# Patient Record
Sex: FEMALE | Race: WHITE | NOT HISPANIC OR LATINO | Employment: UNEMPLOYED | URBAN - METROPOLITAN AREA
[De-identification: names, ages, dates, MRNs, and addresses within clinical notes are randomized per-mention and may not be internally consistent; named-entity substitution may affect disease eponyms.]

---

## 2020-12-15 ENCOUNTER — EVALUATION (OUTPATIENT)
Dept: OCCUPATIONAL THERAPY | Facility: CLINIC | Age: 50
End: 2020-12-15
Payer: COMMERCIAL

## 2020-12-15 DIAGNOSIS — S52.551D CLOSED EXTRAARTICULAR FRACTURE OF DISTAL END OF RIGHT RADIUS WITH ROUTINE HEALING: Primary | ICD-10-CM

## 2020-12-15 PROCEDURE — 97165 OT EVAL LOW COMPLEX 30 MIN: CPT

## 2020-12-15 PROCEDURE — 97110 THERAPEUTIC EXERCISES: CPT

## 2020-12-18 ENCOUNTER — OFFICE VISIT (OUTPATIENT)
Dept: OCCUPATIONAL THERAPY | Facility: CLINIC | Age: 50
End: 2020-12-18
Payer: COMMERCIAL

## 2020-12-18 DIAGNOSIS — S52.551D CLOSED EXTRAARTICULAR FRACTURE OF DISTAL END OF RIGHT RADIUS WITH ROUTINE HEALING: Primary | ICD-10-CM

## 2020-12-18 PROCEDURE — 97110 THERAPEUTIC EXERCISES: CPT

## 2020-12-18 PROCEDURE — 97140 MANUAL THERAPY 1/> REGIONS: CPT

## 2020-12-22 ENCOUNTER — OFFICE VISIT (OUTPATIENT)
Dept: OCCUPATIONAL THERAPY | Facility: CLINIC | Age: 50
End: 2020-12-22
Payer: COMMERCIAL

## 2020-12-22 DIAGNOSIS — S52.551D CLOSED EXTRAARTICULAR FRACTURE OF DISTAL END OF RIGHT RADIUS WITH ROUTINE HEALING: Primary | ICD-10-CM

## 2020-12-22 PROCEDURE — 97140 MANUAL THERAPY 1/> REGIONS: CPT

## 2020-12-22 PROCEDURE — 97110 THERAPEUTIC EXERCISES: CPT

## 2020-12-29 ENCOUNTER — APPOINTMENT (OUTPATIENT)
Dept: OCCUPATIONAL THERAPY | Facility: CLINIC | Age: 50
End: 2020-12-29
Payer: COMMERCIAL

## 2020-12-31 ENCOUNTER — OFFICE VISIT (OUTPATIENT)
Dept: OCCUPATIONAL THERAPY | Facility: CLINIC | Age: 50
End: 2020-12-31
Payer: COMMERCIAL

## 2020-12-31 DIAGNOSIS — S52.551D CLOSED EXTRAARTICULAR FRACTURE OF DISTAL END OF RIGHT RADIUS WITH ROUTINE HEALING: Primary | ICD-10-CM

## 2020-12-31 PROCEDURE — 97140 MANUAL THERAPY 1/> REGIONS: CPT

## 2020-12-31 PROCEDURE — 97110 THERAPEUTIC EXERCISES: CPT

## 2021-01-06 ENCOUNTER — OFFICE VISIT (OUTPATIENT)
Dept: OCCUPATIONAL THERAPY | Facility: CLINIC | Age: 51
End: 2021-01-06
Payer: COMMERCIAL

## 2021-01-06 DIAGNOSIS — S52.551D CLOSED EXTRAARTICULAR FRACTURE OF DISTAL END OF RIGHT RADIUS WITH ROUTINE HEALING: Primary | ICD-10-CM

## 2021-01-06 PROCEDURE — 97110 THERAPEUTIC EXERCISES: CPT

## 2021-01-06 PROCEDURE — 97140 MANUAL THERAPY 1/> REGIONS: CPT

## 2021-01-06 NOTE — PROGRESS NOTES
Daily Note     Today's date: 2021  Patient name: Aditya Caldwell  : 1970  MRN: [id number]  Referring provider: Farhat Segal  Dx:   Encounter Diagnosis     ICD-10-CM    1  Closed extraarticular fracture of distal end of right radius with routine healing  S52 551D                   Subjective: Patient noting pain 1/10  Objective: See treatment diary below          Precautions:  ORIF 2020      Manuals 12/15/2020  2020  2020 1/6/21    Grade 2-3 mobs wrist and forearm  x10  x10 x10    STR  X 5 min  x5min x5                    Ther Ex        AROM tendon glides x15 x15 x20 x20    AROM wrist flexion/ext/RD/UD x15 x15 x20 x20    AROM forearm rotation x15 x15 x20 x20    AROM table top extension x15 x15 x20 x20    Towel scrunch and squeeze  x10 x10 x10    Wrist maze    x10 x10 x10    PREs wrist flex/extension/rd    x20 2 lbs    Sponge     x20 blue    Digit extension band    x20 red                            Ther Activity                        Modalities        MHP 5 min   5" 5 min                Assessment:   Patient tolerated session well  Patient session focused on using HEP, modalities, and manual techniques to improve ability to complete ADLs with ease  Added strengthening this date with no increased pain noted  Patient progressing well towards goals at this time  Patient benefiting from skilled OT to reduce dependence with ADLs  Plan:   Focus on AROM to improve ability to complete ADLs with ease

## 2021-01-07 ENCOUNTER — OFFICE VISIT (OUTPATIENT)
Dept: OCCUPATIONAL THERAPY | Facility: CLINIC | Age: 51
End: 2021-01-07
Payer: COMMERCIAL

## 2021-01-07 DIAGNOSIS — S52.551D CLOSED EXTRAARTICULAR FRACTURE OF DISTAL END OF RIGHT RADIUS WITH ROUTINE HEALING: Primary | ICD-10-CM

## 2021-01-07 PROCEDURE — 97110 THERAPEUTIC EXERCISES: CPT

## 2021-01-07 NOTE — PROGRESS NOTES
Daily Note     Today's date: 2021  Patient name: Vic Moreno  : 1970  MRN: [id number]  Referring provider: Sia Peace  Dx:   Encounter Diagnosis     ICD-10-CM    1  Closed extraarticular fracture of distal end of right radius with routine healing  S52 551D                   Subjective: Patient noting pain 1/10  Objective: See treatment diary below          Precautions:  ORIF 2020      Manuals 12/15/2020  2020  2020 1/6/21 1/7/21   Grade 2-3 mobs wrist and forearm  x10  x10 x10 x10   STR  X 5 min  x5min x5 x5 min                   Ther Ex        AROM tendon glides x15 x15 x20 x20    AROM wrist flexion/ext/RD/UD x15 x15 x20 x20 x20   AROM forearm rotation x15 x15 x20 x20 x20   AROM table top extension x15 x15 x20 x20 x20   Towel scrunch and squeeze  x10 x10 x10 x10   Wrist maze    x10 x10 x10 x10   PREs wrist flex/extension/rd    x20 2 lbs x20 2 lbs   gripper    x20 blue Level 2 yellow x20   Digit extension band    x20 red x20 red   putty     Pinch                     Ther Activity                        Modalities        MHP 5 min   5" 5 min                Assessment:   Patient tolerated session well  Patient session focused on using HEP, modalities, and manual techniques to improve ability to complete ADLs with ease  Added strengthening this date with no increased pain noted  Patient progressing well towards goals at this time  Patient benefiting from skilled OT to reduce dependence with ADLs  Plan:   Focus on AROM to improve ability to complete ADLs with ease

## 2021-01-08 ENCOUNTER — APPOINTMENT (OUTPATIENT)
Dept: OCCUPATIONAL THERAPY | Facility: CLINIC | Age: 51
End: 2021-01-08
Payer: COMMERCIAL

## 2021-01-12 ENCOUNTER — EVALUATION (OUTPATIENT)
Dept: PHYSICAL THERAPY | Facility: CLINIC | Age: 51
End: 2021-01-12
Payer: COMMERCIAL

## 2021-01-12 ENCOUNTER — OFFICE VISIT (OUTPATIENT)
Dept: OCCUPATIONAL THERAPY | Facility: CLINIC | Age: 51
End: 2021-01-12
Payer: COMMERCIAL

## 2021-01-12 DIAGNOSIS — M76.821 TIBIALIS POSTERIOR TENDONITIS, RIGHT: Primary | ICD-10-CM

## 2021-01-12 DIAGNOSIS — S52.552D CLOSED EXTRAARTICULAR FRACTURE OF DISTAL END OF LEFT RADIUS WITH ROUTINE HEALING: Primary | ICD-10-CM

## 2021-01-12 DIAGNOSIS — M79.671 RIGHT FOOT PAIN: ICD-10-CM

## 2021-01-12 PROBLEM — G35 MULTIPLE SCLEROSIS (HCC): Status: ACTIVE | Noted: 2021-01-12

## 2021-01-12 PROCEDURE — 97161 PT EVAL LOW COMPLEX 20 MIN: CPT

## 2021-01-12 PROCEDURE — 97110 THERAPEUTIC EXERCISES: CPT

## 2021-01-12 PROCEDURE — 97140 MANUAL THERAPY 1/> REGIONS: CPT

## 2021-01-12 NOTE — LETTER
2021    [address]     Patient: Gerilyn Bloch   YOB: 1970   Date of Visit: 2021     Encounter Diagnosis     ICD-10-CM    1  Tibialis posterior tendonitis, right  M76 821    2  Right foot pain  M79 671        Dear Dr Vincent Fay: Thank you for your recent referral of Gerilyn Bloch  Please review the attached evaluation summary from Roxi's recent visit  Please verify that you agree with the plan of care by signing the attached order  If you have any questions or concerns, please do not hesitate to call  I sincerely appreciate the opportunity to share in the care of one of your patients and hope to have another opportunity to work with you in the near future  Sincerely,    Inga Juan, PT      Referring Provider:      I certify that I have read the below Plan of Care and certify the need for these services furnished under this plan of treatment while under my care  Uche Patel  [address] [place] [address]  Via Fax: [phone]          PT Evaluation     Today's date: 2021  Patient name: Gerilyn Bloch  : 1970  MRN: [id number]  Referring provider: No ref  provider found  Dx:   Encounter Diagnosis     ICD-10-CM    1  Tibialis posterior tendonitis, right  M76 821    2  Right foot pain  M79 671        Start Time: 1400  Stop Time: 1445  Total time in clinic (min): 45 minutes    Assessment  Assessment details: Gerilyn Bloch is a 48y o  year old female reports to physical therapy with symptoms consistent with referring diagnosis of: Tibialis posterior tendonitis, right  (primary encounter diagnosis  Patient demonstrates limitations in R ankle and foot ROM, strength, and functional mobility  Patient is limited in the following functional activities: standing and walking greater than 15 minutes, ascending/descending stairs, and sleeping through the night due to pain       FOTO score is 32% with a 52% prediction in function  Patient requires skilled physical therapy to restore prior level of function, address functional limitations, and progress towards independence with home exercise program  Patient was educated on HEP as noted on flow sheet, nature of PTTD, and importance of performing ice throughout the course of the day to eliminate swelling  Patient verbalized and demonstrated understanding of HEP and plan of care  Symptom irritability: low  Goals  STGs to be achieved in 4 weeks  -STG 1: Patient will be independent with HEP    -STG 2: Patient will have 0/10 R foot pain at rest    -STG 3: Patient will increase R great toe ROM to within normal limits  -STG 4: Patient will report 40% functional improvement  -STG 5: Patient will demonstrate 1/2 grade MMT improvement in R ankle  -STG 6: Patient will be able to stand for greater than 15 minutes with no pain  LTGs to be achieved in 8 weeks  -LTG 1: Patient will demonstrate independence with maintenance program    -LTG 2: Patient will perform all functional activities with less than 2/10 R ankle/foot pain  -LTG 3: Patient will improve FOTO score by 10 points  -LTG 4: Patient will report 80% functional improvement  -LTG 5: Patient will be able to stand for greater than 15 minutes with no pain  -LTG 6: Patient will demonstrate 1 grade MMT improvement in R ankle         Plan  Patient would benefit from: PT eval and skilled physical therapy  Planned modality interventions: manual electrical stimulation, microcurrent electrical stimulation, TENS, thermotherapy: hydrocollator packs, cryotherapy, electrical stimulation/Russian stimulation, traction and ultrasound  Planned therapy interventions: joint mobilization, manual therapy, massage, balance, neuromuscular re-education, orthotic management and training, patient education, strengthening, stretching, therapeutic activities, therapeutic exercise, therapeutic training, flexibility, functional ROM exercises, gait training, graded activity, graded exercise, graded motor and home exercise program  Frequency: 3x week  Duration in weeks: 8  Treatment plan discussed with: patient        Subjective Evaluation    History of Present Illness  Mechanism of injury: Patient reports having increased R ankle and foot pain for the last several months  She fell into a marsh when walking around the park with her daughter and grandchildren  She reports to skilled PT intervention with R PTTD  She denies numbness or tingling on R feet  Patient reports increased swelling throughout the course of her day  Functionally, she reports difficulties with ambulation, ascending/descending stairs, and standing  She was prescribed a brace at the podiatrist, which helped dissipate her pain  She now has a brace that laces to help stabilize her R ankle  At times, she is using an Ace bandage due to difficulties donning her brace  She had an x-ray performed on her R ankle, with clear results  Patient reports having arch supports for her sneakers  She was last prescribed 20 years ago, and has not received a pair since  Patient has a history of MS  Pain  Current pain ratin  At best pain ratin  At worst pain ratin  Relieving factors: medications, rest and ice  Aggravating factors: standing, walking and stair climbing  Progression: no change    Social Support  Steps to enter house: yes  12  Stairs in house: yes   4  Lives in: multiple-level home  Lives with: significant other    Employment status: not working    Diagnostic Tests  X-ray: normal  Treatments  Current treatment: medication  Patient Goals  Patient goals for therapy: decreased pain          Objective     Static Posture     Ankle/Foot   Ankle/Foot (Left): Pes planus  Ankle/Foot (Right): Pes planus       Neurological Testing     Sensation     Ankle/Foot   Left Ankle/Foot   Intact: light touch    Right Ankle/Foot   Intact: light touch     Active Range of Motion Left Ankle/Foot   Normal active range of motion    Right Ankle/Foot   Dorsiflexion (ke): 5 degrees   Dorsiflexion (kf): 5 degrees   Plantar flexion: WFL  Inversion: WFL  Eversion: WFL  Great toe extension: 10 degrees     Passive Range of Motion   Left Ankle/Foot  Normal passive range of motion    Right Ankle/Foot    Dorsiflexion (ke): 5 degrees   Dorsiflexion (kf): 5 degrees    Plantar flexion: WFL  Inversion: WFL  Eversion: WFL  Great toe extension: 10 degrees     Strength/Myotome Testing     Left Ankle/Foot   Normal strength    Right Ankle/Foot   Dorsiflexion: 4  Plantar flexion: 4  Inversion: 3+  Eversion: 3+  Great toe flexion: 5  Great toe extension: 4    Tests     Additional Tests Details  No special tests performed, as ortho note reviewed prior to initial evaluation  Ambulation     Quality of Movement During Gait     Ankle    Ankle (Left): Positive pronated  Ankle (Right): Positive pronated  Foot Alignment    Foot Alignment (Left): Positive flat foot  Foot Alignment (Right): Positive flat foot         Flowsheet Rows      Most Recent Value   PT/OT G-Codes   Current Score  32   Projected Score  52             Precautions: Standard, multiple sclerosis, L distal radius fracture, R PTTD      Manuals 1/12            STM R plantar fascia              PRE R ankle                                       Neuro Re-Ed             Towel scrunch x20            Intrinsic strength x20            Balance                                                                 Ther Ex             NuStep warmup             Gastroc stretch 3x10s            Ankle 4 way x10 YTB            SLR flex, abd             LAQ             Wobble board                                       Ther Activity                                       Gait Training                                       Modalities             Ice post

## 2021-01-12 NOTE — PROGRESS NOTES
Daily Note     Today's date: 2021  Patient name: Shanae Galdamez  : 1970  MRN: [id number]  Referring provider: Sekou Valentine  Dx:   Encounter Diagnosis     ICD-10-CM    1  Closed extraarticular fracture of distal end of left radius with routine healing  S52 552D                   Subjective: Patient noting pain 1/10  Objective: See treatment diary below          Precautions:  ORIF 2020      Manuals 12/15/2020  2020  2020 1/6/21 1/7/21   Grade 2-3 mobs wrist and forearm  x10  x10 x10 x10   STR  X 5 min  x5min x5 x5 min                   Ther Ex        AROM tendon glides x15 x15 x20 x20    AROM wrist flexion/ext/RD/UD x15 x15 x20 x20 x20   AROM forearm rotation x15 x15 x20 x20 x20   AROM table top extension x15 x15 x20 x20 x20   Towel scrunch and squeeze  x10 x10 x10 x10   Wrist maze    x10 x10 x10 x10   PREs wrist flex/extension/rd   x20 2 lbs x20 2 lbs x20 2 lbs   gripper   x20 level 3 yellow x20 blue Level 2 yellow x20   Digit extension band   x20 red x20 red x20 red   Flex bar all planes   x20 red   Pinch                     Ther Activity                        Modalities        MHP 5 min   5" 5 min                Assessment:   Patient tolerated session well  Patient session focused on using HEP, modalities, manual techniques, and TE to improve ability to complete ADLs with ease  Added strengthening this date with no increased pain noted  Patient progressing well towards goals at this time  Patient benefiting from skilled OT to reduce dependence with ADLs  Plan:   Focus on AROM to improve ability to complete ADLs with ease

## 2021-01-12 NOTE — PROGRESS NOTES
PT Evaluation     Today's date: 2021  Patient name: Isis Manzo  : 1970  MRN: [id number]  Referring provider: Albino Lockett  Dx:   Encounter Diagnosis     ICD-10-CM    1  Tibialis posterior tendonitis, right  M76 821    2  Right foot pain  M79 671        Start Time: 1400  Stop Time: 1445  Total time in clinic (min): 45 minutes    Assessment  Assessment details: Isis Manzo is a 48y o  year old female reports to physical therapy with symptoms consistent with referring diagnosis of: Tibialis posterior tendonitis, right  (primary encounter diagnosis  Patient demonstrates limitations in R ankle and foot ROM, strength, and functional mobility  Patient is limited in the following functional activities: standing and walking greater than 15 minutes, ascending/descending stairs, and sleeping through the night due to pain  FOTO score is 32% with a 52% prediction in function  Patient requires skilled physical therapy to restore prior level of function, address functional limitations, and progress towards independence with home exercise program  Patient was educated on HEP as noted on flow sheet, nature of PTTD, and importance of performing ice throughout the course of the day to eliminate swelling  Patient verbalized and demonstrated understanding of HEP and plan of care  Symptom irritability: low  Goals  STGs to be achieved in 4 weeks  -STG 1: Patient will be independent with HEP    -STG 2: Patient will have 0/10 R foot pain at rest    -STG 3: Patient will increase R great toe ROM to within normal limits  -STG 4: Patient will report 40% functional improvement  -STG 5: Patient will demonstrate 1/2 grade MMT improvement in R ankle  -STG 6: Patient will be able to stand for greater than 15 minutes with no pain  LTGs to be achieved in 8 weeks     -LTG 1: Patient will demonstrate independence with maintenance program    -LTG 2: Patient will perform all functional activities with less than 2/10 R ankle/foot pain  -LTG 3: Patient will improve FOTO score by 10 points  -LTG 4: Patient will report 80% functional improvement  -LTG 5: Patient will be able to stand for greater than 15 minutes with no pain  -LTG 6: Patient will demonstrate 1 grade MMT improvement in R ankle  Plan  Patient would benefit from: PT eval and skilled physical therapy  Planned modality interventions: manual electrical stimulation, microcurrent electrical stimulation, TENS, thermotherapy: hydrocollator packs, cryotherapy, electrical stimulation/Russian stimulation, traction and ultrasound  Planned therapy interventions: joint mobilization, manual therapy, massage, balance, neuromuscular re-education, orthotic management and training, patient education, strengthening, stretching, therapeutic activities, therapeutic exercise, therapeutic training, flexibility, functional ROM exercises, gait training, graded activity, graded exercise, graded motor and home exercise program  Frequency: 3x week  Duration in weeks: 8  Treatment plan discussed with: patient        Subjective Evaluation    History of Present Illness  Mechanism of injury: Patient reports having increased R ankle and foot pain for the last several months  She fell into a marsh when walking around the park with her daughter and grandchildren  She reports to skilled PT intervention with R PTTD  She denies numbness or tingling on R feet  Patient reports increased swelling throughout the course of her day  Functionally, she reports difficulties with ambulation, ascending/descending stairs, and standing  She was prescribed a brace at the podiatrist, which helped dissipate her pain  She now has a brace that laces to help stabilize her R ankle  At times, she is using an Ace bandage due to difficulties donning her brace  She had an x-ray performed on her R ankle, with clear results  Patient reports having arch supports for her sneakers   She was last prescribed 20 years ago, and has not received a pair since  Patient has a history of MS  Pain  Current pain ratin  At best pain ratin  At worst pain ratin  Relieving factors: medications, rest and ice  Aggravating factors: standing, walking and stair climbing  Progression: no change    Social Support  Steps to enter house: yes  12  Stairs in house: yes   4  Lives in: multiple-level home  Lives with: significant other    Employment status: not working    Diagnostic Tests  X-ray: normal  Treatments  Current treatment: medication  Patient Goals  Patient goals for therapy: decreased pain          Objective     Static Posture     Ankle/Foot   Ankle/Foot (Left): Pes planus  Ankle/Foot (Right): Pes planus  Neurological Testing     Sensation     Ankle/Foot   Left Ankle/Foot   Intact: light touch    Right Ankle/Foot   Intact: light touch     Active Range of Motion   Left Ankle/Foot   Normal active range of motion    Right Ankle/Foot   Dorsiflexion (ke): 5 degrees   Dorsiflexion (kf): 5 degrees   Plantar flexion: WFL  Inversion: WFL  Eversion: WFL  Great toe extension: 10 degrees     Passive Range of Motion   Left Ankle/Foot  Normal passive range of motion    Right Ankle/Foot    Dorsiflexion (ke): 5 degrees   Dorsiflexion (kf): 5 degrees    Plantar flexion: WFL  Inversion: WFL  Eversion: WFL  Great toe extension: 10 degrees     Strength/Myotome Testing     Left Ankle/Foot   Normal strength    Right Ankle/Foot   Dorsiflexion: 4  Plantar flexion: 4  Inversion: 3+  Eversion: 3+  Great toe flexion: 5  Great toe extension: 4    Tests     Additional Tests Details  No special tests performed, as ortho note reviewed prior to initial evaluation  Ambulation     Quality of Movement During Gait     Ankle    Ankle (Left): Positive pronated  Ankle (Right): Positive pronated  Foot Alignment    Foot Alignment (Left): Positive flat foot  Foot Alignment (Right): Positive flat foot         Flowsheet Rows      Most Recent Value PT/OT G-Codes   Current Score  32   Projected Score  52             Precautions: Standard, multiple sclerosis, L distal radius fracture, R PTTD      Manuals 1/12            STM R plantar fascia              PRE R ankle                                       Neuro Re-Ed             Towel scrunch x20            Intrinsic strength x20            Balance                                                                 Ther Ex             NuStep warmup             Gastroc stretch 3x10s            Ankle 4 way x10 YTB            SLR flex, abd             LAQ             Wobble board                                       Ther Activity                                       Gait Training                                       Modalities             Ice post

## 2021-01-12 NOTE — LETTER
2021    No Recipients    Patient: Tran Mack   YOB: 1970   Date of Visit: 2021     Encounter Diagnosis     ICD-10-CM    1  Tibialis posterior tendonitis, right  M76 821    2  Right foot pain  M79 671        Dear Dr Etta Eagle Recipients: Thank you for your recent referral of Tran Mack  Please review the attached evaluation summary from Roxi's recent visit  Please verify that you agree with the plan of care by signing the attached order  If you have any questions or concerns, please do not hesitate to call  I sincerely appreciate the opportunity to share in the care of one of your patients and hope to have another opportunity to work with you in the near future  Sincerely,    Matilda Griffin, PT      Referring Provider:      I certify that I have read the below Plan of Care and certify the need for these services furnished under this plan of treatment while under my care  No Recipients          PT Evaluation     Today's date: 2021  Patient name: Tran Mack  : 1970  MRN: [id number]  Referring provider: No ref  provider found  Dx:   Encounter Diagnosis     ICD-10-CM    1  Tibialis posterior tendonitis, right  M76 821    2  Right foot pain  M79 671        Start Time: 1400  Stop Time: 1445  Total time in clinic (min): 45 minutes    Assessment  Assessment details: Tran Mack is a 48y o  year old female reports to physical therapy with symptoms consistent with referring diagnosis of: Tibialis posterior tendonitis, right  (primary encounter diagnosis  Patient demonstrates limitations in R ankle and foot ROM, strength, and functional mobility  Patient is limited in the following functional activities: standing and walking greater than 15 minutes, ascending/descending stairs, and sleeping through the night due to pain  FOTO score is 32% with a 52% prediction in function        Patient requires skilled physical therapy to restore prior level of function, address functional limitations, and progress towards independence with home exercise program  Patient was educated on HEP as noted on flow sheet, nature of PTTD, and importance of performing ice throughout the course of the day to eliminate swelling  Patient verbalized and demonstrated understanding of HEP and plan of care  Symptom irritability: low  Goals  STGs to be achieved in 4 weeks  -STG 1: Patient will be independent with HEP    -STG 2: Patient will have 0/10 R foot pain at rest    -STG 3: Patient will increase R great toe ROM to within normal limits  -STG 4: Patient will report 40% functional improvement  -STG 5: Patient will demonstrate 1/2 grade MMT improvement in R ankle  -STG 6: Patient will be able to stand for greater than 15 minutes with no pain  LTGs to be achieved in 8 weeks  -LTG 1: Patient will demonstrate independence with maintenance program    -LTG 2: Patient will perform all functional activities with less than 2/10 R ankle/foot pain  -LTG 3: Patient will improve FOTO score by 10 points  -LTG 4: Patient will report 80% functional improvement  -LTG 5: Patient will be able to stand for greater than 15 minutes with no pain  -LTG 6: Patient will demonstrate 1 grade MMT improvement in R ankle         Plan  Patient would benefit from: PT eval and skilled physical therapy  Planned modality interventions: manual electrical stimulation, microcurrent electrical stimulation, TENS, thermotherapy: hydrocollator packs, cryotherapy, electrical stimulation/Russian stimulation, traction and ultrasound  Planned therapy interventions: joint mobilization, manual therapy, massage, balance, neuromuscular re-education, orthotic management and training, patient education, strengthening, stretching, therapeutic activities, therapeutic exercise, therapeutic training, flexibility, functional ROM exercises, gait training, graded activity, graded exercise, graded motor and home exercise program  Frequency: 3x week  Duration in weeks: 8  Treatment plan discussed with: patient        Subjective Evaluation    History of Present Illness  Mechanism of injury: Patient reports having increased R ankle and foot pain for the last several months  She fell into a marsh when walking around the park with her daughter and grandchildren  She reports to skilled PT intervention with R PTTD  She denies numbness or tingling on R feet  Patient reports increased swelling throughout the course of her day  Functionally, she reports difficulties with ambulation, ascending/descending stairs, and standing  She was prescribed a brace at the podiatrist, which helped dissipate her pain  She now has a brace that laces to help stabilize her R ankle  At times, she is using an Ace bandage due to difficulties donning her brace  She had an x-ray performed on her R ankle, with clear results  Patient reports having arch supports for her sneakers  She was last prescribed 20 years ago, and has not received a pair since  Patient has a history of MS  Pain  Current pain ratin  At best pain ratin  At worst pain ratin  Relieving factors: medications, rest and ice  Aggravating factors: standing, walking and stair climbing  Progression: no change    Social Support  Steps to enter house: yes  12  Stairs in house: yes   4  Lives in: multiple-level home  Lives with: significant other    Employment status: not working    Diagnostic Tests  X-ray: normal  Treatments  Current treatment: medication  Patient Goals  Patient goals for therapy: decreased pain          Objective     Static Posture     Ankle/Foot   Ankle/Foot (Left): Pes planus  Ankle/Foot (Right): Pes planus       Neurological Testing     Sensation     Ankle/Foot   Left Ankle/Foot   Intact: light touch    Right Ankle/Foot   Intact: light touch     Active Range of Motion   Left Ankle/Foot   Normal active range of motion    Right Ankle/Foot Dorsiflexion (ke): 5 degrees   Dorsiflexion (kf): 5 degrees   Plantar flexion: WFL  Inversion: WFL  Eversion: WFL  Great toe extension: 10 degrees     Passive Range of Motion   Left Ankle/Foot  Normal passive range of motion    Right Ankle/Foot    Dorsiflexion (ke): 5 degrees   Dorsiflexion (kf): 5 degrees    Plantar flexion: WFL  Inversion: WFL  Eversion: WFL  Great toe extension: 10 degrees     Strength/Myotome Testing     Left Ankle/Foot   Normal strength    Right Ankle/Foot   Dorsiflexion: 4  Plantar flexion: 4  Inversion: 3+  Eversion: 3+  Great toe flexion: 5  Great toe extension: 4    Tests     Additional Tests Details  No special tests performed, as ortho note reviewed prior to initial evaluation  Ambulation     Quality of Movement During Gait     Ankle    Ankle (Left): Positive pronated  Ankle (Right): Positive pronated  Foot Alignment    Foot Alignment (Left): Positive flat foot  Foot Alignment (Right): Positive flat foot         Flowsheet Rows      Most Recent Value   PT/OT G-Codes   Current Score  32   Projected Score  52             Precautions: Standard, multiple sclerosis, L distal radius fracture, R PTTD      Manuals 1/12            STM R plantar fascia              PRE R ankle                                       Neuro Re-Ed             Towel scrunch x20            Intrinsic strength x20            Balance                                                                 Ther Ex             NuStep warmup             Gastroc stretch 3x10s            Ankle 4 way x10 YTB            SLR flex, abd             LAQ             Wobble board                                       Ther Activity                                       Gait Training                                       Modalities             Ice post

## 2021-01-12 NOTE — LETTER
February 3, 2021    [address]     Patient: Sherine Saez   YOB: 1970   Date of Visit: 2021     Encounter Diagnosis     ICD-10-CM    1  Tibialis posterior tendonitis, right  M76 821    2  Right foot pain  M79 671        Dear Dr Rozena Mohs: Thank you for your recent referral of Sherine Saez  Please review the attached evaluation summary from Roxi's recent visit  Please verify that you agree with the plan of care by signing the attached order  If you have any questions or concerns, please do not hesitate to call  I sincerely appreciate the opportunity to share in the care of one of your patients and hope to have another opportunity to work with you in the near future  Sincerely,    Julianna Necessary, PT      Referring Provider:      I certify that I have read the below Plan of Care and certify the need for these services furnished under this plan of treatment while under my care  Uche Patel  [address] [address]  Via Fax: [phone]          PT Evaluation     Today's date: 2021  Patient name: Sherine Saez  : 1970  MRN: [id number]  Referring provider: Meagan Pritchard  Dx:   Encounter Diagnosis     ICD-10-CM    1  Tibialis posterior tendonitis, right  M76 821    2  Right foot pain  M79 671        Start Time: 1400  Stop Time: 1445  Total time in clinic (min): 45 minutes    Assessment  Assessment details: Sherine Saez is a 48y o  year old female reports to physical therapy with symptoms consistent with referring diagnosis of: Tibialis posterior tendonitis, right  (primary encounter diagnosis  Patient demonstrates limitations in R ankle and foot ROM, strength, and functional mobility  Patient is limited in the following functional activities: standing and walking greater than 15 minutes, ascending/descending stairs, and sleeping through the night due to pain       FOTO score is 32% with a 52% prediction in function  Patient requires skilled physical therapy to restore prior level of function, address functional limitations, and progress towards independence with home exercise program  Patient was educated on HEP as noted on flow sheet, nature of PTTD, and importance of performing ice throughout the course of the day to eliminate swelling  Patient verbalized and demonstrated understanding of HEP and plan of care  Symptom irritability: low  Goals  STGs to be achieved in 4 weeks  -STG 1: Patient will be independent with HEP    -STG 2: Patient will have 0/10 R foot pain at rest    -STG 3: Patient will increase R great toe ROM to within normal limits  -STG 4: Patient will report 40% functional improvement  -STG 5: Patient will demonstrate 1/2 grade MMT improvement in R ankle  -STG 6: Patient will be able to stand for greater than 15 minutes with no pain  LTGs to be achieved in 8 weeks  -LTG 1: Patient will demonstrate independence with maintenance program    -LTG 2: Patient will perform all functional activities with less than 2/10 R ankle/foot pain  -LTG 3: Patient will improve FOTO score by 10 points  -LTG 4: Patient will report 80% functional improvement  -LTG 5: Patient will be able to stand for greater than 15 minutes with no pain  -LTG 6: Patient will demonstrate 1 grade MMT improvement in R ankle         Plan  Patient would benefit from: PT eval and skilled physical therapy  Planned modality interventions: manual electrical stimulation, microcurrent electrical stimulation, TENS, thermotherapy: hydrocollator packs, cryotherapy, electrical stimulation/Russian stimulation, traction and ultrasound  Planned therapy interventions: joint mobilization, manual therapy, massage, balance, neuromuscular re-education, orthotic management and training, patient education, strengthening, stretching, therapeutic activities, therapeutic exercise, therapeutic training, flexibility, functional ROM exercises, gait training, graded activity, graded exercise, graded motor and home exercise program  Frequency: 3x week  Duration in weeks: 8  Treatment plan discussed with: patient        Subjective Evaluation    History of Present Illness  Mechanism of injury: Patient reports having increased R ankle and foot pain for the last several months  She fell into a marsh when walking around the park with her daughter and grandchildren  She reports to skilled PT intervention with R PTTD  She denies numbness or tingling on R feet  Patient reports increased swelling throughout the course of her day  Functionally, she reports difficulties with ambulation, ascending/descending stairs, and standing  She was prescribed a brace at the podiatrist, which helped dissipate her pain  She now has a brace that laces to help stabilize her R ankle  At times, she is using an Ace bandage due to difficulties donning her brace  She had an x-ray performed on her R ankle, with clear results  Patient reports having arch supports for her sneakers  She was last prescribed 20 years ago, and has not received a pair since  Patient has a history of MS  Pain  Current pain ratin  At best pain ratin  At worst pain ratin  Relieving factors: medications, rest and ice  Aggravating factors: standing, walking and stair climbing  Progression: no change    Social Support  Steps to enter house: yes  12  Stairs in house: yes   4  Lives in: multiple-level home  Lives with: significant other    Employment status: not working    Diagnostic Tests  X-ray: normal  Treatments  Current treatment: medication  Patient Goals  Patient goals for therapy: decreased pain          Objective     Static Posture     Ankle/Foot   Ankle/Foot (Left): Pes planus  Ankle/Foot (Right): Pes planus       Neurological Testing     Sensation     Ankle/Foot   Left Ankle/Foot   Intact: light touch    Right Ankle/Foot   Intact: light touch     Active Range of Motion Left Ankle/Foot   Normal active range of motion    Right Ankle/Foot   Dorsiflexion (ke): 5 degrees   Dorsiflexion (kf): 5 degrees   Plantar flexion: WFL  Inversion: WFL  Eversion: WFL  Great toe extension: 10 degrees     Passive Range of Motion   Left Ankle/Foot  Normal passive range of motion    Right Ankle/Foot    Dorsiflexion (ke): 5 degrees   Dorsiflexion (kf): 5 degrees    Plantar flexion: WFL  Inversion: WFL  Eversion: WFL  Great toe extension: 10 degrees     Strength/Myotome Testing     Left Ankle/Foot   Normal strength    Right Ankle/Foot   Dorsiflexion: 4  Plantar flexion: 4  Inversion: 3+  Eversion: 3+  Great toe flexion: 5  Great toe extension: 4    Tests     Additional Tests Details  No special tests performed, as ortho note reviewed prior to initial evaluation  Ambulation     Quality of Movement During Gait     Ankle    Ankle (Left): Positive pronated  Ankle (Right): Positive pronated  Foot Alignment    Foot Alignment (Left): Positive flat foot  Foot Alignment (Right): Positive flat foot         Flowsheet Rows      Most Recent Value   PT/OT G-Codes   Current Score  32   Projected Score  52             Precautions: Standard, multiple sclerosis, L distal radius fracture, R PTTD      Manuals 1/12            STM R plantar fascia              PRE R ankle                                       Neuro Re-Ed             Towel scrunch x20            Intrinsic strength x20            Balance                                                                 Ther Ex             NuStep warmup             Gastroc stretch 3x10s            Ankle 4 way x10 YTB            SLR flex, abd             LAQ             Wobble board                                       Ther Activity                                       Gait Training                                       Modalities             Ice post

## 2021-01-14 ENCOUNTER — TRANSCRIBE ORDERS (OUTPATIENT)
Dept: PHYSICAL THERAPY | Facility: CLINIC | Age: 51
End: 2021-01-14

## 2021-01-14 ENCOUNTER — OFFICE VISIT (OUTPATIENT)
Dept: PHYSICAL THERAPY | Facility: CLINIC | Age: 51
End: 2021-01-14
Payer: COMMERCIAL

## 2021-01-14 ENCOUNTER — OFFICE VISIT (OUTPATIENT)
Dept: PODIATRY | Facility: CLINIC | Age: 51
End: 2021-01-14
Payer: COMMERCIAL

## 2021-01-14 ENCOUNTER — OFFICE VISIT (OUTPATIENT)
Dept: OCCUPATIONAL THERAPY | Facility: CLINIC | Age: 51
End: 2021-01-14
Payer: COMMERCIAL

## 2021-01-14 VITALS
RESPIRATION RATE: 18 BRPM | DIASTOLIC BLOOD PRESSURE: 68 MMHG | SYSTOLIC BLOOD PRESSURE: 156 MMHG | BODY MASS INDEX: 33.18 KG/M2 | HEART RATE: 75 BPM | HEIGHT: 69 IN | WEIGHT: 224 LBS

## 2021-01-14 DIAGNOSIS — M79.671 RIGHT FOOT PAIN: ICD-10-CM

## 2021-01-14 DIAGNOSIS — S52.552D CLOSED EXTRAARTICULAR FRACTURE OF DISTAL END OF LEFT RADIUS WITH ROUTINE HEALING: Primary | ICD-10-CM

## 2021-01-14 DIAGNOSIS — S52.551D CLOSED EXTRAARTICULAR FRACTURE OF DISTAL END OF RIGHT RADIUS WITH ROUTINE HEALING: ICD-10-CM

## 2021-01-14 DIAGNOSIS — M76.821 POSTERIOR TIBIAL TENDINITIS OF RIGHT LEG: Primary | ICD-10-CM

## 2021-01-14 DIAGNOSIS — M76.821 TIBIALIS POSTERIOR TENDONITIS, RIGHT: Primary | ICD-10-CM

## 2021-01-14 DIAGNOSIS — R26.2 DIFFICULTY WALKING: ICD-10-CM

## 2021-01-14 DIAGNOSIS — M76.821 TIBIALIS POSTERIOR TENDINITIS, RIGHT: Primary | ICD-10-CM

## 2021-01-14 DIAGNOSIS — R60.0 LOCALIZED EDEMA: ICD-10-CM

## 2021-01-14 PROCEDURE — 97140 MANUAL THERAPY 1/> REGIONS: CPT

## 2021-01-14 PROCEDURE — 99203 OFFICE O/P NEW LOW 30 MIN: CPT | Performed by: PODIATRIST

## 2021-01-14 PROCEDURE — 97110 THERAPEUTIC EXERCISES: CPT

## 2021-01-14 RX ORDER — GABAPENTIN 100 MG/1
100 CAPSULE ORAL 2 TIMES DAILY
COMMUNITY
End: 2021-12-21 | Stop reason: SDUPTHER

## 2021-01-14 RX ORDER — MELOXICAM 15 MG/1
15 TABLET ORAL DAILY
Qty: 30 TABLET | Refills: 0 | Status: SHIPPED | OUTPATIENT
Start: 2021-01-14 | End: 2021-05-26 | Stop reason: SDUPTHER

## 2021-01-14 RX ORDER — FEXOFENADINE HYDROCHLORIDE 60 MG/1
60 TABLET, FILM COATED ORAL
COMMUNITY

## 2021-01-14 RX ORDER — LEVOTHYROXINE SODIUM 0.05 MG/1
50 TABLET ORAL DAILY
COMMUNITY

## 2021-01-14 NOTE — PROGRESS NOTES
Daily Note     Today's date: 2021  Patient name: Magui Garcia  : 1970  MRN: [id number]  Referring provider: No ref  provider found  Dx:   Encounter Diagnosis     ICD-10-CM    1  Tibialis posterior tendonitis, right  M76 821    2  Right foot pain  M79 671        Start Time: 1400  Stop Time: 1445  Total time in clinic (min): 45 minutes    Subjective: Patient denies pain in R foot today  She walked around after PT last visit, and her boyfriend noted that she is "walking much better " She enjoyed the taping on the medial aspect of her R ankle  She noticed increased R lateral ankle pain after her medial pain dissipated  Objective: See treatment diary below      Assessment: Soreness noted on plantar aspect of foot post tx  No pain with KT tape of R medial foot  Taping for arch support allows patient to stand with minimal difficulties  Tolerated treatment well  Patient would benefit from continued PT to decrease pain in R foot, improve strength of R LE, and maximize function for independence in community  Plan: Continue per plan of care           Precautions: DR CALLAHAN 2020, MS        Manuals       STM R plantar fascia   AR      PRE R ankle                        Neuro Re-Ed        Towel scrunch x20 3 x 30s      Intrinsic strength x20 3s x 15      Balance                Ther Ex        NuStep warmup  5'      Gastroc stretch 3x10s       Ankle 4 way x10 YTB 20 YTB      SLR flex, abd  NV      LAQ  20      Wobble board  30      Pro stretch  5 x 10s      Soleus stretch  5 x 10s foam      Standing great toe ext  10      Seated great toe ext  20              Gait Training                        Modalities        Ice post  Def

## 2021-01-14 NOTE — PROGRESS NOTES
Daily Note     Today's date: 2021  Patient name: Marya Raymond  : 1970  MRN: [id number]  Referring provider: Darcy Morrell  Dx:   Encounter Diagnosis     ICD-10-CM    1  Closed extraarticular fracture of distal end of left radius with routine healing  S52 552D        Start Time: 4885  Stop Time: 1521  Total time in clinic (min): 36 minutes    Subjective: Patient reported that she has a pain of 1/10 in her wrist today  "Sometimes I'll be sitting there and my fingers will just start aching "      Objective: See treatment diary below          Precautions:  ORIF 2020      Manuals 12/15/2020  2020  2020 2021 1/7/21   Grade 2-3 mobs wrist and forearm  x10  x10 x10 x10   STR  X 5 min  x5min x5 min x5 min                   Ther Ex        AROM tendon glides x15 x15 x20     A/PROM wrist flexion/ext/RD/UD x15 x15 x20 x20 x20   A/PROM forearm rotation x15 x15 x20 x20 x20   AROM table top extension x15 x15 x20 x20 x20   Towel scrunch and squeeze  x10 x10 x20 x10   Wrist maze    x10 x10 x10 x10   PREs wrist flex/ext/RD   x20 2 lbs x20 2 lbs x20 2 lbs   gripper   x20 level 3 yellow x20 level 3 yellow Level 2 yellow x20   Digit extension band   x20 red x20 red x20 red   Flex bar all planes   x20 red  x20 red Pinch                     Ther Activity                        Modalities        MHP 5 min   5" 6 min                Assessment:   Patient tolerated session well  Patient session focused on using HEP, modalities, manual techniques, and TE to improve ability to complete ADLs with ease  Increased  strengthening resistance without increased pain  Patient progressing well towards goals at this time  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

## 2021-01-15 ENCOUNTER — OFFICE VISIT (OUTPATIENT)
Dept: OBGYN CLINIC | Facility: CLINIC | Age: 51
End: 2021-01-15
Payer: COMMERCIAL

## 2021-01-15 ENCOUNTER — APPOINTMENT (OUTPATIENT)
Dept: RADIOLOGY | Facility: CLINIC | Age: 51
End: 2021-01-15
Payer: COMMERCIAL

## 2021-01-15 VITALS
SYSTOLIC BLOOD PRESSURE: 133 MMHG | HEART RATE: 71 BPM | HEIGHT: 69 IN | WEIGHT: 224 LBS | DIASTOLIC BLOOD PRESSURE: 87 MMHG | BODY MASS INDEX: 33.18 KG/M2

## 2021-01-15 DIAGNOSIS — M79.644 BILATERAL THUMB PAIN: ICD-10-CM

## 2021-01-15 DIAGNOSIS — M79.645 BILATERAL THUMB PAIN: ICD-10-CM

## 2021-01-15 DIAGNOSIS — M18.0 ARTHRITIS OF CARPOMETACARPAL (CMC) JOINT OF BOTH THUMBS: Primary | ICD-10-CM

## 2021-01-15 PROCEDURE — 73140 X-RAY EXAM OF FINGER(S): CPT

## 2021-01-15 PROCEDURE — 20600 DRAIN/INJ JOINT/BURSA W/O US: CPT | Performed by: ORTHOPAEDIC SURGERY

## 2021-01-15 PROCEDURE — 99203 OFFICE O/P NEW LOW 30 MIN: CPT | Performed by: ORTHOPAEDIC SURGERY

## 2021-01-15 RX ORDER — LIDOCAINE HYDROCHLORIDE 5 MG/ML
0.5 INJECTION, SOLUTION INFILTRATION; PERINEURAL
Status: COMPLETED | OUTPATIENT
Start: 2021-01-15 | End: 2021-01-15

## 2021-01-15 RX ORDER — TRIAMCINOLONE ACETONIDE 40 MG/ML
20 INJECTION, SUSPENSION INTRA-ARTICULAR; INTRAMUSCULAR
Status: COMPLETED | OUTPATIENT
Start: 2021-01-15 | End: 2021-01-15

## 2021-01-15 RX ORDER — LEVOTHYROXINE SODIUM 0.05 MG/1
50 TABLET ORAL DAILY
COMMUNITY
End: 2021-05-08

## 2021-01-15 RX ADMIN — LIDOCAINE HYDROCHLORIDE 0.5 ML: 5 INJECTION, SOLUTION INFILTRATION; PERINEURAL at 11:20

## 2021-01-15 RX ADMIN — TRIAMCINOLONE ACETONIDE 20 MG: 40 INJECTION, SUSPENSION INTRA-ARTICULAR; INTRAMUSCULAR at 11:20

## 2021-01-15 NOTE — PROGRESS NOTES
Assessment/Plan:  1  Arthritis of carpometacarpal (CMC) joint of both thumbs  Small joint arthrocentesis: R thumb CMC    Thumb Cude comf/Cool   2  Bilateral thumb pain  XR thumb left first digit-min 2v    XR thumb right first digit-min 2v       Scribe Attestation    I,:  Ana Rosa Goodman MA am acting as a scribe while in the presence of the attending physician :       I,:  Cesar Mao DO personally performed the services described in this documentation    as scribed in my presence  :             59-year-old female with bilateral thumb CMC arthritis  X-rays were reviewed  Treatment options were discussed in the form of steroid injections and bracing  She was agreeable to this  She consented and underwent a right thumb CMC injection in the office today without any complications  We will hold off on a left thumb CMC injection at this time due to recent ORIF  She was fitted and provided with comfort cool braces that she can wear as needed  A prescription was provided for Voltaren Gel  She will follow up in 1 month for repeat evaluation and possible left thumb CMC injection  Subjective:   Magui Garcia is a 48 y o  female who presents to the office today for evaluation of right thumb pain  Patient notes pain to the base of her right thumb  Patient notes similar pain on the left  She states her right is worse than her left  This has been ongoing for many years  She was evaluated by her PCP appx 1 year ago and hand x-rays performed and was told to follow up with orthopedics  She notes increased pain with pinch and   Patient recently underwent left distal radius ORIF appx 7 weeks ago by an outside surgeon Dr Roman Kramer  She states since surgery, her left thumb pain has improved  She has been attending formal therapy for her wrist  She denies any numbness or tingling  Patient does have a history of MS  Review of Systems   Constitutional: Negative for chills and fever     HENT: Negative for drooling and sneezing  Eyes: Negative for redness  Respiratory: Negative for cough and wheezing  Gastrointestinal: Negative for nausea and vomiting  Musculoskeletal: Negative for arthralgias, joint swelling and myalgias  Neurological: Negative for weakness and numbness  Psychiatric/Behavioral: Negative for behavioral problems  The patient is not nervous/anxious  Past Medical History:   Diagnosis Date    MS (congenital mitral stenosis)        Past Surgical History:   Procedure Laterality Date    WRIST SURGERY Left 2020       History reviewed  No pertinent family history  Social History     Occupational History    Not on file   Tobacco Use    Smoking status: Never Smoker    Smokeless tobacco: Never Used   Substance and Sexual Activity    Alcohol use: Yes     Comment: Socially    Drug use: Never    Sexual activity: Not on file         Current Outpatient Medications:     fexofenadine (ALLEGRA) 60 MG tablet, Take 60 mg by mouth daily, Disp: , Rfl:     gabapentin (NEURONTIN) 100 mg capsule, Take 100 mg by mouth 2 (two) times a day, Disp: , Rfl:     levothyroxine 50 mcg tablet, Take 50 mcg by mouth daily, Disp: , Rfl:     meloxicam (MOBIC) 15 mg tablet, Take 1 tablet (15 mg total) by mouth daily, Disp: 30 tablet, Rfl: 0    levothyroxine 50 mcg tablet, Take 50 mcg by mouth daily, Disp: , Rfl:     No Known Allergies    Objective:  Vitals:    01/15/21 1047   BP: 133/87   Pulse: 71       Ortho Exam     Right hand    Shoulder deformity base of thumb  No MCP hyperextension  TTP thumb CMC  Mild grind  Compartments soft  Brisk capillary refill  S/m intact median, radial, and ulnar nerve     Left hand    Mild TTP thumb CMC  Compartments soft  Brisk capillary refill  S/m intact median, radial, and ulnar nerve    Physical Exam  Constitutional:       Appearance: She is well-developed  HENT:      Head: Normocephalic and atraumatic  Eyes:      General:         Right eye: No discharge           Left eye: No discharge  Conjunctiva/sclera: Conjunctivae normal    Neck:      Musculoskeletal: Normal range of motion and neck supple  Cardiovascular:      Rate and Rhythm: Normal rate  Pulmonary:      Effort: Pulmonary effort is normal  No respiratory distress  Musculoskeletal:      Comments: As noted in HPI   Skin:     General: Skin is warm and dry  Neurological:      Mental Status: She is alert and oriented to person, place, and time  Psychiatric:         Behavior: Behavior normal          Thought Content: Thought content normal          Judgment: Judgment normal      Small joint arthrocentesis: R thumb CMC  [place] Protocol:  Consent: Verbal consent obtained  Consent given by: patient  Patient identity confirmed: verbally with patient    Supporting Documentation  Indications: pain   Procedure Details  Location: thumb - R thumb CMC  Preparation: Patient was prepped and draped in the usual sterile fashion  Needle size: 27 G  Ultrasound guidance: no  Medications administered: 0 5 mL lidocaine 0 5 %; 20 mg triamcinolone acetonide 40 mg/mL    Patient tolerance: patient tolerated the procedure well with no immediate complications  Dressing:  Sterile dressing applied      I have personally reviewed pertinent films in PACS and my interpretation is as follows:x-ray bilateral thumb performed in the office today demonstrates severe bilateral thumb CMC arthritis

## 2021-01-19 ENCOUNTER — APPOINTMENT (OUTPATIENT)
Dept: PHYSICAL THERAPY | Facility: CLINIC | Age: 51
End: 2021-01-19
Payer: COMMERCIAL

## 2021-01-21 ENCOUNTER — OFFICE VISIT (OUTPATIENT)
Dept: OCCUPATIONAL THERAPY | Facility: CLINIC | Age: 51
End: 2021-01-21
Payer: COMMERCIAL

## 2021-01-21 ENCOUNTER — OFFICE VISIT (OUTPATIENT)
Dept: PHYSICAL THERAPY | Facility: CLINIC | Age: 51
End: 2021-01-21
Payer: COMMERCIAL

## 2021-01-21 DIAGNOSIS — M76.821 TIBIALIS POSTERIOR TENDONITIS, RIGHT: Primary | ICD-10-CM

## 2021-01-21 DIAGNOSIS — S52.552D CLOSED EXTRAARTICULAR FRACTURE OF DISTAL END OF LEFT RADIUS WITH ROUTINE HEALING: Primary | ICD-10-CM

## 2021-01-21 DIAGNOSIS — M79.671 RIGHT FOOT PAIN: ICD-10-CM

## 2021-01-21 PROCEDURE — 97110 THERAPEUTIC EXERCISES: CPT

## 2021-01-21 PROCEDURE — 97140 MANUAL THERAPY 1/> REGIONS: CPT

## 2021-01-21 NOTE — PROGRESS NOTES
Daily Note     Today's date: 2021  Patient name: Carlos Ortega  : 1970  MRN: [id number]  Referring provider: Renae Arias  Dx:   Encounter Diagnosis     ICD-10-CM    1  Closed extraarticular fracture of distal end of left radius with routine healing  S52 552D                   Subjective: Patient reported that she has a pain of 0/10 in her wrist today  Objective: See treatment diary below          Precautions:  ORIF 2020      Manuals 12/15/2020  2020  2020 2021 1/21/21   Grade 2-3 mobs wrist and forearm  x10  x10 x10 x10   STR  X 5 min  x5min x5 min x5 min                   Ther Ex        AROM tendon glides x15 x15 x20     A/PROM wrist flexion/ext/RD/UD x15 x15 x20 x20 x20   A/PROM forearm rotation x15 x15 x20 x20 x20   AROM table top extension x15 x15 x20 x20 x20   Towel scrunch and squeeze  x10 x10 x20 x10   Wrist maze    x10 x10 x10 x10   PREs wrist flex/ext/RD   x20 2 lbs x20 2 lbs x20 4 lbs   gripper   x20 level 3 yellow x20 level 3 yellow Level 3 yellow x20   Digit extension band   x20 red x20 red x20 red   Flex bar all planes   x20 red  x20 red x20 red                    Ther Activity                        Modalities        MHP 5 min   5" 6 min                Assessment:   Patient tolerated session well  Patient session focused on using HEP, modalities, manual techniques, and TE to improve ability to complete ADLs with ease  Increased  strengthening resistance without increased pain  Patient progressing well towards goals at this time  Patient benefiting from skilled OT to reduce dependence with ADLs  Plan:   Focus on range of motion and strengthening to improve ability to complete ADLs with ease  Increase strengthening as tolerated

## 2021-01-21 NOTE — PROGRESS NOTES
Daily Note     Today's date: 2021  Patient name: Mark Marquez  : 1970  MRN: [id number]  Referring provider: No ref  provider found  Dx:   Encounter Diagnosis     ICD-10-CM    1  Tibialis posterior tendonitis, right  M76 821    2  Right foot pain  M79 671        Start Time: 1400  Stop Time: 1445  Total time in clinic (min): 45 minutes    Subjective: Patient denies R foot pain today  When she is walking around, she notices increased pain on the lateral aspect of her R foot  Objective: See treatment diary below      Assessment: Tolerated treatment well  Pain noted with eccentric control  STM dissipates pain in L foot  KT tape applied for medial swelling  Patient would benefit from continued PT to decrease pain, improve intrinsic strength, and maximize function for return to workout routine  Plan: Continue per plan of care        Precautions: DR CALLAHAN 2020, MS You      STM R plantar fascia   AR AR     PRE R ankle        KT tape arch support, swelling   AR             Neuro Re-Ed        Towel scrunch x20 3 x 30s 3 x 30s     Intrinsic strength x20 3s x 15 3s x 15     Balance   3x10s on foam             Ther Ex        NuStep warmup  5' 10' L2     Gastroc stretch 3x10s  3x10s     Ankle 4 way x10 YTB 20 YTB Rev     SLR flex, abd  NV      LAQ  20 20     Wobble board  30 30 stand     Pro stretch  5 x 10s 5 x 10s     Soleus stretch  5 x 10s foam 5 x 10s foam     Standing great toe ext  10 20     Seated great toe ext  20 20     HR   20, x10 with eccentrics     Gait Training                        Modalities        Ice post  Def

## 2021-01-26 ENCOUNTER — OFFICE VISIT (OUTPATIENT)
Dept: OCCUPATIONAL THERAPY | Facility: CLINIC | Age: 51
End: 2021-01-26
Payer: COMMERCIAL

## 2021-01-26 ENCOUNTER — OFFICE VISIT (OUTPATIENT)
Dept: PHYSICAL THERAPY | Facility: CLINIC | Age: 51
End: 2021-01-26
Payer: COMMERCIAL

## 2021-01-26 DIAGNOSIS — M76.821 TIBIALIS POSTERIOR TENDONITIS, RIGHT: Primary | ICD-10-CM

## 2021-01-26 DIAGNOSIS — M79.671 RIGHT FOOT PAIN: ICD-10-CM

## 2021-01-26 DIAGNOSIS — S52.552D CLOSED EXTRAARTICULAR FRACTURE OF DISTAL END OF LEFT RADIUS WITH ROUTINE HEALING: Primary | ICD-10-CM

## 2021-01-26 PROCEDURE — 97110 THERAPEUTIC EXERCISES: CPT

## 2021-01-26 PROCEDURE — 97140 MANUAL THERAPY 1/> REGIONS: CPT

## 2021-01-26 NOTE — PROGRESS NOTES
Daily Note     Today's date: 2021  Patient name: Radha Rocha  : 1970  MRN: [id number]  Referring provider: No ref  provider found  Dx:   Encounter Diagnosis     ICD-10-CM    1  Tibialis posterior tendonitis, right  M76 821    2  Right foot pain  M79 671        Start Time: 1230  Stop Time: 1315  Total time in clinic (min): 45 minutes    Subjective: Patient reports 1/10 R foot pain today  She reports her pain is "uncomfortable " She has been sorting through her items for her trip to [place] next week  Objective: See treatment diary below      Assessment: Tolerated treatment well  Progressing well through program  Able to achieve heel raise with eccentric control this visit  No pain noted post tx  Patient would benefit from continued PT to improve R ankle ROM, strength, and maximize function for independence in community  Plan: Continue per plan of care        Precautions: DR CALLAHAN 2020, MS        Manuals     STM R plantar fascia   AR AR AR    PRE R ankle        KT tape arch support, swelling   AR AR            Neuro Re-Ed        Towel scrunch x20 3 x 30s 3 x 30s     Intrinsic strength x20 3s x 15 3s x 15 3s x 15    Balance   3x10s on foam 3x10s on foam            Ther Ex        NuStep warmup  5' 10' L2 5' L3    Gastroc stretch 3x10s  3x10s 3x10s    Ankle 4 way x10 YTB 20 YTB Rev     SLR flex, abd  NV      LAQ  20 20     Wobble board  30 30 stand 30 stand    Pro stretch  5 x 10s 5 x 10s 10 x 10s    Soleus stretch  5 x 10s foam 5 x 10s foam 5 x 10s foam    Standing great toe ext  10 20 20    Seated great toe ext  20 20 D/C    HR   20, x10 with eccentrics 20, x10 with eccentrics    Gait Training                        Modalities        Ice post  Def  Def

## 2021-01-26 NOTE — PROGRESS NOTES
Daily Note     Today's date: 2021  Patient name: Shoshana Richmond  : 1970  MRN: [id number]  Referring provider: Carey Crews  Dx:   Encounter Diagnosis     ICD-10-CM    1  Closed extraarticular fracture of distal end of left radius with routine healing  S52 552D                   Subjective: Patient reported that she has a pain of 0/10 in her wrist today  Objective: See treatment diary below          Precautions:  ORIF 2020      Manuals 12/15/2020  2020  2020 2021 1/21/21   Grade 2-3 mobs wrist and forearm  x10  x10 x5 min  x10   STR  X 5 min  x5min x5 min x5 min                   Ther Ex        AROM tendon glides x15 x15 x20     A/PROM wrist flexion/ext/RD/UD x15 x15 x20 x20 x20   A/PROM forearm rotation x15 x15 x20 x20 x20   AROM table top extension x15 x15 x20 x20 x20   Towel scrunch and squeeze  x10 x10 x20 x10   Wrist maze    x10 x10 x10 x10   PREs wrist flex/ext/RD   x20 2 lbs x20 4 lbs x20 4 lbs   gripper   x20 level 3 yellow x20 level 3 yellow Level 3 yellow x20   Digit extension band   x20 red x20 red x20 red   Flex bar all planes   x20 red  x20 green x20 red                    Ther Activity                        Modalities        MHP 5 min   5" 6 min                Assessment:   Patient tolerated session well  Patient session focused on using HEP, modalities, manual techniques, and TE to improve ability to complete ADLs with ease  Increases in TE with no increased pain noted at this time  Patient progressing well towards goals at this time  Patient benefiting from skilled OT to reduce dependence with ADLs  Plan:   Focus on range of motion and strengthening to improve ability to complete ADLs with ease  Increase strengthening as tolerated

## 2021-01-28 ENCOUNTER — OFFICE VISIT (OUTPATIENT)
Dept: PHYSICAL THERAPY | Facility: CLINIC | Age: 51
End: 2021-01-28
Payer: COMMERCIAL

## 2021-01-28 ENCOUNTER — OFFICE VISIT (OUTPATIENT)
Dept: OCCUPATIONAL THERAPY | Facility: CLINIC | Age: 51
End: 2021-01-28
Payer: COMMERCIAL

## 2021-01-28 DIAGNOSIS — S52.552D CLOSED EXTRAARTICULAR FRACTURE OF DISTAL END OF LEFT RADIUS WITH ROUTINE HEALING: Primary | ICD-10-CM

## 2021-01-28 DIAGNOSIS — M79.671 RIGHT FOOT PAIN: ICD-10-CM

## 2021-01-28 DIAGNOSIS — M76.821 TIBIALIS POSTERIOR TENDONITIS, RIGHT: Primary | ICD-10-CM

## 2021-01-28 PROCEDURE — 97140 MANUAL THERAPY 1/> REGIONS: CPT

## 2021-01-28 PROCEDURE — 97110 THERAPEUTIC EXERCISES: CPT

## 2021-01-28 NOTE — PROGRESS NOTES
Daily Note     Today's date: 2021  Patient name: Oneyda Mills  : 1970  MRN: [id number]  Referring provider: No ref  provider found  Dx:   Encounter Diagnosis     ICD-10-CM    1  Tibialis posterior tendonitis, right  M76 821    2  Right foot pain  M79 671        Start Time: 1315  Stop Time: 1400  Total time in clinic (min): 45 minutes    Subjective: Patient denies pain in R foot today  She is able to stand and walk with minimal pain throughout her day  Objective: See treatment diary below      Assessment: Tolerated treatment well  Patient able to tolerate all standing exercises  No pain noted post tx  Provided comprehensive home exercise program for trip to [place]  Patient would benefit from continued PT to improve R foot and ankle strength, and maximize endurance for independence in community  Plan: Continue per plan of care           Precautions: DR CALLAHAN 2020, MS        Manuals    STM R plantar fascia   AR AR AR AR   PRE R ankle        KT tape arch support, swelling   AR AR AR           Neuro Re-Ed        Towel scrunch x20 3 x 30s 3 x 30s     Intrinsic strength x20 3s x 15 3s x 15 3s x 15 3s x 15   Balance   3x10s on foam 3x10s on foam 3x10s on foam           Ther Ex        NuStep warmup  5' 10' L2 5' L3 5' L3   Gastroc stretch 3x10s  3x10s 3x10s 3x10s   Ankle 4 way x10 YTB 20 YTB Rev     SLR flex, abd  NV      LAQ  20 20     Wobble board  30 30 stand 30 stand 30 stand   Pro stretch  5 x 10s 5 x 10s 10 x 10s 10 x 10s   Soleus stretch  5 x 10s foam 5 x 10s foam 5 x 10s foam 5 x 10s foam   Standing great toe ext  10 20 20 20   Seated great toe ext  20 20 D/C    HR   20, x10 with eccentrics 20, x10 with eccentrics 20   Lunge on bosu     10 B                   Modalities        Ice post  Def  Def

## 2021-01-28 NOTE — PROGRESS NOTES
Daily Note     Today's date: 2021  Patient name: Bette Mandel  : 1970  MRN: [id number]  Referring provider: Deanna Ralph  Dx:   Encounter Diagnosis     ICD-10-CM    1  Closed extraarticular fracture of distal end of left radius with routine healing  S52 552D        Start Time: 1400  Stop Time: 1440  Total time in clinic (min): 40 minutes    Subjective: Patient reported that she has a pain of 0/10 in her wrist today  Patient reported that she continues with difficulty with fine motor tasks  Objective: See treatment diary below          Precautions:  ORIF 2020      Manuals 12/15/2020  2020  2020 2021 1/21/21   Grade 2-3 mobs wrist and forearm  x10  x5 min x5 min  x10   STR  X 5 min  x5min x5 min x5 min                   Ther Ex        AROM tendon glides x15 x15      A/PROM wrist flexion/ext/RD/UD x15 x15 x20 x20 x20   A/PROM forearm rotation x15 x15 x20 x20 x20   AROM table top extension x15 x15 x20 x20 x20   Towel scrunch and squeeze  x10 x10 x20 x10   Wrist maze    x10 x10 x10 x10   PREs wrist flex/ext/RD   x20 4 lbs x20 4 lbs x20 4 lbs   gripper   x20 level 3 yellow x20 level 3 yellow Level 3 yellow x20   Digit extension band   x20 red x20 red x20 red   Flex bar all planes   x20 green x20 green x20 red                    Ther Activity        Weight bearing   40 lbs x15   5 sec             Modalities        MHP 5 min   6 min 6 min                Assessment:   Patient tolerated session well  Patient session focused on modalities, manual techniques, and TE to improve ability to complete ADLs with ease  Patient tolerated weight bearing exercise without complaints of pain  Patient progressing well towards goals at this time  Patient benefiting from skilled OT to reduce dependence with ADLs  Plan:   Focus on range of motion and strengthening to improve ability to complete ADLs with ease  Increase strengthening as tolerated

## 2021-02-01 NOTE — PROGRESS NOTES
Assessment/Plan:    The patient evaluated, treatment options discussed the patient, patient to continue physical therapy, patient reviewed in [facility] therapy, patient educated on proper shoe gear and the use of supportive orthotics, MRI ordered to evaluate extent of posterior tibial tendinitis, and possible surgical planning if necessary, patient return for MRI  Diagnoses and all orders for this visit:    Tibialis posterior tendinitis, right  -     meloxicam (MOBIC) 15 mg tablet; Take 1 tablet (15 mg total) by mouth daily  -     MRI ankle/heel right  wo contrast; Future    Right foot pain    Difficulty walking    Localized edema    Other orders  -     levothyroxine 50 mcg tablet; Take 50 mcg by mouth daily  -     fexofenadine (ALLEGRA) 60 MG tablet; Take 60 mg by mouth daily  -     gabapentin (NEURONTIN) 100 mg capsule; Take 100 mg by mouth 2 (two) times a day          Subjective:      Patient ID: Sol William is a 48 y o  female  77-year-old female with no pertinent Podiatry past medical history presents to the office for a chronic history of pain to the right foot, patient states that she feels pain under the arch, patient states that the pain increases upon elongated periods of ambulation and in weight-bearing, patient states the pain is better in a supportive sneakers, and worse on flat shoe gear, patient had x-rays performed, negative fracture, patient is currently continue physical therapy sessions patient denies trauma to the foot, patient denies recent constitutional symptoms      The following portions of the patient's history were reviewed and updated as appropriate: allergies, current medications, past family history, past medical history, past social history, past surgical history and problem list     Review of Systems   Constitutional: Negative  Respiratory: Negative  Cardiovascular: Negative  Musculoskeletal: Positive for gait problem and joint swelling  Skin: Negative  Historical Information   Past Medical History:   Diagnosis Date    MS (congenital mitral stenosis)      Past Surgical History:   Procedure Laterality Date    WRIST SURGERY Left 2020     Social History   Social History     Substance and Sexual Activity   Alcohol Use Yes    Comment: Socially     Social History     Substance and Sexual Activity   Drug Use Never     Social History     Tobacco Use   Smoking Status Never Smoker   Smokeless Tobacco Never Used     Family History: No family history on file  Meds/Allergies   all medications and allergies reviewed  No Known Allergies    Objective:      /68   Pulse 75   Resp 18   Ht 5' 9" (1 753 m)   Wt 102 kg (224 lb)   BMI 33 08 kg/m²          Physical Exam  Constitutional:       General: She is not in acute distress  Appearance: She is well-developed  She is not ill-appearing, toxic-appearing or diaphoretic  HENT:      Head: Normocephalic and atraumatic  Cardiovascular:      Pulses: Normal pulses  Dorsalis pedis pulses are 2+ on the right side and 2+ on the left side  Posterior tibial pulses are 2+ on the right side and 2+ on the left side  Comments: Palpable pedal pulse, CFT is less than 3 seconds, temperature gradient within normal limits, pedal hair present, no trophic skin changes  Pulmonary:      Effort: No respiratory distress  Musculoskeletal: Normal range of motion  Comments: Pain on palpation along the posterior tibial tendon insertion and course on the right foot, mild localized edema, pain on palpation to sinus tarsi bilateral, pes planus type of foot bilateral, patient pronated in gait and stance, patient is unable to perform heel raise test on the right   Feet:      Right foot:      Protective Sensation: 10 sites tested  10 sites sensed  Skin integrity: No ulcer, blister, skin breakdown or erythema  Left foot:      Protective Sensation: 10 sites tested  10 sites sensed        Skin integrity: No ulcer, blister, skin breakdown or erythema  Skin:     Capillary Refill: Capillary refill takes 2 to 3 seconds  Coloration: Skin is not pale  Comments: No open lesion, no clinical signs of infection   Neurological:      Mental Status: She is alert and oriented to person, place, and time  Comments:  muscle power 5/5, protective sensations intact, no focal motor deficit        Foot Exam    Right Foot/Ankle     Inspection and Palpation  Skin Exam: no blister, no maceration, no ulcer and no erythema     Neurovascular  Dorsalis pedis: 2+  Posterior tibial: 2+      Left Foot/Ankle      Inspection and Palpation  Skin Exam: no blister, no maceration, no ulcer and no erythema     Neurovascular  Dorsalis pedis: 2+  Posterior tibial: 2+

## 2021-02-02 ENCOUNTER — APPOINTMENT (OUTPATIENT)
Dept: PHYSICAL THERAPY | Facility: CLINIC | Age: 51
End: 2021-02-02
Payer: COMMERCIAL

## 2021-02-16 ENCOUNTER — APPOINTMENT (OUTPATIENT)
Dept: PHYSICAL THERAPY | Facility: CLINIC | Age: 51
End: 2021-02-16
Payer: COMMERCIAL

## 2021-02-18 ENCOUNTER — APPOINTMENT (OUTPATIENT)
Dept: PHYSICAL THERAPY | Facility: CLINIC | Age: 51
End: 2021-02-18
Payer: COMMERCIAL

## 2021-02-24 ENCOUNTER — TRANSCRIBE ORDERS (OUTPATIENT)
Dept: PHYSICAL THERAPY | Facility: CLINIC | Age: 51
End: 2021-02-24

## 2021-02-24 ENCOUNTER — OFFICE VISIT (OUTPATIENT)
Dept: PHYSICAL THERAPY | Facility: CLINIC | Age: 51
End: 2021-02-24
Payer: COMMERCIAL

## 2021-02-24 DIAGNOSIS — M76.821 TIBIALIS POSTERIOR TENDONITIS, RIGHT: Primary | ICD-10-CM

## 2021-02-24 DIAGNOSIS — M79.671 RIGHT FOOT PAIN: ICD-10-CM

## 2021-02-24 PROCEDURE — 97110 THERAPEUTIC EXERCISES: CPT

## 2021-02-24 NOTE — PROGRESS NOTES
PT Re-evaluation     Today's date: 2021  Patient name: Gerilyn Bloch  : 1970  MRN: [id number]  Referring provider: Kendra Koehler  Dx:   Encounter Diagnosis     ICD-10-CM    1  Tibialis posterior tendonitis, right  M76 821    2  Right foot pain  M79 671        Start Time: 1230  Stop Time: 1315  Total time in clinic (min): 45 minutes    Subjective: Patient was in [place] for the last 2 weeks, walking around [place] every day  She states that she walking greater than 5 miles each day  She did not notice increased pain in R foot when she was walking around  She is wearing compression sleeves to help improve function during ambulation  Functionally, she reports 50% improvement in function since the start of PT  She reports difficulties with performing ambulation greater than 1 hour, standing greater than 1 hour, and stair navigation  Objective: See treatment diary below  Goals  STGs to be achieved in 4 weeks  -STG 1: Patient will be independent with HEP  -MET  -STG 2: Patient will have 0/10 R foot pain at rest  -MET  -STG 3: Patient will increase R great toe ROM to within normal limits  -MET  -STG 4: Patient will report 40% functional improvement  -MET  -STG 5: Patient will demonstrate 1/2 grade MMT improvement in R ankle  -MET  -STG 6: Patient will be able to stand for greater than 15 minutes with no pain  -MET    LTGs to be achieved in 8 weeks  -LTG 1: Patient will demonstrate independence with maintenance program  -MET  -LTG 2: Patient will perform all functional activities with less than 2/10 R ankle/foot pain  -NOT MET  -LTG 3: Patient will improve FOTO score by 10 points  -LTG 4: Patient will report 80% functional improvement  -NOT MET  -LTG 5: Patient will be able to stand for greater than 60 minutes with no pain  -MET  -LTG 6: Patient will demonstrate 1 grade MMT improvement in R ankle   -NOT MET    Active Range of Motion   Left Ankle/Foot   Normal active range of motion    Right Ankle/Foot   Dorsiflexion (ke): 5 degrees   Dorsiflexion (kf): 5 degrees   Plantar flexion: WFL  Inversion: WFL  Eversion: WFL  Great toe extension: 10 degrees     Passive Range of Motion   Left Ankle/Foot  Normal passive range of motion    Right Ankle/Foot    Dorsiflexion (ke): 7 degrees   Dorsiflexion (kf): 7 degrees    Plantar flexion: WFL  Inversion: WFL  Eversion: WFL  Great toe extension: 15 degrees     Strength/Myotome Testing     Left Ankle/Foot   Normal strength    Right Ankle/Foot   Dorsiflexion: 4  Plantar flexion: 4  Inversion: 4-  Eversion: 4-  Great toe flexion: 5  Great toe extension: 4-    Quality of Movement During Gait     Ankle    Ankle (Left): Positive pronated  Ankle (Right): Positive pronated  Foot Alignment    Foot Alignment (Left): Positive flat foot  Foot Alignment (Right): Positive flat foot  FOTO goal: 52%   IE: 32%   2/24/2021: 54%    Assessment: Patient demonstrates improvements in function as measured by FOTO, strength, and ROM of R ankle  She reports difficulties with performing stair navigation, standing and walking greater than 1 hour, and complains of persistent swelling when weighted activities  R ankle instability still present when performing unilateral stance  Tolerated treatment well  Patient would benefit from continued PT to maximize strength for independence in community  Plan: Continue per plan of care           Precautions: DR CALLAHAN 11/25/2020        Manuals 1/12 1/14 1/21 1/26 1/28 2/24   STM R plantar fascia   AR AR AR AR AR   PRE R ankle         KT tape arch support, swelling   AR AR AR             Neuro Re-Ed         Towel scrunch x20 3 x 30s 3 x 30s   3 x 30s   Intrinsic strength x20 3s x 15 3s x 15 3s x 15 3s x 15    Balance   3x10s on foam 3x10s on foam 3x10s on foam 3x10s on foam            Ther Ex         NuStep warmup  5' 10' L2 5' L3 5' L3 10' L3   Gastroc stretch 3x10s  3x10s 3x10s 3x10s    Ankle 4 way x10 YTB 20 YTB Rev      LAQ  20 20 Wobble board  30 30 stand 30 stand 30 stand    Pro stretch  5 x 10s 5 x 10s 10 x 10s 10 x 10s 10 x 10s   Soleus stretch  5 x 10s foam 5 x 10s foam 5 x 10s foam 5 x 10s foam 5 x 10s foam   Standing great toe ext  10 20 20 20    Seated great toe ext  20 20 D/C     HR   20, x10 with eccentrics 20, x10 with eccentrics 20 20   Lunge on bosu     10 B                      Modalities         Ice post  Def  Def

## 2021-02-24 NOTE — LETTER
2021    [address]  [facility] [address]  [address]     Patient: Gordo Hull   YOB: 1970   Date of Visit: 2021     Encounter Diagnosis     ICD-10-CM    1  Tibialis posterior tendonitis, right  M76 821    2  Right foot pain  M79 671        Dear Dr Iron Mccloud: Thank you for your recent referral of Gordo Hull  Please review the attached evaluation summary from Roxi's recent visit  Please verify that you agree with the plan of care by signing the attached order  If you have any questions or concerns, please do not hesitate to call  I sincerely appreciate the opportunity to share in the care of one of your patients and hope to have another opportunity to work with you in the near future  Sincerely,    Jayne David, PT      Referring Provider:      I certify that I have read the below Plan of Care and certify the need for these services furnished under this plan of treatment while under my care  Uche Patel  [address] [place] [address]  Via Fax: [phone]          PT Re-evaluation     Today's date: 2021  Patient name: Gordo Hull  : 1970  MRN: [id number]  Referring provider: Candelaria Witt  Dx:   Encounter Diagnosis     ICD-10-CM    1  Tibialis posterior tendonitis, right  M76 821    2  Right foot pain  M79 671        Start Time: 1230  Stop Time: 1315  Total time in clinic (min): 45 minutes    Subjective: Patient was in [place] for the last 2 weeks, walking around [place] every day  She states that she walking greater than 5 miles each day  She did not notice increased pain in R foot when she was walking around  She is wearing compression sleeves to help improve function during ambulation  Functionally, she reports 50% improvement in function since the start of PT  She reports difficulties with performing ambulation greater than 1 hour, standing greater than 1 hour, and stair navigation        Objective: See treatment diary below  Goals  STGs to be achieved in 4 weeks  -STG 1: Patient will be independent with HEP  -MET  -STG 2: Patient will have 0/10 R foot pain at rest  -MET  -STG 3: Patient will increase R great toe ROM to within normal limits  -MET  -STG 4: Patient will report 40% functional improvement  -MET  -STG 5: Patient will demonstrate 1/2 grade MMT improvement in R ankle  -MET  -STG 6: Patient will be able to stand for greater than 15 minutes with no pain  -MET    LTGs to be achieved in 8 weeks  -LTG 1: Patient will demonstrate independence with maintenance program  -MET  -LTG 2: Patient will perform all functional activities with less than 2/10 R ankle/foot pain  -NOT MET  -LTG 3: Patient will improve FOTO score by 10 points  -LTG 4: Patient will report 80% functional improvement  -NOT MET  -LTG 5: Patient will be able to stand for greater than 60 minutes with no pain  -MET  -LTG 6: Patient will demonstrate 1 grade MMT improvement in R ankle  -NOT MET    Active Range of Motion   Left Ankle/Foot   Normal active range of motion    Right Ankle/Foot   Dorsiflexion (ke): 5 degrees   Dorsiflexion (kf): 5 degrees   Plantar flexion: WFL  Inversion: WFL  Eversion: WFL  Great toe extension: 10 degrees     Passive Range of Motion   Left Ankle/Foot  Normal passive range of motion    Right Ankle/Foot    Dorsiflexion (ke): 7 degrees   Dorsiflexion (kf): 7 degrees    Plantar flexion: WFL  Inversion: WFL  Eversion: WFL  Great toe extension: 15 degrees     Strength/Myotome Testing     Left Ankle/Foot   Normal strength    Right Ankle/Foot   Dorsiflexion: 4  Plantar flexion: 4  Inversion: 4-  Eversion: 4-  Great toe flexion: 5  Great toe extension: 4-    Quality of Movement During Gait     Ankle    Ankle (Left): Positive pronated  Ankle (Right): Positive pronated  Foot Alignment    Foot Alignment (Left): Positive flat foot  Foot Alignment (Right): Positive flat foot      FOTO goal: 52%   IE: 32%   2/24/2021: 54%    Assessment: Patient demonstrates improvements in function as measured by FOTO, strength, and ROM of R ankle  She reports difficulties with performing stair navigation, standing and walking greater than 1 hour, and complains of persistent swelling when weighted activities  R ankle instability still present when performing unilateral stance  Tolerated treatment well  Patient would benefit from continued PT to maximize strength for independence in community  Plan: Continue per plan of care           Precautions: DR CALLAHAN 11/25/2020        Manuals 1/12 1/14 1/21 1/26 1/28 2/24   STM R plantar fascia   AR AR AR AR AR   PRE R ankle         KT tape arch support, swelling   AR AR AR             Neuro Re-Ed         Towel scrunch x20 3 x 30s 3 x 30s   3 x 30s   Intrinsic strength x20 3s x 15 3s x 15 3s x 15 3s x 15    Balance   3x10s on foam 3x10s on foam 3x10s on foam 3x10s on foam            Ther Ex         NuStep warmup  5' 10' L2 5' L3 5' L3 10' L3   Gastroc stretch 3x10s  3x10s 3x10s 3x10s    Ankle 4 way x10 YTB 20 YTB Rev      LAQ  20 20      Wobble board  30 30 stand 30 stand 30 stand    Pro stretch  5 x 10s 5 x 10s 10 x 10s 10 x 10s 10 x 10s   Soleus stretch  5 x 10s foam 5 x 10s foam 5 x 10s foam 5 x 10s foam 5 x 10s foam   Standing great toe ext  10 20 20 20    Seated great toe ext  20 20 D/C     HR   20, x10 with eccentrics 20, x10 with eccentrics 20 20   Lunge on bosu     10 B                      Modalities         Ice post  Def  Def

## 2021-02-26 ENCOUNTER — OFFICE VISIT (OUTPATIENT)
Dept: PHYSICAL THERAPY | Facility: CLINIC | Age: 51
End: 2021-02-26
Payer: COMMERCIAL

## 2021-02-26 ENCOUNTER — OFFICE VISIT (OUTPATIENT)
Dept: OBGYN CLINIC | Facility: CLINIC | Age: 51
End: 2021-02-26
Payer: COMMERCIAL

## 2021-02-26 VITALS
DIASTOLIC BLOOD PRESSURE: 82 MMHG | HEART RATE: 78 BPM | WEIGHT: 224 LBS | BODY MASS INDEX: 33.18 KG/M2 | HEIGHT: 69 IN | SYSTOLIC BLOOD PRESSURE: 130 MMHG

## 2021-02-26 DIAGNOSIS — M79.671 RIGHT FOOT PAIN: ICD-10-CM

## 2021-02-26 DIAGNOSIS — M76.821 TIBIALIS POSTERIOR TENDONITIS, RIGHT: Primary | ICD-10-CM

## 2021-02-26 DIAGNOSIS — M18.0 ARTHRITIS OF CARPOMETACARPAL (CMC) JOINT OF BOTH THUMBS: Primary | ICD-10-CM

## 2021-02-26 PROCEDURE — 20600 DRAIN/INJ JOINT/BURSA W/O US: CPT | Performed by: ORTHOPAEDIC SURGERY

## 2021-02-26 PROCEDURE — 99213 OFFICE O/P EST LOW 20 MIN: CPT | Performed by: ORTHOPAEDIC SURGERY

## 2021-02-26 PROCEDURE — 97110 THERAPEUTIC EXERCISES: CPT

## 2021-02-26 RX ORDER — LIDOCAINE HYDROCHLORIDE 5 MG/ML
0.5 INJECTION, SOLUTION INFILTRATION; PERINEURAL
Status: COMPLETED | OUTPATIENT
Start: 2021-02-26 | End: 2021-02-26

## 2021-02-26 RX ORDER — TRIAMCINOLONE ACETONIDE 40 MG/ML
20 INJECTION, SUSPENSION INTRA-ARTICULAR; INTRAMUSCULAR
Status: COMPLETED | OUTPATIENT
Start: 2021-02-26 | End: 2021-02-26

## 2021-02-26 RX ADMIN — TRIAMCINOLONE ACETONIDE 20 MG: 40 INJECTION, SUSPENSION INTRA-ARTICULAR; INTRAMUSCULAR at 10:06

## 2021-02-26 RX ADMIN — LIDOCAINE HYDROCHLORIDE 0.5 ML: 5 INJECTION, SOLUTION INFILTRATION; PERINEURAL at 10:06

## 2021-02-26 NOTE — PROGRESS NOTES
Daily Note     Today's date: 2021  Patient name: Lina Ball  : 1970  MRN: [id number]  Referring provider: Caridad Anand  Dx:   Encounter Diagnosis     ICD-10-CM    1  Tibialis posterior tendonitis, right  M76 821    2  Right foot pain  M79 671        Start Time: 1230  Stop Time: 1300  Total time in clinic (min): 30 minutes    Subjective: Patient denies pain of R ankle and foot today  She reports that she is having increased lateral R ankle pain due to inability to prevent pronation  Objective: See treatment diary below      Assessment: Patient demonstrates pronation during standing, as intrinsic strength is lacking at this time  Leukotape was applied for sling support of R plantar fascia  Patient wanted to leave session early due to son's orthopedic appointment  Encouraged to call orthopedic specialist for possible orthotic prescription, due to continued pain and lack of intrinsic strength  Verbalized understanding  Tolerated treatment well  Patient would benefit from continued PT to improve intrinsic strength for improved windlass effect  Plan: Continue per plan of care           Precautions: DR CALLAHAN 2020        Manuals    STM R plantar fascia  AR AR AR AR    PRE R ankle        KT tape arch support, swelling AR AR AR  Leukotape sling performed           Neuro Re-Ed        Towel scrunch 3 x 30s   3 x 30s    Intrinsic strength 3s x 15 3s x 15 3s x 15     Balance 3x10s on foam 3x10s on foam 3x10s on foam 3x10s on foam 3x10s on foam           Ther Ex        NuStep warmup 10' L2 5' L3 5' L3 10' L3 5' L3   Gastroc stretch 3x10s 3x10s 3x10s  3x10s   Ankle 4 way Rev       LAQ 20       Wobble board 30 stand 30 stand 30 stand  30 stand   Pro stretch 5 x 10s 10 x 10s 10 x 10s 10 x 10s 10x10s   Soleus stretch 5 x 10s foam 5 x 10s foam 5 x 10s foam 5 x 10s foam 5x10s   Standing great toe ext 20 20 20     Seated great toe ext 20 D/C      HR 20, x10 with eccentrics 20, x10 with eccentrics 20 20 20, eccentric 10   Lunge on bosu   10 B                     Modalities        Ice post  Def

## 2021-02-26 NOTE — PROGRESS NOTES
Assessment/Plan:  1  Arthritis of carpometacarpal (CMC) joint of both thumbs  Small joint arthrocentesis: L thumb CMC       Scribe Attestation    I,:  Ana Rosa Goodman MA am acting as a scribe while in the presence of the attending physician :       I,:  Néstor Castillo DO personally performed the services described in this documentation    as scribed in my presence  :             77-year-old female with bilateral thumb CMC arthritis  Patient has been seeing good relief from previous right thumb CMC injection  She consented and underwent a left thumb CMC injection in the office today without any complications  She will continue with the comfort cool braces as needed  She may follow up with me as needed  Subjective:   Vic Moreno is a 48 y o  female who presents to the office today for follow up evaluation bilateral thumb CMC arthritis  Patient underwent a right thumb CMC injection at her last visit on 1/15/21 which she states did provide her with relief  Patient states she will notice and intermittent "twinge" about the base of her thumb but states this has improved  We did hold off on a left thumb CMC injection at her last visit due to recent ORIF of her left wrist        Review of Systems   Constitutional: Negative for chills and fever  HENT: Negative for drooling and sneezing  Eyes: Negative for redness  Respiratory: Negative for cough and wheezing  Gastrointestinal: Negative for nausea and vomiting  Musculoskeletal: Negative for arthralgias, joint swelling and myalgias  Neurological: Negative for weakness and numbness  Psychiatric/Behavioral: Negative for behavioral problems  The patient is not nervous/anxious  Past Medical History:   Diagnosis Date    MS (congenital mitral stenosis)        Past Surgical History:   Procedure Laterality Date    WRIST SURGERY Left 2020       History reviewed  No pertinent family history      Social History     Occupational History    Not on file Tobacco Use    Smoking status: Never Smoker    Smokeless tobacco: Never Used   Substance and Sexual Activity    Alcohol use: Yes     Comment: Socially    Drug use: Never    Sexual activity: Not on file         Current Outpatient Medications:     fexofenadine (ALLEGRA) 60 MG tablet, Take 60 mg by mouth daily, Disp: , Rfl:     gabapentin (NEURONTIN) 100 mg capsule, Take 100 mg by mouth 2 (two) times a day, Disp: , Rfl:     levothyroxine 50 mcg tablet, Take 50 mcg by mouth daily, Disp: , Rfl:     levothyroxine 50 mcg tablet, Take 50 mcg by mouth daily, Disp: , Rfl:     meloxicam (MOBIC) 15 mg tablet, Take 1 tablet (15 mg total) by mouth daily, Disp: 30 tablet, Rfl: 0    No Known Allergies    Objective:  Vitals:    02/26/21 0943   BP: 130/82   Pulse: 78       Ortho Exam     Bilateral thumb    TTP left thumb CMC  NTTP right thumb CMC  + grind   Compartments soft  Brisk capillary refill  S/m intact median, radial, and ulnar nerve     Physical Exam  Constitutional:       Appearance: She is well-developed  HENT:      Head: Normocephalic and atraumatic  Eyes:      General:         Right eye: No discharge  Left eye: No discharge  Conjunctiva/sclera: Conjunctivae normal    Neck:      Musculoskeletal: Normal range of motion and neck supple  Cardiovascular:      Rate and Rhythm: Normal rate  Pulmonary:      Effort: Pulmonary effort is normal  No respiratory distress  Musculoskeletal:      Comments: As noted in HPI   Skin:     General: Skin is warm and dry  Neurological:      Mental Status: She is alert and oriented to person, place, and time  Psychiatric:         Behavior: Behavior normal          Thought Content: Thought content normal          Judgment: Judgment normal      Small joint arthrocentesis: L thumb CMC  [place] Protocol:  Consent: Verbal consent obtained    Consent given by: patient  Patient identity confirmed: verbally with patient    Supporting Documentation  Indications: pain   Procedure Details  Location: thumb - L thumb CMC  Preparation: Patient was prepped and draped in the usual sterile fashion  Needle size: 27 G  Ultrasound guidance: no  Medications administered: 0 5 mL lidocaine 0 5 %; 20 mg triamcinolone acetonide 40 mg/mL    Patient tolerance: patient tolerated the procedure well with no immediate complications  Dressing:  Sterile dressing applied

## 2021-03-04 ENCOUNTER — OFFICE VISIT (OUTPATIENT)
Dept: PHYSICAL THERAPY | Facility: CLINIC | Age: 51
End: 2021-03-04
Payer: COMMERCIAL

## 2021-03-04 DIAGNOSIS — M79.671 RIGHT FOOT PAIN: ICD-10-CM

## 2021-03-04 DIAGNOSIS — M76.821 TIBIALIS POSTERIOR TENDONITIS, RIGHT: Primary | ICD-10-CM

## 2021-03-04 PROCEDURE — 97110 THERAPEUTIC EXERCISES: CPT

## 2021-03-04 NOTE — PROGRESS NOTES
Daily Note     Today's date: 3/4/2021  Patient name: Shoshana Richmond  : 1970  MRN: [id number]  Referring provider: Carey Crews  Dx:   Encounter Diagnosis     ICD-10-CM    1  Tibialis posterior tendonitis, right  M76 821    2  Right foot pain  M79 671                   Subjective: Pt reports that she feels mild discomfort this afternoon  Objective: See treatment diary below      Assessment: Tolerated treatment well  Patient demonstrated fatigue post treatment, exhibited good technique with therapeutic exercises and would benefit from continued PT      Plan: Continue per plan of care           Precautions: DR CALLAHAN 2020        Manuals 21   STM R plantar fascia  AR AR AR AR     PRE R ankle         KT tape arch support, swelling AR AR AR  Leukotape sling performed             Neuro Re-Ed         Towel scrunch 3 x 30s   3 x 30s  3 x 30s    Intrinsic strength 3s x 15 3s x 15 3s x 15      Balance 3x10s on foam 3x10s on foam 3x10s on foam 3x10s on foam 3x10s on foam 3 x 10 on foam             Ther Ex         NuStep warmup 10' L2 5' L3 5' L3 10' L3 5' L3 10 l 1    Gastroc stretch 3x10s 3x10s 3x10s  3x10s 3 x 10s   Ankle 4 way Rev        LAQ 20        Wobble board 30 stand 30 stand 30 stand  30 stand 30 stand    Pro stretch 5 x 10s 10 x 10s 10 x 10s 10 x 10s 10x10s 10 x 10s    Soleus stretch 5 x 10s foam 5 x 10s foam 5 x 10s foam 5 x 10s foam 5x10s 5 x 10s    Standing great toe ext 20 20 20      Seated great toe ext 20 D/C       HR 20, x10 with eccentrics 20, x10 with eccentrics 20 20 20, eccentric 10 20, eccentric 10    Lunge on bosu   10 B                        Modalities         Ice post  Def

## 2021-03-04 NOTE — PROGRESS NOTES
3/4/21 - patient presents following PT session this date  Patient AROM assessed and FOTO performed  ROM below  Goals met  D/C to HEP at this time  Flex - 65  Ext - 71  RD - 15  UD - 30     strength 56 lbs

## 2021-03-09 ENCOUNTER — APPOINTMENT (OUTPATIENT)
Dept: PHYSICAL THERAPY | Facility: CLINIC | Age: 51
End: 2021-03-09
Payer: COMMERCIAL

## 2021-03-15 ENCOUNTER — APPOINTMENT (OUTPATIENT)
Dept: PHYSICAL THERAPY | Facility: CLINIC | Age: 51
End: 2021-03-15
Payer: COMMERCIAL

## 2021-03-17 ENCOUNTER — APPOINTMENT (OUTPATIENT)
Dept: PHYSICAL THERAPY | Facility: CLINIC | Age: 51
End: 2021-03-17
Payer: COMMERCIAL

## 2021-05-05 ENCOUNTER — TELEPHONE (OUTPATIENT)
Dept: OBGYN CLINIC | Facility: CLINIC | Age: 51
End: 2021-05-05

## 2021-05-05 NOTE — TELEPHONE ENCOUNTER
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

## 2021-05-07 ENCOUNTER — OFFICE VISIT (OUTPATIENT)
Dept: OBGYN CLINIC | Facility: CLINIC | Age: 51
End: 2021-05-07
Payer: COMMERCIAL

## 2021-05-07 ENCOUNTER — APPOINTMENT (OUTPATIENT)
Dept: RADIOLOGY | Facility: CLINIC | Age: 51
End: 2021-05-07
Payer: COMMERCIAL

## 2021-05-07 VITALS
DIASTOLIC BLOOD PRESSURE: 80 MMHG | SYSTOLIC BLOOD PRESSURE: 150 MMHG | HEIGHT: 69 IN | WEIGHT: 236.4 LBS | HEART RATE: 78 BPM | BODY MASS INDEX: 35.01 KG/M2

## 2021-05-07 DIAGNOSIS — M21.41 PES PLANUS OF RIGHT FOOT: ICD-10-CM

## 2021-05-07 DIAGNOSIS — M76.821 POSTERIOR TIBIAL TENDON DYSFUNCTION, RIGHT: Primary | ICD-10-CM

## 2021-05-07 DIAGNOSIS — M25.571 RIGHT ANKLE PAIN, UNSPECIFIED CHRONICITY: ICD-10-CM

## 2021-05-07 PROCEDURE — 99214 OFFICE O/P EST MOD 30 MIN: CPT | Performed by: ORTHOPAEDIC SURGERY

## 2021-05-07 PROCEDURE — 73610 X-RAY EXAM OF ANKLE: CPT

## 2021-05-07 NOTE — PROGRESS NOTES
Assessment/Plan:  1  Posterior tibial tendon dysfunction, right  MRI ankle/heel right  wo contrast    Brace   2  Right ankle pain, unspecified chronicity  XR ankle 3+ vw right    MRI ankle/heel right  wo contrast    Brace   3  Pes planus of right foot  Brace     Patient is a very pleasant 59-year-old female here for evaluation today regarding her right ankle pain  After thorough history, clinical exam, and review of her imaging I do feel that she has posterior tibial tendon insufficiency  Currently I do believe she is at stage IIA  We will treat her conservatively with a supportive ankle brace at this time  She has completed approximately 6 weeks of dedicated physical therapy in addition to analgesics medication without significant relief  I have ordered an MRI of her right ankle to evaluate the integrity of the posterior tibial tendon  In the meantime she will continue use of brace and take over-the-counter analgesics/NSAIDs  I will call her with the results of her MRI and further delineate her plan of care from there  All questions addressed today  Subjective:  Right ankle pain    Patient ID: Jeanie Ren is a 48 y o  female  HPI  Patient is a very pleasant 59-year-old female that presents today for evaluation chronic right ankle pain  She denies any recent fall or trauma  She states that at the end of last year she was continuing to have right ankle pain and limitations  She states that she would wake up from rest and her pain in her ankle would be significantly exacerbated when she attempts to weight bear and walk  As she continues to walk the pain only lessens slightly  She reports pain and swelling in her right ankle with prolonged periods of standing and activity  She states that she has always had flat foot issues and did wear orthotics in the past but has not recently  She states by the end of the day her ankle was so swollen that she has to rest it elevated    The pain continues to limit her to carry out age-appropriate ADLs  She has attempted physical therapy for her right ankle pain as recommended previously by another orthopedic surgeon  The physical therapy has not provided significant relief in her ankle  She states that the weakness and pain persists  The swelling also persist with the pain  She denies any paresthesias into the right foot and ankle  Review of Systems   Constitutional: Positive for activity change  HENT: Negative  Eyes: Negative  Respiratory: Negative  Cardiovascular: Negative  Gastrointestinal: Negative  Endocrine: Negative  Musculoskeletal: Positive for arthralgias, gait problem and joint swelling  Skin: Negative  Psychiatric/Behavioral: Negative  Past Medical History:   Diagnosis Date    MS (congenital mitral stenosis)        Past Surgical History:   Procedure Laterality Date    WRIST SURGERY Left 2020       History reviewed  No pertinent family history  Social History     Occupational History    Not on file   Tobacco Use    Smoking status: Never Smoker    Smokeless tobacco: Never Used   Substance and Sexual Activity    Alcohol use: Yes     Comment: Socially    Drug use: Never    Sexual activity: Not on file         Current Outpatient Medications:     fexofenadine (ALLEGRA) 60 MG tablet, Take 60 mg by mouth daily, Disp: , Rfl:     gabapentin (NEURONTIN) 100 mg capsule, Take 100 mg by mouth 2 (two) times a day, Disp: , Rfl:     levothyroxine 50 mcg tablet, Take 50 mcg by mouth daily, Disp: , Rfl:     levothyroxine 50 mcg tablet, Take 50 mcg by mouth daily, Disp: , Rfl:     meloxicam (MOBIC) 15 mg tablet, Take 1 tablet (15 mg total) by mouth daily, Disp: 30 tablet, Rfl: 0    No Known Allergies    Objective:  Vitals:    05/07/21 1122   BP: 150/80   Pulse: 78       Body mass index is 34 91 kg/m²  Right Ankle Exam     Tenderness   Right ankle tenderness location: PTT distally, peroneals distally    Swelling: mild    Range of Motion   Dorsiflexion: normal   Plantar flexion: normal   Eversion: normal   Inversion: normal     Muscle Strength   Dorsiflexion:  5/5  Plantar flexion:  4/5  Anterior tibial:  5/5  Posterior tibial:  3/5  Gastrocsoleus:  5/5  Peroneal muscle:  5/5    Tests   Anterior drawer: negative  Varus tilt: negative    Other   Erythema: absent  Scars: absent  Sensation: normal  Pulse: present     Comments:  Impaired/decreased single foot heel raise  Mild flexible hindfoot valgus  No excessive toes visible  Pes planus  No effusion           Observations     Right Ankle/Foot   Negative for adhesive scar  Strength/Myotome Testing     Right Ankle/Foot   Dorsiflexion: 5  Plantar flexion: 4      Physical Exam  Vitals signs and nursing note reviewed  Constitutional:       Appearance: She is well-developed  HENT:      Head: Atraumatic  Eyes:      General: No scleral icterus  Extraocular Movements: Extraocular movements intact  Conjunctiva/sclera: Conjunctivae normal    Neck:      Musculoskeletal: Neck supple  Cardiovascular:      Rate and Rhythm: Normal rate  Pulmonary:      Effort: Pulmonary effort is normal    Musculoskeletal:      Comments: See orthopedic exam   Skin:     General: Skin is warm and dry  Neurological:      Mental Status: She is alert and oriented to person, place, and time  I have personally reviewed pertinent films in PACS  X-ray of the right ankle obtained here today demonstrates no significant posttraumatic deformity or arthritic deformity  She is demonstrating early pes planus deformity  No lytic or blastic lesion  Ayesha BARRERA    Division of Adult Reconstruction  Department of Orthopaedic Surgery  [facility]

## 2021-05-08 ENCOUNTER — APPOINTMENT (EMERGENCY)
Dept: RADIOLOGY | Facility: HOSPITAL | Age: 51
End: 2021-05-08
Payer: COMMERCIAL

## 2021-05-08 ENCOUNTER — HOSPITAL ENCOUNTER (EMERGENCY)
Facility: HOSPITAL | Age: 51
Discharge: HOME | End: 2021-05-08
Attending: EMERGENCY MEDICINE | Admitting: EMERGENCY MEDICINE
Payer: COMMERCIAL

## 2021-05-08 VITALS
BODY MASS INDEX: 34.85 KG/M2 | WEIGHT: 236 LBS | RESPIRATION RATE: 18 BRPM | SYSTOLIC BLOOD PRESSURE: 156 MMHG | OXYGEN SATURATION: 98 % | DIASTOLIC BLOOD PRESSURE: 72 MMHG | HEART RATE: 74 BPM | TEMPERATURE: 97.8 F

## 2021-05-08 DIAGNOSIS — R51.9 TEMPORAL PAIN: ICD-10-CM

## 2021-05-08 DIAGNOSIS — R51.9 HEADACHE: Primary | ICD-10-CM

## 2021-05-08 DIAGNOSIS — R22.0 RIGHT FACIAL SWELLING: ICD-10-CM

## 2021-05-08 LAB
ANION GAP SERPL CALCULATED.3IONS-SCNC: 11 MMOL/L (ref 4–13)
BASOPHILS # BLD AUTO: 0.05 THOUSANDS/ΜL (ref 0–0.1)
BASOPHILS NFR BLD AUTO: 1 % (ref 0–1)
BUN SERPL-MCNC: 15 MG/DL (ref 5–25)
CALCIUM SERPL-MCNC: 8.6 MG/DL (ref 8.3–10.1)
CHLORIDE SERPL-SCNC: 106 MMOL/L (ref 100–108)
CO2 SERPL-SCNC: 25 MMOL/L (ref 21–32)
CREAT SERPL-MCNC: 0.8 MG/DL (ref 0.6–1.3)
CRP SERPL QL: 10 MG/L
EOSINOPHIL # BLD AUTO: 0.17 THOUSAND/ΜL (ref 0–0.61)
EOSINOPHIL NFR BLD AUTO: 2 % (ref 0–6)
ERYTHROCYTE [DISTWIDTH] IN BLOOD BY AUTOMATED COUNT: 12.7 % (ref 11.6–15.1)
ERYTHROCYTE [SEDIMENTATION RATE] IN BLOOD: 11 MM/HOUR (ref 0–29)
GFR SERPL CREATININE-BSD FRML MDRD: 86 ML/MIN/1.73SQ M
GLUCOSE SERPL-MCNC: 126 MG/DL (ref 65–140)
HCT VFR BLD AUTO: 36.7 % (ref 34.8–46.1)
HGB BLD-MCNC: 12.2 G/DL (ref 11.5–15.4)
IMM GRANULOCYTES # BLD AUTO: 0.03 THOUSAND/UL (ref 0–0.2)
IMM GRANULOCYTES NFR BLD AUTO: 0 % (ref 0–2)
LYMPHOCYTES # BLD AUTO: 2.47 THOUSANDS/ΜL (ref 0.6–4.47)
LYMPHOCYTES NFR BLD AUTO: 31 % (ref 14–44)
MCH RBC QN AUTO: 31.2 PG (ref 26.8–34.3)
MCHC RBC AUTO-ENTMCNC: 33.2 G/DL (ref 31.4–37.4)
MCV RBC AUTO: 94 FL (ref 82–98)
MONOCYTES # BLD AUTO: 0.56 THOUSAND/ΜL (ref 0.17–1.22)
MONOCYTES NFR BLD AUTO: 7 % (ref 4–12)
NEUTROPHILS # BLD AUTO: 4.66 THOUSANDS/ΜL (ref 1.85–7.62)
NEUTS SEG NFR BLD AUTO: 59 % (ref 43–75)
NRBC BLD AUTO-RTO: 0 /100 WBCS
PLATELET # BLD AUTO: 217 THOUSANDS/UL (ref 149–390)
PMV BLD AUTO: 11 FL (ref 8.9–12.7)
POTASSIUM SERPL-SCNC: 4 MMOL/L (ref 3.5–5.3)
RBC # BLD AUTO: 3.91 MILLION/UL (ref 3.81–5.12)
SODIUM SERPL-SCNC: 142 MMOL/L (ref 136–145)
WBC # BLD AUTO: 7.94 THOUSAND/UL (ref 4.31–10.16)

## 2021-05-08 PROCEDURE — 70487 CT MAXILLOFACIAL W/DYE: CPT

## 2021-05-08 PROCEDURE — 99284 EMERGENCY DEPT VISIT MOD MDM: CPT

## 2021-05-08 PROCEDURE — 80048 BASIC METABOLIC PNL TOTAL CA: CPT | Performed by: EMERGENCY MEDICINE

## 2021-05-08 PROCEDURE — 36415 COLL VENOUS BLD VENIPUNCTURE: CPT | Performed by: EMERGENCY MEDICINE

## 2021-05-08 PROCEDURE — 85652 RBC SED RATE AUTOMATED: CPT | Performed by: EMERGENCY MEDICINE

## 2021-05-08 PROCEDURE — 70450 CT HEAD/BRAIN W/O DYE: CPT

## 2021-05-08 PROCEDURE — 99285 EMERGENCY DEPT VISIT HI MDM: CPT | Performed by: EMERGENCY MEDICINE

## 2021-05-08 PROCEDURE — G1004 CDSM NDSC: HCPCS

## 2021-05-08 PROCEDURE — 86140 C-REACTIVE PROTEIN: CPT | Performed by: EMERGENCY MEDICINE

## 2021-05-08 PROCEDURE — 85025 COMPLETE CBC W/AUTO DIFF WBC: CPT | Performed by: EMERGENCY MEDICINE

## 2021-05-08 RX ORDER — ACETAMINOPHEN 325 MG/1
975 TABLET ORAL ONCE
Status: COMPLETED | OUTPATIENT
Start: 2021-05-08 | End: 2021-05-08

## 2021-05-08 RX ORDER — ROSUVASTATIN CALCIUM 10 MG/1
10 TABLET, COATED ORAL
COMMUNITY

## 2021-05-08 RX ADMIN — ACETAMINOPHEN 975 MG: 325 TABLET, FILM COATED ORAL at 18:12

## 2021-05-08 RX ADMIN — IOHEXOL 100 ML: 350 INJECTION, SOLUTION INTRAVENOUS at 19:48

## 2021-05-08 NOTE — ED PROVIDER NOTES
History  Chief Complaint   Patient presents with    Headache     States she has had a headache for several months and has been seen virtually by PCP and is on third round of ABX  States feels that right temporal area is swollen and has pain right side of neck and around ear with pressure and now ringing right ear  HPI    49 yo F hx of MS not on meds for it, presents to ed for eval of headache  Two months ago, patient noticed her right temple started to swell  She states the swelling is ongoing, and spreading around her ear  No erythema  No pain with swallowing  Patient seen only virtually with pcp, and given abx for chronic sinusitis  Was on cefidinir 3 times  No fevers  Sometimes intermittent lack of focus, increased fatigue, and lightheaded  no loss of vision or other neuro symptoms  Constant headache for past 3 months  Dull ache 2 to 3  Improves with tylenol  Pressure sensation  No head trauma  Headaches are not increasing in severity or frequency  No difficulty with speech  Has a consult for ENT, but patient is looking for another ent as second opinion  Has appt with Dr Toni Horn neurology on May 26th  Prior to Admission Medications   Prescriptions Last Dose Informant Patient Reported?  Taking?   fexofenadine (ALLEGRA) 60 MG tablet 5/7/2021 at Unknown time  Yes Yes   Sig: Take 60 mg by mouth daily   gabapentin (NEURONTIN) 100 mg capsule 5/7/2021 at Unknown time  Yes Yes   Sig: Take 100 mg by mouth 2 (two) times a day   levothyroxine 50 mcg tablet 5/8/2021 at Unknown time  Yes Yes   Sig: Take 50 mcg by mouth daily   meloxicam (MOBIC) 15 mg tablet Past Week at Unknown time  No Yes   Sig: Take 1 tablet (15 mg total) by mouth daily   rosuvastatin (CRESTOR) 10 MG tablet   Yes Yes   Sig: Take 10 mg by mouth daily      Facility-Administered Medications: None       Past Medical History:   Diagnosis Date    MS (congenital mitral stenosis)     patient has multiple sclerosis     Multiple sclerosis [facility])        Past Surgical History:   Procedure Laterality Date    HYSTERECTOMY      WRIST SURGERY Left 2020       History reviewed  No pertinent family history  I have reviewed and agree with the history as documented  E-Cigarette/Vaping    E-Cigarette Use Never User      E-Cigarette/Vaping Substances    Nicotine No     THC No     CBD No     Flavoring No     Other No     Unknown No      Social History     Tobacco Use    Smoking status: Never Smoker    Smokeless tobacco: Never Used   Substance Use Topics    Alcohol use: Yes     Frequency: 2-4 times a month     Drinks per session: 1 or 2     Binge frequency: Never    Drug use: Never       Review of Systems   Constitutional: Negative for chills, fatigue and fever  HENT: Negative for sore throat  Eyes: Negative for redness and visual disturbance  Respiratory: Negative for cough and shortness of breath  Cardiovascular: Negative for chest pain  Gastrointestinal: Negative for abdominal pain, diarrhea and nausea  Genitourinary: Negative for difficulty urinating, dysuria and pelvic pain  Musculoskeletal: Negative for back pain  Skin: Negative for rash  swelling   Neurological: Positive for headaches  Negative for syncope and weakness  All other systems reviewed and are negative  Physical Exam  Physical Exam  Vitals signs and nursing note reviewed  Constitutional:       General: She is not in acute distress  HENT:      Head: Normocephalic and atraumatic  Eyes:      Conjunctiva/sclera: Conjunctivae normal    Neck:      Musculoskeletal: Normal range of motion  Cardiovascular:      Rate and Rhythm: Normal rate and regular rhythm  Heart sounds: Normal heart sounds  Pulmonary:      Effort: Pulmonary effort is normal  No respiratory distress  Breath sounds: Normal breath sounds  Abdominal:      General: Bowel sounds are normal       Palpations: Abdomen is soft  Tenderness: There is no abdominal tenderness  Musculoskeletal: Normal range of motion  Skin:     General: Skin is warm and dry  Findings: No rash  Comments: 1+ edema right temple no erythema, minimally tender   Neurological:      Mental Status: She is alert and oriented to person, place, and time  Cranial Nerves: No cranial nerve deficit  Sensory: No sensory deficit  Motor: No abnormal muscle tone  Coordination: Coordination normal       Comments: Mental Status: Alert and oriented to person place time and situation, language fluent with good comprehension and repetition  CN: PERRLA, visual fields full to finger counting bilaterally, extraocular muscles intact without nystagmus  Face symmetrical with full sensation  Hearing in tact to bilateral finger rub  Tongue protrudes midline and palate elevates symmetrically  Sternocleidomastoid and trapezius muscle have full strength bilaterally  Motor: Normal muscle bulk and tone throughout 5/5 strength in upper and lower extremities throughout  No clonus present  Sensory: Sensation intact to light touch     Coordination: Able to perform finger to nose to finger  Gait at baseline           Vital Signs  ED Triage Vitals [05/08/21 1724]   Temperature Pulse Respirations Blood Pressure SpO2   97 8 °F (36 6 °C) 82 18 (!) 199/81 98 %      Temp Source Heart Rate Source Patient Position - Orthostatic VS BP Location FiO2 (%)   Tympanic Monitor Sitting Left arm --      Pain Score       4           Vitals:    05/08/21 1724 05/08/21 1916   BP: (!) 199/81 156/72   Pulse: 82 74   Patient Position - Orthostatic VS: Sitting          Visual Acuity  Visual Acuity      Most Recent Value   L Pupil Size (mm)  3   R Pupil Size (mm)  3          ED Medications  Medications   acetaminophen (TYLENOL) tablet 975 mg (975 mg Oral Given 5/8/21 1812)   iohexol (OMNIPAQUE) 350 MG/ML injection (SINGLE-DOSE) 100 mL (100 mL Intravenous Given 5/8/21 1948)       Diagnostic Studies  Results Reviewed     Procedure Component Value Units Date/Time    Basic metabolic panel [[id number]] Collected: 05/08/21 1830    Lab Status: Final result Specimen: Blood from Arm, Right Updated: 05/08/21 1851     Sodium 142 mmol/L      Potassium 4 0 mmol/L      Chloride 106 mmol/L      CO2 25 mmol/L      ANION GAP 11 mmol/L      BUN 15 mg/dL      Creatinine 0 80 mg/dL      Glucose 126 mg/dL      Calcium 8 6 mg/dL      eGFR 86 ml/min/1 73sq m     Narrative:      [place] guidelines for Chronic Kidney Disease (CKD):     Stage 1 with normal or high GFR (GFR > 90 mL/min/1 73 square meters)    Stage 2 Mild CKD (GFR = 60-89 mL/min/1 73 square meters)    Stage 3A Moderate CKD (GFR = 45-59 mL/min/1 73 square meters)    Stage 3B Moderate CKD (GFR = 30-44 mL/min/1 73 square meters)    Stage 4 Severe CKD (GFR = 15-29 mL/min/1 73 square meters)    Stage 5 End Stage CKD (GFR <15 mL/min/1 73 square meters)  Note: GFR calculation is accurate only with a steady state creatinine    C-reactive protein [[id number]]  (Abnormal) Collected: 05/08/21 1830    Lab Status: Final result Specimen: Blood from Arm, Right Updated: 05/08/21 1851     CRP 10 0 mg/L     Sedimentation rate, automated [[id number]]  (Normal) Collected: 05/08/21 1830    Lab Status: Final result Specimen: Blood from Arm, Right Updated: 05/08/21 1846     Sed Rate 11 mm/hour     CBC and differential [[id number]] Collected: 05/08/21 1830    Lab Status: Final result Specimen: Blood from Arm, Right Updated: 05/08/21 1837     WBC 7 94 Thousand/uL      RBC 3 91 Million/uL      Hemoglobin 12 2 g/dL      Hematocrit 36 7 %      MCV 94 fL      MCH 31 2 pg      MCHC 33 2 g/dL      RDW 12 7 %      MPV 11 0 fL      Platelets 796 Thousands/uL      nRBC 0 /100 WBCs      Neutrophils Relative 59 %      Immat GRANS % 0 %      Lymphocytes Relative 31 %      Monocytes Relative 7 %      Eosinophils Relative 2 %      Basophils Relative 1 %      Neutrophils Absolute 4 66 Thousands/µL      Immature Grans Absolute 0 03 Thousand/uL      Lymphocytes Absolute 2 47 Thousands/µL      Monocytes Absolute 0 56 Thousand/µL      Eosinophils Absolute 0 17 Thousand/µL      Basophils Absolute 0 05 Thousands/µL                  CT facial bones w contrast   Final Result by Ranulfo Torres DO (05/08 2026)      Normal CT of the facial soft tissues  Workstation performed: [id number]         CT head without contrast   Final Result by Ranulfo Torres DO (05/08 2006)      No acute intracranial abnormality  6 mm hyperdense, partially calcified, dural based nodule abutting right anterior interhemispheric falx likely represents a small meningioma  No significant mass effect or edema  Follow-up MRI brain with and without IV contrast recommended in 6 months    to assess stability  Suspected old right parietal periventricular white matter infarct  Study was marked in Epic for follow-up  Workstation performed: [id number]                    Procedures  Procedures         ED Course  ED Course as of May 09 0007   Sat May 08, 2021   2036 Spoke to [address] [place] [address] Neuro, reviewed imaging and exam with neuro, patient is not likely having any symptoms related to her MS  Discharge and follow up  MDM    49 yo F presents with headache and R temple swelling  Patient has ongoing symptoms for months  Will get inflammatory markers, concern for giant cell arteritis  Will also get imaging, concern for mastoiditis given ongoing ear pain, and multiple rounds of abx  Imaging shows no acute findings  Pt informed of old findings, and will follow up for MRI with neuro this month  Patient's case discussed with neurology see ed course  No further recs at this time  At this time stable for follow up with neuro and ent  The patient was instructed to follow up as documented   Strict return precautions were discussed with the patient and the patient was instructed to return to the emergency department immediately if symptoms worsen  The patient/patient family member acknowledged and were in agreement with plan  Disposition  Final diagnoses:   Headache   Right facial swelling   Temporal pain     Time reflects when diagnosis was documented in both MDM as applicable and the Disposition within this note     Time User Action Codes Description Comment    5/8/2021  8:39 PM Walietta Copier Add [R51 9] Headache     5/8/2021  8:39 PM Mosetta Copier Add [R22 0] Right facial swelling     5/8/2021  8:39 PM Mosetta Copier Add [R51 9] Temporal pain       ED Disposition     ED Disposition Condition Date/Time Comment    Discharge Stable Sat May 8, 2021  8:37 PM Diaz Misty discharge to home/self care              Follow-up Information     Follow up With Specialties Details Why Contact Info Additional Information    Selam Urrutia,  Family Medicine Schedule an appointment as soon as possible for a visit in 2 days For follow up regarding your symptoms and recheck [address]       Tylor Saxena MD Otolaryngology Schedule an appointment as soon as possible for a visit in 1 day For follow up regarding your symptoms and recheck [facility], [address] [facility] [address] Neurology Schedule an appointment as soon as possible for a visit in 1 day For follow up regarding your symptoms and recheck [phone]  [address] [place] [address], [address], [place], [address] [facility], [address]          Discharge Medication List as of 5/8/2021  8:40 PM      CONTINUE these medications which have NOT CHANGED    Details   fexofenadine (ALLEGRA) 60 MG tablet Take 60 mg by mouth daily, Historical Med      gabapentin (NEURONTIN) 100 mg capsule Take 100 mg by mouth 2 (two) times a day, Historical Med      levothyroxine 50 mcg tablet Take 50 mcg by mouth daily, Historical Med      meloxicam (MOBIC) 15 mg tablet Take 1 tablet (15 mg total) by mouth daily, Starting u 1/14/2021, Normal      rosuvastatin (CRESTOR) 10 MG tablet Take 10 mg by mouth daily, Historical Med           No discharge procedures on file      PDMP Review     None          ED Provider  Electronically Signed by           Hima Carrasco MD  05/09/21 9522

## 2021-05-09 NOTE — DISCHARGE INSTRUCTIONS
Please review your CT scan with your PCP and neurologist:  CT brain:  6 mm hyperdense, partially calcified, dural based nodule abutting right anterior interhemispheric falx likely represents a small meningioma  No significant mass effect or edema  Follow-up MRI brain with and without IV contrast recommended in 6 months to assess stability  Suspected old right parietal periventricular white matter infarct

## 2021-05-26 ENCOUNTER — CONSULT (OUTPATIENT)
Dept: NEUROLOGY | Facility: CLINIC | Age: 51
End: 2021-05-26
Payer: COMMERCIAL

## 2021-05-26 VITALS
BODY MASS INDEX: 34.66 KG/M2 | HEIGHT: 69 IN | DIASTOLIC BLOOD PRESSURE: 76 MMHG | HEART RATE: 77 BPM | WEIGHT: 234 LBS | SYSTOLIC BLOOD PRESSURE: 124 MMHG

## 2021-05-26 DIAGNOSIS — R52 CHRONIC GENERALIZED PAIN: ICD-10-CM

## 2021-05-26 DIAGNOSIS — M76.821 TIBIALIS POSTERIOR TENDINITIS, RIGHT: ICD-10-CM

## 2021-05-26 DIAGNOSIS — G35 MULTIPLE SCLEROSIS (HCC): Primary | ICD-10-CM

## 2021-05-26 DIAGNOSIS — R53.82 CHRONIC FATIGUE: ICD-10-CM

## 2021-05-26 DIAGNOSIS — D32.9 MENINGIOMA (HCC): ICD-10-CM

## 2021-05-26 DIAGNOSIS — G89.29 CHRONIC GENERALIZED PAIN: ICD-10-CM

## 2021-05-26 PROCEDURE — 99205 OFFICE O/P NEW HI 60 MIN: CPT | Performed by: PSYCHIATRY & NEUROLOGY

## 2021-05-26 RX ORDER — MONTELUKAST SODIUM 10 MG/1
10 TABLET ORAL
COMMUNITY
Start: 2021-05-17

## 2021-05-26 RX ORDER — MELOXICAM 15 MG/1
15 TABLET ORAL DAILY
Qty: 30 TABLET | Refills: 2 | Status: SHIPPED | OUTPATIENT
Start: 2021-05-26 | End: 2021-06-28

## 2021-05-26 NOTE — PROGRESS NOTES
Outpatient Neurology History and Physical  Diaz Jay  [id number]  89 y o   1970          Consult: Yes    Selam Urrutia DO      Chief Complaint   Patient presents with    Multiple Sclerosis     diagnosed in 2004           History Obtained from: patient and      HPI:     Diaz Jay is a 49 yo F with PMH of MS presents to establish care for MS  She was diagnosed in 2003  Patient took copaxone for 2 years  She was getting bruises at each site and eventually tried natural, herbal therapy  She was noticing forgetfulness, word finding difficulty  She was thought to have Lyme and still wasn't getting better after abx so imaging finally showed demyelinating lesions  She had LP done to confirm it  Stress, physical exhaustion can be triggers  She can get fatigue easily  She avoids uneven surfaces  She used to get paresthesia but hasn't in a long time  Stress can make her eyes fatigued  Her most recent MRI brain was a year ago  Lesions were stable  There was no worsening per her prior neurologist    She was on provigil for fatigue  She's on neurontin 100mg bid  She takes mobic as needed  She had ct brain in May which had revealed 6mm hyperdense partially calcified dural based nodule in right anterior interhemispheric falx, small meningioma           Past Medical History:   Diagnosis Date    MS (congenital mitral stenosis)     patient has multiple sclerosis     Multiple sclerosis ([facility] [address] )                Current Outpatient Medications on File Prior to Visit   Medication Sig Dispense Refill    fexofenadine (ALLEGRA) 60 MG tablet Take 60 mg by mouth daily      gabapentin (NEURONTIN) 100 mg capsule Take 100 mg by mouth 2 (two) times a day      levothyroxine 50 mcg tablet Take 50 mcg by mouth daily      montelukast (SINGULAIR) 10 mg tablet Take 10 mg by mouth daily      rosuvastatin (CRESTOR) 10 MG tablet Take 10 mg by mouth daily      [DISCONTINUED] meloxicam (MOBIC) 15 mg tablet Take 1 tablet (15 mg total) by mouth daily 30 tablet 0     No current facility-administered medications on file prior to visit  No Known Allergies      No family history on file               Past Surgical History:   Procedure Laterality Date    HYSTERECTOMY      WRIST SURGERY Left 2020           Social History     Socioeconomic History    Marital status:      Spouse name: Not on file    Number of children: Not on file    Years of education: Not on file    Highest education level: Not on file   Occupational History    Not on file   Social Needs    Financial resource strain: Not on file    Food insecurity     Worry: Not on file     Inability: Not on file    Transportation needs     Medical: Not on file     Non-medical: Not on file   Tobacco Use    Smoking status: Never Smoker    Smokeless tobacco: Never Used   Substance and Sexual Activity    Alcohol use: Yes     Frequency: 2-4 times a month     Drinks per session: 1 or 2     Binge frequency: Never    Drug use: Never    Sexual activity: Not on file   Lifestyle    Physical activity     Days per week: Not on file     Minutes per session: Not on file    Stress: Not on file   Relationships    Social connections     Talks on phone: Not on file     Gets together: Not on file     Attends Ten[employer] service: Not on file     Active member of club or organization: Not on file     Attends meetings of clubs or organizations: Not on file     Relationship status: Not on file    Intimate partner violence     Fear of current or ex partner: Not on file     Emotionally abused: Not on file     Physically abused: Not on file     Forced sexual activity: Not on file   Other Topics Concern    Not on file   Social History Narrative    Not on file       Review of Systems  Refer to positive review of systems in HPI  Constitutional- No fever  Eyes- No visual change  ENT- Hearing normal  CV- No chest pain  Resp- No Shortness of breath  GI- No diarrhea  - Bladder normal  MS- No Arthritis   Skin- No rash  Psych- No depression  Endo- No DM  Heme- No nodes    PHYSICAL EXAM:    Vitals:    05/26/21 1419   BP: 124/76   BP Location: Left arm   Patient Position: Sitting   Cuff Size: Adult   Pulse: 77   Weight: 106 kg (234 lb)   Height: 5' 9" (1 753 m)         Appearance: No Acute Distress  Ophthalmoscopic: Disc Flat, Normal fundus  Carotid/Heart/Peripheral Vascular: No Bruits, RRR  Orientation: Awake, Alert, and Oriented x 3  Mental status:  Memory: Registation 3/3 Recall 3/3  Attention: Normal  Knowledge: Appropriate  Language: No aphasia  Speech: No dysarthria  Cranial Nerves:  2 No Visual Defect on Confrontation; Pupils round, equal, reactive to light  3,4,6 Extraocular Movements Intact; no nystagmus  5 Facial Sensation Intact  7 No facial asymmetry  8 Intact hearing  9,10 Palate symmetric, normal gag  11 Good shoulder shrug  12 Tongue Midline  Gait: Stable, No ataxia, can perform tandem walking  Coordination: No ataxia with finger to nose testing and heel to shin testing  Sensory: slightly decreased in LUE  Intact Vibration, and Joint Position  Muscle Tone: Normal  Muscle exam  Arm Right Left Leg Right Left   Deltoid 4++/5 5/5 Iliopsoas 5/5 5/5   Biceps 5/5 5/5 Quads 5/5 5/5   Triceps 5/5 5/5 Hamstrings 5/5 5/5   Wrist Extension 5/5 5/5 Ankle Dorsi Flexion 5/5 5/5   Wrist Flexion 5/5 5/5 Ankle Plantar Flexion 5/5 5/5   Interossei 5/5 5/5 Ankle Eversion 5/5 5/5   APB 5/5 5/5 Ankle Inversion 5/5 5/5       Reflexes   RJ BJ TJ KJ AJ Plantars Guerra's   Right 2+ 2+ 2+ 2+ 2+ Downgoing Not present   Left 2+ 2+ 2+ 2+ 2+ Downgoing Not present         Personal review of             Assessment/Plan:     1  Multiple sclerosis (HCC)  Vitamin B12    Vitamin D 25 hydroxy    Vitamin B12    Vitamin D 25 hydroxy   2  Chronic fatigue     3  Meningioma ([facility] [address] )     4  Tibialis posterior tendinitis, right     5   Chronic generalized pain  meloxicam (MOBIC) 15 mg tablet         Patient's exam is quite good with no significant deficit noted from MS  Discussed that since we are not changing management by obtaining MRIs every year, should consider reducing it to every 2-3 years  She is not interested in DMD which is ok considering her history  Will check vit B12, vit D to make sure they are wnl  Encourage physical and cognitive exercises  Counseling Documentation:  The patient and/or patient's family were  counseled regarding diagnostic results  Instructions for management,risk factor reductions,prognosis of disease were discussed  Patient and family were educated regarding impressions,risks and benefits of treatment options,importance of compliance with treatment  Total time of encounter: 60 min  More than 50% of time was spent in counseling and coordination of care of patient  GIGI Guillory [facility]  [address]  [address] [place] [address]

## 2021-06-04 ENCOUNTER — HOSPITAL ENCOUNTER (OUTPATIENT)
Dept: RADIOLOGY | Facility: HOSPITAL | Age: 51
Discharge: HOME | End: 2021-06-04
Attending: ORTHOPAEDIC SURGERY
Payer: COMMERCIAL

## 2021-06-04 DIAGNOSIS — M25.571 RIGHT ANKLE PAIN, UNSPECIFIED CHRONICITY: ICD-10-CM

## 2021-06-04 DIAGNOSIS — M76.821 POSTERIOR TIBIAL TENDON DYSFUNCTION, RIGHT: ICD-10-CM

## 2021-06-04 PROCEDURE — 73721 MRI JNT OF LWR EXTRE W/O DYE: CPT

## 2021-06-04 PROCEDURE — G1004 CDSM NDSC: HCPCS

## 2021-06-11 ENCOUNTER — TELEPHONE (OUTPATIENT)
Dept: OBGYN CLINIC | Facility: CLINIC | Age: 51
End: 2021-06-11

## 2021-06-11 NOTE — TELEPHONE ENCOUNTER
Advised of results and recommendations  Patient will work on getting an appointment as soon as possible and call the office to let me know when this is so we can have her CD and report ready for her

## 2021-06-11 NOTE — TELEPHONE ENCOUNTER
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

## 2021-06-27 DIAGNOSIS — R52 CHRONIC GENERALIZED PAIN: ICD-10-CM

## 2021-06-27 DIAGNOSIS — G89.29 CHRONIC GENERALIZED PAIN: ICD-10-CM

## 2021-06-28 RX ORDER — MELOXICAM 15 MG/1
TABLET ORAL
Qty: 30 TABLET | Refills: 1 | Status: SHIPPED | OUTPATIENT
Start: 2021-06-28 | End: 2021-09-01

## 2021-07-12 ENCOUNTER — OFFICE VISIT (OUTPATIENT)
Dept: OBGYN CLINIC | Facility: CLINIC | Age: 51
End: 2021-07-12
Payer: COMMERCIAL

## 2021-07-12 VITALS
WEIGHT: 234 LBS | SYSTOLIC BLOOD PRESSURE: 136 MMHG | HEIGHT: 69 IN | DIASTOLIC BLOOD PRESSURE: 86 MMHG | BODY MASS INDEX: 34.66 KG/M2 | HEART RATE: 70 BPM

## 2021-07-12 DIAGNOSIS — G56.03 CARPAL TUNNEL SYNDROME, BILATERAL: ICD-10-CM

## 2021-07-12 DIAGNOSIS — M18.0 ARTHRITIS OF CARPOMETACARPAL (CMC) JOINT OF BOTH THUMBS: Primary | ICD-10-CM

## 2021-07-12 DIAGNOSIS — Z98.890 S/P ORIF (OPEN REDUCTION INTERNAL FIXATION) FRACTURE: ICD-10-CM

## 2021-07-12 DIAGNOSIS — Z87.81 S/P ORIF (OPEN REDUCTION INTERNAL FIXATION) FRACTURE: ICD-10-CM

## 2021-07-12 DIAGNOSIS — M25.532 PAIN IN LEFT WRIST: ICD-10-CM

## 2021-07-12 PROCEDURE — 99214 OFFICE O/P EST MOD 30 MIN: CPT | Performed by: ORTHOPAEDIC SURGERY

## 2021-07-12 PROCEDURE — 20600 DRAIN/INJ JOINT/BURSA W/O US: CPT | Performed by: ORTHOPAEDIC SURGERY

## 2021-07-12 RX ORDER — LIDOCAINE HYDROCHLORIDE 10 MG/ML
0.5 INJECTION, SOLUTION INFILTRATION; PERINEURAL
Status: COMPLETED | OUTPATIENT
Start: 2021-07-12 | End: 2021-07-12

## 2021-07-12 RX ORDER — TRIAMCINOLONE ACETONIDE 40 MG/ML
20 INJECTION, SUSPENSION INTRA-ARTICULAR; INTRAMUSCULAR
Status: COMPLETED | OUTPATIENT
Start: 2021-07-12 | End: 2021-07-12

## 2021-07-12 RX ADMIN — LIDOCAINE HYDROCHLORIDE 0.5 ML: 10 INJECTION, SOLUTION INFILTRATION; PERINEURAL at 16:25

## 2021-07-12 RX ADMIN — TRIAMCINOLONE ACETONIDE 20 MG: 40 INJECTION, SUSPENSION INTRA-ARTICULAR; INTRAMUSCULAR at 16:25

## 2021-07-12 NOTE — PROGRESS NOTES
Assessment/Plan:  1  Arthritis of carpometacarpal (CMC) joint of both thumbs  Small joint arthrocentesis: L thumb CMC   2  Carpal tunnel syndrome, bilateral  Cock Up Wrist Splint   3  S/P ORIF (open reduction internal fixation) fracture  US MSK limited   4  Pain in left wrist  US MSK limited       Scribe Attestation    I,:  Keri Henriquez MA am acting as a scribe while in the presence of the attending physician :       I,:  Elinor Serrano DO personally performed the services described in this documentation    as scribed in my presence :           48year old female with bilateral thumb CMC arthritis, bilateral carpal tunnel syndrome, and left wrist pain status post ORIF left distal radius fracture  Patient and I discussed in regards to her bilateral thumb CMC arthritis that is reasonable to perform repeat corticosteroid injections  However due to the laceration she has on her right thumb would not be able to give her a right thumb CMC injection today  We did provide her with the left thumb CMC injection, this was tolerated well  We discussed when the laceration is well healed to her right thumb I would be able to perform injection for that side as well  She continue to use her Comfort Cool braces in the interim  Patient will schedule follow-up in about 2 weeks for this injection  In regards to her bilateral hand numbness/tingling I recommended use of bilateral wrist splints at night  We discussed her numbness and tingling is likely have her bilateral tunnel syndrome  I will provide her with corticosteroid injections to the bilateral carpal tunnels at the next visit in addition to her right thumb CMC injection  In regards to her left wrist pain, patient and I discussed the tendons could be rubbing on the plate that she has at her distal radius  We will obtain ultrasound to further evaluate this    If ultrasound is ready for review at the time of her next visit we will discuss this otherwise we will discuss this in the future  I will see her back in 2 weeks time for her injections  Subjective:   Pratima Brown is a 48 y o  female who presents to the office today for a follow-up evaluation of her bilateral thumb CMC arthritis x-rays patient received corticosteroid to the right thumb CMC on 1/15/21  She received a left thumb CMC injection on 02/26/2021  Patient states she has had significant relief on her right thumb until the last month  She states she was doing a lot of pinching and gripping over the last month  She notes mild soreness about the left thumb as well  She does use her Comfort Cool braces occasionally but admits to not knowing when to wear them  she notes no new injuries  She notes no numbness or tingling  She does take occasional over-the-counter pain medication for the pain in her thumbs  Patient also reports she has been having bilateral hand numbness tingling for several years now  This did improve after her left ORIF with partial carpal tunnel release  She does continue to note numbness and tingling in the median nerve distribution which seems to be worse with increased activities  Patient also notes she has been having some left wrist pain recently attributes this to her history of left distal radius ORIF  Review of Systems   Constitutional: Negative for chills, fever and unexpected weight change  HENT: Negative for hearing loss, nosebleeds and sore throat  Eyes: Negative for pain, redness and visual disturbance  Respiratory: Negative for cough, shortness of breath and wheezing  Cardiovascular: Negative for chest pain, palpitations and leg swelling  Gastrointestinal: Negative for abdominal pain, nausea and vomiting  Endocrine: Negative for polydipsia and polyuria  Genitourinary: Negative for dyspareunia and hematuria  Musculoskeletal: Positive for arthralgias  Negative for joint swelling and myalgias  Skin: Negative for rash and wound     Neurological: Negative for dizziness, numbness and headaches  Psychiatric/Behavioral: Negative for decreased concentration and suicidal ideas  The patient is not nervous/anxious  Past Medical History:   Diagnosis Date    MS (congenital mitral stenosis)     patient has multiple sclerosis     Multiple sclerosis ([facility] [address] )        Past Surgical History:   Procedure Laterality Date    HYSTERECTOMY      WRIST SURGERY Left 2020       History reviewed  No pertinent family history  Social History     Occupational History    Not on file   Tobacco Use    Smoking status: Never Smoker    Smokeless tobacco: Never Used   Vaping Use    Vaping Use: Never used   Substance and Sexual Activity    Alcohol use:  Yes    Drug use: Never    Sexual activity: Not on file         Current Outpatient Medications:     fexofenadine (ALLEGRA) 60 MG tablet, Take 60 mg by mouth daily, Disp: , Rfl:     gabapentin (NEURONTIN) 100 mg capsule, Take 100 mg by mouth 2 (two) times a day, Disp: , Rfl:     levothyroxine 50 mcg tablet, Take 50 mcg by mouth daily, Disp: , Rfl:     meloxicam (MOBIC) 15 mg tablet, TAKE 1 TABLET BY MOUTH ONCE A DAY, Disp: 30 tablet, Rfl: 1    montelukast (SINGULAIR) 10 mg tablet, Take 10 mg by mouth daily, Disp: , Rfl:     rosuvastatin (CRESTOR) 10 MG tablet, Take 10 mg by mouth daily, Disp: , Rfl:     No Known Allergies    Objective:  Vitals:    07/12/21 1541   BP: 136/86   Pulse: 70       Ortho Exam   Right thumb   Laceration at the the thumb dorsal MCP  No erythema or ecchymosis noted  Tender to palpate thumb CMC  Positive thumb adduction test    Positive Tinel's carpal tunnel  Sensation intact to the radial and ulnar aspect of the digit   Brisk capillary refill noted     left wrist   Incision is well healed at the volar wrist from previous ORIF   Crepitus noted with with thumb and digit flexion   tender to palpate thumb CMC  Positive thumb adduction test   Sensation intact  Brisk capillary refill at all digits    Physical Exam  Vitals reviewed  Constitutional:       Appearance: She is well-developed  HENT:      Head: Normocephalic and atraumatic  Eyes:      General: No scleral icterus  Conjunctiva/sclera: Conjunctivae normal    Cardiovascular:      Rate and Rhythm: Normal rate  Pulses: Normal pulses  Pulmonary:      Effort: Pulmonary effort is normal  No respiratory distress  Musculoskeletal:      Cervical back: Normal range of motion and neck supple  Comments: As noted in HPI   Skin:     General: Skin is warm and dry  Neurological:      Mental Status: She is alert and oriented to person, place, and time  Psychiatric:         Behavior: Behavior normal            I have personally reviewed pertinent films in PACS and my interpretation is as follows:  None today       Small joint arthrocentesis: L thumb CMC  [place] Protocol:  Consent: Verbal consent obtained    Consent given by: patient  Patient identity confirmed: verbally with patient    Supporting Documentation  Indications: pain   Procedure Details  Location: thumb - L thumb CMC  Preparation: Patient was prepped and draped in the usual sterile fashion  Needle size: 27 G  Ultrasound guidance: no  Medications administered: 20 mg triamcinolone acetonide 40 mg/mL; 0 5 mL lidocaine 1 %    Patient tolerance: patient tolerated the procedure well with no immediate complications  Dressing:  Sterile dressing applied

## 2021-07-14 ENCOUNTER — OFFICE VISIT (OUTPATIENT)
Dept: AUDIOLOGY | Facility: CLINIC | Age: 51
End: 2021-07-14
Payer: COMMERCIAL

## 2021-07-14 ENCOUNTER — OFFICE VISIT (OUTPATIENT)
Dept: OTOLARYNGOLOGY | Facility: CLINIC | Age: 51
End: 2021-07-14
Payer: COMMERCIAL

## 2021-07-14 VITALS — TEMPERATURE: 97 F | HEIGHT: 69 IN | BODY MASS INDEX: 32.58 KG/M2 | WEIGHT: 220 LBS

## 2021-07-14 DIAGNOSIS — R51.9 FACIAL PAIN: ICD-10-CM

## 2021-07-14 DIAGNOSIS — M26.609 TMJ (TEMPOROMANDIBULAR JOINT SYNDROME): ICD-10-CM

## 2021-07-14 DIAGNOSIS — M54.2 NECK PAIN: ICD-10-CM

## 2021-07-14 DIAGNOSIS — H93.11 RIGHT-SIDED TINNITUS: Primary | ICD-10-CM

## 2021-07-14 DIAGNOSIS — H90.3 SENSORY HEARING LOSS, BILATERAL: Primary | ICD-10-CM

## 2021-07-14 DIAGNOSIS — H92.01 OTALGIA, RIGHT: ICD-10-CM

## 2021-07-14 PROCEDURE — 92567 TYMPANOMETRY: CPT | Performed by: AUDIOLOGIST

## 2021-07-14 PROCEDURE — 92557 COMPREHENSIVE HEARING TEST: CPT | Performed by: AUDIOLOGIST

## 2021-07-14 PROCEDURE — 99244 OFF/OP CNSLTJ NEW/EST MOD 40: CPT | Performed by: NURSE PRACTITIONER

## 2021-07-14 RX ORDER — PHENAZOPYRIDINE HYDROCHLORIDE 200 MG/1
TABLET, FILM COATED ORAL
COMMUNITY
Start: 2021-06-10 | End: 2021-10-15

## 2021-07-14 RX ORDER — ASPIRIN 81 MG/1
TABLET, CHEWABLE ORAL
COMMUNITY
End: 2021-10-20 | Stop reason: HOSPADM

## 2021-07-14 RX ORDER — PREDNISONE 10 MG/1
TABLET ORAL
Qty: 30 TABLET | Refills: 0 | Status: SHIPPED | OUTPATIENT
Start: 2021-07-14 | End: 2021-10-15

## 2021-07-14 RX ORDER — PROPRANOLOL/HYDROCHLOROTHIAZID 40 MG-25MG
TABLET ORAL
COMMUNITY
End: 2021-08-11 | Stop reason: ALTCHOICE

## 2021-07-14 RX ORDER — NITROFURANTOIN 25; 75 MG/1; MG/1
CAPSULE ORAL
COMMUNITY
Start: 2021-06-10 | End: 2021-10-15

## 2021-07-14 RX ORDER — OMEPRAZOLE 40 MG/1
CAPSULE, DELAYED RELEASE ORAL
COMMUNITY
Start: 2021-06-28

## 2021-07-14 RX ORDER — CEFDINIR 300 MG/1
CAPSULE ORAL
COMMUNITY
Start: 2021-05-06 | End: 2021-10-15

## 2021-07-14 RX ORDER — FLUTICASONE PROPIONATE 50 MCG
50 SPRAY, SUSPENSION (ML) NASAL DAILY
COMMUNITY
Start: 2021-06-24 | End: 2022-06-30

## 2021-07-14 RX ORDER — DIAZEPAM 5 MG/1
1 TABLET ORAL EVERY 12 HOURS PRN
Status: ON HOLD | COMMUNITY
End: 2021-09-17 | Stop reason: ALTCHOICE

## 2021-07-14 RX ORDER — CYCLOBENZAPRINE HCL 10 MG
10 TABLET ORAL
COMMUNITY
Start: 2021-05-18

## 2021-07-14 RX ORDER — AZELASTINE 1 MG/ML
2 SPRAY, METERED NASAL DAILY
COMMUNITY
Start: 2021-05-17

## 2021-07-14 NOTE — PROGRESS NOTES
ENT HEARING EVALUATION    Name:  Erin Nur  :  1970  Age:  48 y o  Date of Evaluation: 21     History: ENT Audiogram  Reason for visit: Erin Nur is being seen today at the request of LARON Reaves for an evaluation of hearing as part of their initial ENT visit  EVALUATION:    Otoscopic Evaluation:   Right Ear: Clear and healthy ear canal and tympanic membrane   Left Ear: Clear and healthy ear canal and tympanic membrane    Tympanometry:   Right: Type A - normal middle ear pressure and compliance   Left: Type A - normal middle ear pressure and compliance    Audiogram Results:  Borderline normal hearing in both ears  SRT and PTA are in agreement indicating excellent test reliability  Word recognition is excellent in both ears  *see attached audiogram      RECOMMENDATIONS:  Consult ENT  Return for audiologic re-evaluation in conjunction with ENT follow-up or sooner if concerns about hearing arise  Renny Solorio   CCC-A  Clinical Audiologist  #[id number]

## 2021-07-14 NOTE — PROGRESS NOTES
Assessment/Plan:    TMJ (temporomandibular joint syndrome)  Reviewed recurrent otalgia and possible causes  Discussed ETD, dental changes, vs ear processes  Noted crepitus of right TM joint with increased muscle tension  No parotid swelling, no changes of TM or outer ear  Audiogram today indicating normal/borderline bilateral hearing, tymps type A  Discussed TMJ syndrome in detail  Reviewed options for treatment including soft diet, jaw rest, NSAIDs, warm compresses, oral steroids, massage, PT, oral appliance, or consultation with oral surgeon for possible Botox  After discussion agreed to PT evaluation and oral steroids  Time spent with pt and  in discussion greater than 45 minutes               Diagnoses and all orders for this visit:    Right-sided tinnitus  -     Ambulatory referral to Audiology  -     Ambulatory referral to Physical Therapy; Future  -     predniSONE 10 mg tablet; 4 tabs for 3 days then 3 tabs for 3 days then 2 tabs for 3 days then one tab for 3 days    Otalgia, right  -     Ambulatory referral to Audiology  -     Ambulatory referral to Physical Therapy; Future  -     predniSONE 10 mg tablet; 4 tabs for 3 days then 3 tabs for 3 days then 2 tabs for 3 days then one tab for 3 days    TMJ (temporomandibular joint syndrome)  -     Ambulatory referral to Physical Therapy; Future  -     predniSONE 10 mg tablet; 4 tabs for 3 days then 3 tabs for 3 days then 2 tabs for 3 days then one tab for 3 days    Neck pain  -     Ambulatory referral to Physical Therapy; Future  -     predniSONE 10 mg tablet; 4 tabs for 3 days then 3 tabs for 3 days then 2 tabs for 3 days then one tab for 3 days    Facial pain  -     Ambulatory referral to Physical Therapy;  Future  -     predniSONE 10 mg tablet; 4 tabs for 3 days then 3 tabs for 3 days then 2 tabs for 3 days then one tab for 3 days    Other orders  -     azelastine (ASTELIN) 0 1 % nasal spray; 2 sprays into each nostril daily  -     cefdinir (OMNICEF) 300 mg capsule; take 1 capsule by mouth twice a day for 10 days (Patient not taking: Reported on 7/14/2021)  -     cyclobenzaprine (FLEXERIL) 10 mg tablet; Take 10 mg by mouth  -     fluticasone (FLONASE) 50 mcg/act nasal spray; 50 mcg into each nostril daily  -     nitrofurantoin (MACROBID) 100 mg capsule; take 1 capsule by mouth every 12 hours for 7 days (Patient not taking: Reported on 7/14/2021)  -     omeprazole (PriLOSEC) 40 MG capsule; TAKE 1 CAPSULE (40 MG) BY MOUTH DAILY BEFORE BREAKFAST  -     phenazopyridine (PYRIDIUM) 200 mg tablet; take 1 tablet by mouth three times a day with meals for 3 days (Patient not taking: Reported on 7/14/2021)  -     diazepam (Valium) 5 mg tablet; Take 1 tablet by mouth every 12 (twelve) hours as needed (Patient not taking: Reported on 7/14/2021)  -     Turmeric 500 MG CAPS;  (Patient not taking: Reported on 7/14/2021)  -     HYDROCODONE-APAP-DIETARY PROD PO; Every 6 hours (Patient not taking: Reported on 7/14/2021)  -     aspirin 81 mg chewable tablet          Subjective:      Patient ID: Nallely Mayers is a 48 y o  female  Presents today as a new patient due to right ear discomfort  Occurring for past few months  Facial swelling extending to temple right ear  Ringing in right ear  Feels pressure behind the sinus  This persists even after recent surgery  Surgery last August for deviated septum, Dr Nany Rodriguez in [facility]  History sinus infections  Taking antihistamines and Flonase with Astelin  History allergies  Recurrent sore throat over past year  Treated with multiple round of antibiotics  Also notes saliva salty at times, or different taste  Difficulty eating spicy food, burns throat  Following Dr Faith Reyes for history MS          The following portions of the patient's history were reviewed and updated as appropriate: allergies, current medications, past family history, past medical history, past social history, past surgical history and problem list     Review of Systems   Constitutional: Negative  HENT: Positive for ear pain  Negative for congestion, ear discharge, hearing loss, nosebleeds, postnasal drip, rhinorrhea, sinus pressure, sinus pain, sore throat, tinnitus and voice change  Eyes: Negative  Respiratory: Negative for chest tightness and shortness of breath  Cardiovascular: Negative  Gastrointestinal: Negative  Endocrine: Negative  Musculoskeletal: Positive for neck pain  Skin: Negative for color change  Neurological: Negative for dizziness, numbness and headaches  Psychiatric/Behavioral: Negative  Objective:      Temp (!) 97 °F (36 1 °C) (Temporal)   Ht 5' 9" (1 753 m)   Wt 99 8 kg (220 lb)   BMI 32 49 kg/m²          Physical Exam  Constitutional:       Appearance: She is well-developed  HENT:      Head: Normocephalic  Jaw: Tenderness, swelling, pain on movement and malocclusion present  Right Ear: Hearing, tympanic membrane, ear canal and external ear normal  No decreased hearing noted  No drainage or tenderness  Tympanic membrane is not perforated or erythematous  Left Ear: Hearing, tympanic membrane, ear canal and external ear normal  No decreased hearing noted  No drainage or tenderness  Tympanic membrane is not perforated or erythematous  Nose: Nose normal  No nasal deformity or septal deviation  Mouth/Throat:      Mouth: Mucous membranes are not pale and not dry  No oral lesions  Dentition: Normal dentition  Pharynx: Uvula midline  No oropharyngeal exudate  Neck:      Trachea: No tracheal deviation  Cardiovascular:      Rate and Rhythm: Normal rate  Pulmonary:      Effort: Pulmonary effort is normal  No accessory muscle usage or respiratory distress  Musculoskeletal:      Right shoulder: Normal range of motion  Cervical back: Full passive range of motion without pain, normal range of motion and neck supple     Lymphadenopathy:      Cervical: No cervical adenopathy  Skin:     General: Skin is warm and dry  Neurological:      Mental Status: She is alert and oriented to person, place, and time  Cranial Nerves: No cranial nerve deficit  Sensory: No sensory deficit  Psychiatric:         Behavior: Behavior is cooperative

## 2021-07-19 ENCOUNTER — EVALUATION (OUTPATIENT)
Dept: PHYSICAL THERAPY | Facility: CLINIC | Age: 51
End: 2021-07-19
Payer: COMMERCIAL

## 2021-07-19 DIAGNOSIS — H92.01 OTALGIA, RIGHT: ICD-10-CM

## 2021-07-19 DIAGNOSIS — R51.9 FACIAL PAIN: ICD-10-CM

## 2021-07-19 DIAGNOSIS — M54.2 NECK PAIN: ICD-10-CM

## 2021-07-19 DIAGNOSIS — M26.609 TMJ (TEMPOROMANDIBULAR JOINT SYNDROME): Primary | ICD-10-CM

## 2021-07-19 DIAGNOSIS — H93.11 RIGHT-SIDED TINNITUS: ICD-10-CM

## 2021-07-19 PROCEDURE — 97161 PT EVAL LOW COMPLEX 20 MIN: CPT | Performed by: PHYSICAL THERAPIST

## 2021-07-19 NOTE — PROGRESS NOTES
PT Evaluation     Today's date: 2021  Patient name: Mich Birch  : 1970  MRN: [id number]  Referring provider: LARON Wood  Dx:   Encounter Diagnosis     ICD-10-CM    1  TMJ (temporomandibular joint syndrome)  M26 609    2  Otalgia, right  H92 01    3  Right-sided tinnitus  H93 11    4  Neck pain  M54 2    5  Facial pain  R51 9                   Assessment  Assessment details: Patient presented with right TMJ myalgia  On examination, patient right TMJ is a closed joint with reduction  Patient demonstrated rounded shoulders and forward head, which is also contributing to the TMJ myalgia and limited ROM  Postural deficits were addressed and patient was reeducated on proper posture  It was recommended to return to dentist to adjust mandibular mouthguard for proper alignment which may be the reason symptoms did not improve  Patient was advised to avoid hard foods to help reduce irritation  Patient will benefit from skilled physical therapy to reduce pain, increase ROM, and be able to eat and chew without any limitations     Impairments: abnormal or restricted ROM, abnormal movement, activity intolerance, impaired physical strength, lacks appropriate home exercise program, pain with function and poor posture   Understanding of Dx/Px/POC: good   Prognosis: good    Goals  STG:  In 2 weeks patient will self correct posture 50% of the time  Initiate HEP    LTG:  In 6 weeks patient will self-correct posture 80% of the time  FOTO>    Plan  Patient would benefit from: skilled physical therapy  Planned therapy interventions: abdominal trunk stabilization, joint mobilization, manual therapy, massage, neuromuscular re-education, patient education, postural training, strengthening, stretching, ADL retraining, activity modification, therapeutic exercise, therapeutic activities, therapeutic training, flexibility, functional ROM exercises and home exercise program  Frequency: 2x week  Duration in visits: 12  Duration in weeks: 6  Treatment plan discussed with: patient        Subjective Evaluation    History of Present Illness  Mechanism of injury: Patient arrive wearing a a right wrist and right ankle brace from a fall  Patient has a history of MS since   Patient states a year and a half ago she had a bone density scan and has a precursor for osteopenia  Patient has pain in jaw and headaches  Pain states her throat is also bothering her and does not think it is related to muscle  Jaw pain started a few months ago  Dentist recommended a mouth guard for grinding on the top teeth and patient states it made it worse  Patient has not been back to the dentist  Patient had a CT scan in ER for temporalis swelling a month ago, no significant findings  Patient states the jaw locks a few times a week for the past few months  Patient denies any incident  Recurrent probem    Quality of life: good    Pain  Current pain ratin  At best pain ratin  At worst pain ratin  Quality: sharp, tight and grinding  Relieving factors: medications  Aggravating factors: eating  Progression: worsening    Treatments  Previous treatment: medication  Current treatment: physical therapy  Patient Goals  Patient goals for therapy: decreased pain, increased motion, increased strength and independence with ADLs/IADLs          Objective     Concurrent Complaints  Positive for headaches, tinnitus and trouble swallowing  Postural Observations  Seated posture: poor  Standing posture: poor  Correction of posture: makes symptoms better      TMJ   Occlusion class: class I (normal)  Joint sounds right: clicking and locking  Opening (mm): 35 and left deviation   Lateral excursion, left (mm): 8  Lateral excursion, right (mm)t: 8   Protrusion (mm): 3 and within normal limits   ROM comments: Recapture of disc occurred with tongue-up position  Neuro Exam:     Headaches   Patient reports headaches: Yes                Precautions: Diagnosis Date Comment Source   MS (congenital mitral stenosis)  patient has multiple sclerosis     Multiple sclerosis ([facility] [address] )            Manuals 7/19                                                                Neuro Re-Ed             scap retract HEP            Chin tuck HEP                                                                             Ther Ex             clucking HEP            Controlled opening HEP                                                                                          Ther Activity                                       Gait Training                                       Modalities

## 2021-07-21 ENCOUNTER — OFFICE VISIT (OUTPATIENT)
Dept: PHYSICAL THERAPY | Facility: CLINIC | Age: 51
End: 2021-07-21
Payer: COMMERCIAL

## 2021-07-21 DIAGNOSIS — M26.609 TMJ (TEMPOROMANDIBULAR JOINT SYNDROME): Primary | ICD-10-CM

## 2021-07-21 DIAGNOSIS — R51.9 FACIAL PAIN: ICD-10-CM

## 2021-07-21 DIAGNOSIS — M54.2 NECK PAIN: ICD-10-CM

## 2021-07-21 DIAGNOSIS — H92.01 OTALGIA, RIGHT: ICD-10-CM

## 2021-07-21 DIAGNOSIS — H93.11 RIGHT-SIDED TINNITUS: ICD-10-CM

## 2021-07-21 PROCEDURE — 97112 NEUROMUSCULAR REEDUCATION: CPT | Performed by: PHYSICAL THERAPIST

## 2021-07-21 PROCEDURE — 97140 MANUAL THERAPY 1/> REGIONS: CPT | Performed by: PHYSICAL THERAPIST

## 2021-07-21 NOTE — PROGRESS NOTES
Daily Note     Today's date: 2021  Patient name: Rajan Trevino  : 1970  MRN: [id number]  Referring provider: LARON Cortes  Dx:   Encounter Diagnosis     ICD-10-CM    1  TMJ (temporomandibular joint syndrome)  M26 609    2  Otalgia, right  H92 01    3  Right-sided tinnitus  H93 11    4  Neck pain  M54 2    5  Facial pain  R51 9                   Subjective: Patient reports the face and jaw have improved a little, but is still painful  Objective: See treatment diary below      Assessment: Tolerated treatment well  Patient demonstrated fatigue post treatment, exhibited good technique with therapeutic exercises and would benefit from continued PT      Plan: Continue per plan of care        Precautions:   Diagnosis Date Comment Source   MS (congenital mitral stenosis)  patient has multiple sclerosis     Multiple sclerosis ([facility] [address] )            Manuals            STM jaw, suboccipitals, face, neck  perf           [place] internal STM  perf                                     Neuro Re-Ed             scap retract HEP HEP           Chin tuck HEP HEP           diagrammatic breathing  perf                                                               Ther Ex             clucking HEP HEP           Controlled opening HEP HEP                                                                                         Ther Activity                                       Gait Training                                       Modalities

## 2021-07-26 ENCOUNTER — OFFICE VISIT (OUTPATIENT)
Dept: PHYSICAL THERAPY | Facility: CLINIC | Age: 51
End: 2021-07-26
Payer: COMMERCIAL

## 2021-07-26 DIAGNOSIS — H92.01 OTALGIA, RIGHT: ICD-10-CM

## 2021-07-26 DIAGNOSIS — R51.9 FACIAL PAIN: Primary | ICD-10-CM

## 2021-07-26 DIAGNOSIS — M54.2 NECK PAIN: ICD-10-CM

## 2021-07-26 PROCEDURE — 97112 NEUROMUSCULAR REEDUCATION: CPT | Performed by: PHYSICAL THERAPIST

## 2021-07-26 PROCEDURE — 97110 THERAPEUTIC EXERCISES: CPT | Performed by: PHYSICAL THERAPIST

## 2021-07-26 PROCEDURE — 97140 MANUAL THERAPY 1/> REGIONS: CPT | Performed by: PHYSICAL THERAPIST

## 2021-07-26 NOTE — PROGRESS NOTES
Daily Note     Today's date: 2021  Patient name: Rajan Trevino  : 1970  MRN: [id number]  Referring provider: LARON Cortes  Dx:   Encounter Diagnosis     ICD-10-CM    1  Facial pain  R51 9    2  Otalgia, right  H92 01    3  Neck pain  M54 2                   Subjective: Overall less pain  Objective: See treatment diary below      Assessment: Tolerated treatment well  Patient demonstrated fatigue post treatment and exhibited good technique with therapeutic exercises      Plan: Continue per plan of care        Precautions:   Diagnosis Date Comment Source   MS (congenital mitral stenosis)  patient has multiple sclerosis     Multiple sclerosis ([facility] [address] )            Manuals           STM jaw, suboccipitals, face, neck  perf perf          [place] internal STM  perf perf                                    Neuro Re-Ed             scap retract HEP HEP           Chin tuck HEP HEP           diagrammatic breathing  perf perf                                                              Ther Ex             clucking HEP HEP           Controlled opening HEP HEP                                                                                         Ther Activity                                       Gait Training                                       Modalities

## 2021-07-27 ENCOUNTER — OFFICE VISIT (OUTPATIENT)
Dept: OBGYN CLINIC | Facility: CLINIC | Age: 51
End: 2021-07-27
Payer: COMMERCIAL

## 2021-07-27 VITALS
DIASTOLIC BLOOD PRESSURE: 77 MMHG | BODY MASS INDEX: 34.66 KG/M2 | HEIGHT: 69 IN | WEIGHT: 234 LBS | SYSTOLIC BLOOD PRESSURE: 132 MMHG | HEART RATE: 71 BPM

## 2021-07-27 DIAGNOSIS — M18.0 ARTHRITIS OF CARPOMETACARPAL (CMC) JOINT OF BOTH THUMBS: Primary | ICD-10-CM

## 2021-07-27 DIAGNOSIS — G56.03 CARPAL TUNNEL SYNDROME, BILATERAL: ICD-10-CM

## 2021-07-27 PROCEDURE — 20600 DRAIN/INJ JOINT/BURSA W/O US: CPT | Performed by: ORTHOPAEDIC SURGERY

## 2021-07-27 PROCEDURE — 99213 OFFICE O/P EST LOW 20 MIN: CPT | Performed by: ORTHOPAEDIC SURGERY

## 2021-07-27 PROCEDURE — 20526 THER INJECTION CARP TUNNEL: CPT | Performed by: ORTHOPAEDIC SURGERY

## 2021-07-27 RX ORDER — LIDOCAINE HYDROCHLORIDE 10 MG/ML
0.5 INJECTION, SOLUTION INFILTRATION; PERINEURAL
Status: COMPLETED | OUTPATIENT
Start: 2021-07-27 | End: 2021-07-27

## 2021-07-27 RX ORDER — TRIAMCINOLONE ACETONIDE 40 MG/ML
20 INJECTION, SUSPENSION INTRA-ARTICULAR; INTRAMUSCULAR
Status: COMPLETED | OUTPATIENT
Start: 2021-07-27 | End: 2021-07-27

## 2021-07-27 RX ADMIN — LIDOCAINE HYDROCHLORIDE 0.5 ML: 10 INJECTION, SOLUTION INFILTRATION; PERINEURAL at 12:16

## 2021-07-27 RX ADMIN — TRIAMCINOLONE ACETONIDE 20 MG: 40 INJECTION, SUSPENSION INTRA-ARTICULAR; INTRAMUSCULAR at 12:16

## 2021-07-27 NOTE — PROGRESS NOTES
Assessment/Plan:  1  Arthritis of carpometacarpal (CMC) joint of both thumbs     2  Carpal tunnel syndrome, bilateral       Freddy Sherman is a pleasant 48year old who presents to the office today for a follow-up evaluation of her bilateral CMC joint arthritis and bilateral carpal tunnel syndrome  We discussed the risks and benefits of corticosteroid injection today in the office and she did elect to proceed  The right CMC joint and bilateral carpal tunnel injections were administered without issue and well tolerated by the patient  Post injection instructions were provided  She understands can be repeated no sooner than three months  I discussed with the patient that she should continue the use of the Comfort Cool braces during the daytime as needed and the cock-up wrist braces when she sleeps  I discussed with the patient that I would like her to set up her ultrasound for her left wrist   I will follow-up with her once the ultrasound is completed  She understood and had no further questions  Subjective:   Patient ID: Deepak Murcia is a 48 y o  female who presents to the office today for a follow-up evaluation of her bilateral [facility] joint arthritis and bilateral carpal tunnel syndrome  She received a corticosteroid injection for her left thumb CMC joint on 7/12/2021 and notes improvement in her pain  She was unable to have her right [facility] joint injection at last visit due to a laceration over her right thumb  She states that the laceration is healed and would like to proceed with the corticosteroid injection  She states that she has been complaint with the use of the comfort cool thumb braces  She states that she has been using the bilateral cock-up wrist braces at night when she sleeps and notes improvement in her numbness and tingling  She states that she has not been doing as many fine motor skills lately and notes improvement in her symptoms    She states that she has been unable to get the ultrasound of her left wrist   She states that she would like bilateral corticosteroid injections for her bilateral carpal tunnel syndrome at today's visit  Review of Systems   Constitutional: Negative for chills, fever and unexpected weight change  HENT: Negative for hearing loss, nosebleeds and sore throat  Eyes: Negative for pain, redness and visual disturbance  Respiratory: Negative for cough, shortness of breath and wheezing  Cardiovascular: Negative for chest pain, palpitations and leg swelling  Gastrointestinal: Negative for abdominal pain, nausea and vomiting  Endocrine: Negative for polyphagia and polyuria  Genitourinary: Negative for dysuria and hematuria  Musculoskeletal: Negative for arthralgias, gait problem and joint swelling  Skin: Negative for rash and wound  Neurological: Negative for dizziness, numbness and headaches  Psychiatric/Behavioral: Negative for decreased concentration and suicidal ideas  The patient is not nervous/anxious  Past Medical History:   Diagnosis Date    MS (congenital mitral stenosis)     patient has multiple sclerosis     Multiple sclerosis ([facility] [address] )        Past Surgical History:   Procedure Laterality Date    HYSTERECTOMY      WRIST SURGERY Left 2020       History reviewed  No pertinent family history  Social History     Occupational History    Not on file   Tobacco Use    Smoking status: Never Smoker    Smokeless tobacco: Never Used   Vaping Use    Vaping Use: Never used   Substance and Sexual Activity    Alcohol use:  Yes    Drug use: Never    Sexual activity: Not on file         Current Outpatient Medications:     aspirin 81 mg chewable tablet, , Disp: , Rfl:     azelastine (ASTELIN) 0 1 % nasal spray, 2 sprays into each nostril daily, Disp: , Rfl:     cefdinir (OMNICEF) 300 mg capsule, take 1 capsule by mouth twice a day for 10 days (Patient not taking: Reported on 7/14/2021), Disp: , Rfl:     cyclobenzaprine (FLEXERIL) 10 mg tablet, Take 10 mg by mouth, Disp: , Rfl:     diazepam (Valium) 5 mg tablet, Take 1 tablet by mouth every 12 (twelve) hours as needed (Patient not taking: Reported on 7/14/2021), Disp: , Rfl:     fexofenadine (ALLEGRA) 60 MG tablet, Take 60 mg by mouth daily, Disp: , Rfl:     fluticasone (FLONASE) 50 mcg/act nasal spray, 50 mcg into each nostril daily, Disp: , Rfl:     gabapentin (NEURONTIN) 100 mg capsule, Take 100 mg by mouth 2 (two) times a day, Disp: , Rfl:     HYDROCODONE-APAP-DIETARY PROD PO, Every 6 hours (Patient not taking: Reported on 7/14/2021), Disp: , Rfl:     levothyroxine 50 mcg tablet, Take 50 mcg by mouth daily, Disp: , Rfl:     meloxicam (MOBIC) 15 mg tablet, TAKE 1 TABLET BY MOUTH ONCE A DAY, Disp: 30 tablet, Rfl: 1    montelukast (SINGULAIR) 10 mg tablet, Take 10 mg by mouth daily, Disp: , Rfl:     nitrofurantoin (MACROBID) 100 mg capsule, take 1 capsule by mouth every 12 hours for 7 days (Patient not taking: Reported on 7/14/2021), Disp: , Rfl:     omeprazole (PriLOSEC) 40 MG capsule, TAKE 1 CAPSULE (40 MG) BY MOUTH DAILY BEFORE BREAKFAST, Disp: , Rfl:     phenazopyridine (PYRIDIUM) 200 mg tablet, take 1 tablet by mouth three times a day with meals for 3 days (Patient not taking: Reported on 7/14/2021), Disp: , Rfl:     predniSONE 10 mg tablet, 4 tabs for 3 days then 3 tabs for 3 days then 2 tabs for 3 days then one tab for 3 days, Disp: 30 tablet, Rfl: 0    rosuvastatin (CRESTOR) 10 MG tablet, Take 10 mg by mouth daily, Disp: , Rfl:     Turmeric 500 MG CAPS, , Disp: , Rfl:     No Known Allergies    Objective:  Vitals:    07/27/21 1116   BP: 132/77   Pulse: 71       Ortho Exam   Right Hand  Healed laceration at the thumb dorsal MCP  TTP CMC joint  + Tinel's  5/5 Opposition Strength   Compartments soft  Brisk capillary refill  S/m intact median, radial, and ulnar nerve     Left Hand  + Tinel's  5/5 Opposition Strength  Compartments soft  Brisk capillary refill  S/m intact median, radial, and ulnar nerve     Physical Exam  Vitals reviewed  Constitutional:       Appearance: Normal appearance  She is well-developed  HENT:      Head: Normocephalic and atraumatic  Eyes:      General:         Right eye: No discharge  Left eye: No discharge  Extraocular Movements: Extraocular movements intact  Conjunctiva/sclera: Conjunctivae normal    Cardiovascular:      Rate and Rhythm: Normal rate  Pulmonary:      Effort: Pulmonary effort is normal  No respiratory distress  Musculoskeletal:      Cervical back: Normal range of motion and neck supple  Skin:     General: Skin is warm and dry  Neurological:      General: No focal deficit present  Mental Status: She is alert and oriented to person, place, and time  Psychiatric:         Mood and Affect: Mood normal          Behavior: Behavior normal      Hand/upper extremity injection: bilateral carpal tunnel  [place] Protocol:  Consent: Verbal consent obtained  Risks and benefits: risks, benefits and alternatives were discussed  Consent given by: patient  Site marked: the operative site was marked  Supporting Documentation  Indications: tendon swelling and pain   Procedure Details  Condition:carpal tunnel syndrome Site: bilateral carpal tunnel   Preparation: Patient was prepped and draped in the usual sterile fashion  Needle size: 27 G  Ultrasound guidance: no  Approach: volar    Medications (Right): 0 5 mL lidocaine 1 %; 20 mg triamcinolone acetonide 40 mg/mLMedications (Left): 0 5 mL lidocaine 1 %; 20 mg triamcinolone acetonide 40 mg/mL   Patient tolerance: patient tolerated the procedure well with no immediate complications  Dressing:  Sterile dressing applied     Small joint arthrocentesis: R thumb CMC  [place] Protocol:  Consent: Verbal consent obtained    Risks and benefits: risks, benefits and alternatives were discussed  Consent given by: patient  Site marked: the operative site was marked  Supporting Documentation  Indications: pain and joint swelling   Procedure Details  Location: thumb - R thumb CMC  Needle size: 27 G  Ultrasound guidance: no  Approach: dorsal  Medications administered: 0 5 mL lidocaine 1 %; 20 mg triamcinolone acetonide 40 mg/mL    Patient tolerance: patient tolerated the procedure well with no immediate complications  Dressing:  Sterile dressing applied          I have personally reviewed pertinent films in PACS and my interpretation is as follows: no new images reviewed today

## 2021-07-28 ENCOUNTER — OFFICE VISIT (OUTPATIENT)
Dept: PHYSICAL THERAPY | Facility: CLINIC | Age: 51
End: 2021-07-28
Payer: COMMERCIAL

## 2021-07-28 DIAGNOSIS — H93.11 RIGHT-SIDED TINNITUS: Primary | ICD-10-CM

## 2021-07-28 PROCEDURE — 97140 MANUAL THERAPY 1/> REGIONS: CPT | Performed by: PHYSICAL THERAPIST

## 2021-07-28 PROCEDURE — 97110 THERAPEUTIC EXERCISES: CPT | Performed by: PHYSICAL THERAPIST

## 2021-07-28 PROCEDURE — 97112 NEUROMUSCULAR REEDUCATION: CPT | Performed by: PHYSICAL THERAPIST

## 2021-07-28 NOTE — PROGRESS NOTES
Daily Note     Today's date: 2021  Patient name: Ger Villarreal  : 1970  MRN: [id number]  Referring provider: LARON Romero  Dx:   Encounter Diagnosis     ICD-10-CM    1  Right-sided tinnitus  H93 11                   Subjective: Overall decreased pain  Objective: See treatment diary below      Assessment: Tolerated treatment well  Patient demonstrated fatigue post treatment and exhibited good technique with therapeutic exercises      Plan: Continue per plan of care        Precautions:   Diagnosis Date Comment Source   MS (congenital mitral stenosis)  patient has multiple sclerosis     Multiple sclerosis ([facility] [address] )            Manuals          STM jaw, suboccipitals, face, neck  perf perf perf         [place] internal STM  perf perf perf                                   Neuro Re-Ed             scap retract HEP HEP  10x         Chin tuck HEP HEP  10x         diagrammatic breathing  perf perf 10x                                                             Ther Ex             clucking HEP HEP  10x         Controlled opening HEP HEP  10x                                                                                       Ther Activity                                       Gait Training                                       Modalities

## 2021-07-30 ENCOUNTER — OFFICE VISIT (OUTPATIENT)
Dept: OBGYN CLINIC | Facility: CLINIC | Age: 51
End: 2021-07-30
Payer: COMMERCIAL

## 2021-07-30 VITALS
WEIGHT: 231.6 LBS | HEIGHT: 69 IN | BODY MASS INDEX: 34.3 KG/M2 | HEART RATE: 76 BPM | DIASTOLIC BLOOD PRESSURE: 85 MMHG | SYSTOLIC BLOOD PRESSURE: 142 MMHG

## 2021-07-30 DIAGNOSIS — S86.111D TRAUMATIC RUPTURE OF RIGHT POSTERIOR TIBIAL TENDON, SUBSEQUENT ENCOUNTER: Primary | ICD-10-CM

## 2021-07-30 PROCEDURE — 99214 OFFICE O/P EST MOD 30 MIN: CPT | Performed by: ORTHOPAEDIC SURGERY

## 2021-07-30 NOTE — PROGRESS NOTES
Assessment/Plan:  1  Traumatic rupture of right posterior tibial tendon, subsequent encounter       The patient has a posterior tibial tendon tear with retraction  We explained to the patient and her  that she will require a tendon transfer to surgically correct this problem  She will need to find a foot and ankle specialist to perform this procedure  She can continue her ankle brace in the meantime  She will follow-up as needed  Subjective:   Rajan Trevino is a 48 y o  female who presents today for evaluation of her right ankle  She thinks she injured this about a year ago when she fell and broke her wrist  She did not initially have this evaluated as the focus had been on her wrist  She did start with physical therapy for her ankle initially, which helped some, but due to ongoing swelling and pain, Dr Raysa Casey ordered an MRI which showed a posterior tibial tendon tear with retraction  Dr Raysa Casey did provide her with an ankle brace which does help her  She notes she had no trouble with her ankle prior to this injury, but notes she has always had "flat feet and has worn orthotics for this  Review of Systems   Constitutional: Negative  Negative for chills and fever  HENT: Negative  Negative for ear pain and sore throat  Eyes: Negative  Negative for pain and redness  Respiratory: Negative  Negative for shortness of breath and wheezing  Cardiovascular: Negative for chest pain and palpitations  Gastrointestinal: Negative  Negative for abdominal pain and blood in stool  Endocrine: Negative  Negative for polydipsia and polyuria  Genitourinary: Negative  Negative for difficulty urinating and dysuria  Musculoskeletal:        As noted in HPI   Skin: Negative  Negative for pallor and rash  Neurological: Negative  Negative for dizziness and numbness  Hematological: Negative  Negative for adenopathy  Does not bruise/bleed easily  Psychiatric/Behavioral: Negative    Negative for confusion and suicidal ideas  Past Medical History:   Diagnosis Date    MS (congenital mitral stenosis)     patient has multiple sclerosis     Multiple sclerosis ([address] )        Past Surgical History:   Procedure Laterality Date    HYSTERECTOMY      WRIST SURGERY Left 2020       History reviewed  No pertinent family history  Social History     Occupational History    Not on file   Tobacco Use    Smoking status: Never Smoker    Smokeless tobacco: Never Used   Vaping Use    Vaping Use: Never used   Substance and Sexual Activity    Alcohol use:  Yes    Drug use: Never    Sexual activity: Not on file         Current Outpatient Medications:     aspirin 81 mg chewable tablet, , Disp: , Rfl:     azelastine (ASTELIN) 0 1 % nasal spray, 2 sprays into each nostril daily, Disp: , Rfl:     cyclobenzaprine (FLEXERIL) 10 mg tablet, Take 10 mg by mouth, Disp: , Rfl:     fexofenadine (ALLEGRA) 60 MG tablet, Take 60 mg by mouth daily, Disp: , Rfl:     fluticasone (FLONASE) 50 mcg/act nasal spray, 50 mcg into each nostril daily, Disp: , Rfl:     gabapentin (NEURONTIN) 100 mg capsule, Take 100 mg by mouth 2 (two) times a day, Disp: , Rfl:     levothyroxine 50 mcg tablet, Take 50 mcg by mouth daily, Disp: , Rfl:     meloxicam (MOBIC) 15 mg tablet, TAKE 1 TABLET BY MOUTH ONCE A DAY, Disp: 30 tablet, Rfl: 1    montelukast (SINGULAIR) 10 mg tablet, Take 10 mg by mouth daily, Disp: , Rfl:     omeprazole (PriLOSEC) 40 MG capsule, TAKE 1 CAPSULE (40 MG) BY MOUTH DAILY BEFORE BREAKFAST, Disp: , Rfl:     predniSONE 10 mg tablet, 4 tabs for 3 days then 3 tabs for 3 days then 2 tabs for 3 days then one tab for 3 days, Disp: 30 tablet, Rfl: 0    rosuvastatin (CRESTOR) 10 MG tablet, Take 10 mg by mouth daily, Disp: , Rfl:     cefdinir (OMNICEF) 300 mg capsule, take 1 capsule by mouth twice a day for 10 days (Patient not taking: Reported on 7/14/2021), Disp: , Rfl:     diazepam (Valium) 5 mg tablet, Take 1 tablet by mouth every 12 (twelve) hours as needed (Patient not taking: Reported on 7/14/2021), Disp: , Rfl:     HYDROCODONE-APAP-DIETARY PROD PO, Every 6 hours (Patient not taking: Reported on 7/14/2021), Disp: , Rfl:     nitrofurantoin (MACROBID) 100 mg capsule, take 1 capsule by mouth every 12 hours for 7 days (Patient not taking: Reported on 7/14/2021), Disp: , Rfl:     phenazopyridine (PYRIDIUM) 200 mg tablet, take 1 tablet by mouth three times a day with meals for 3 days (Patient not taking: Reported on 7/14/2021), Disp: , Rfl:     Turmeric 500 MG CAPS, , Disp: , Rfl:     No Known Allergies    Objective:  Vitals:    07/30/21 0954   BP: 142/85   Pulse: 76       Right Ankle Exam     Tenderness   Right ankle tenderness location: tenderness medial ankle  Swelling: mild    Range of Motion   Dorsiflexion: 20   Plantar flexion: 35     Muscle Strength   Dorsiflexion:  5/5  Plantar flexion:  5/5  Posterior tibial:  2/5  Peroneal muscle:  5/5    Other   Erythema: absent  Sensation: normal  Pulse: present     Comments:  Pes planus  Hindfoot valgus  Too many toes sign Supple hindfoot  20 degrees subtalar arc of motion  Left Ankle Exam     Comments:  Mild hindfoot valgus with pes planus  Strength/Myotome Testing     Right Ankle/Foot   Dorsiflexion: 5  Plantar flexion: 5      Physical Exam  Constitutional:       General: She is not in acute distress  Appearance: She is well-developed  HENT:      Head: Normocephalic and atraumatic  Eyes:      General: No scleral icterus  Conjunctiva/sclera: Conjunctivae normal    Neck:      Vascular: No JVD  Cardiovascular:      Rate and Rhythm: Normal rate  Pulmonary:      Effort: Pulmonary effort is normal  No respiratory distress  Skin:     General: Skin is warm  Neurological:      Mental Status: She is alert and oriented to person, place, and time        Coordination: Coordination normal          I have personally reviewed pertinent films in PACS and my interpretation is as follows:  MRI left ankle: Complete tear posterior tibial tendon with retraction

## 2021-08-02 ENCOUNTER — OFFICE VISIT (OUTPATIENT)
Dept: PHYSICAL THERAPY | Facility: CLINIC | Age: 51
End: 2021-08-02
Payer: COMMERCIAL

## 2021-08-02 DIAGNOSIS — R51.9 FACIAL PAIN: ICD-10-CM

## 2021-08-02 DIAGNOSIS — M26.609 TMJ (TEMPOROMANDIBULAR JOINT SYNDROME): Primary | ICD-10-CM

## 2021-08-02 PROCEDURE — 97112 NEUROMUSCULAR REEDUCATION: CPT | Performed by: PHYSICAL THERAPIST

## 2021-08-02 NOTE — PROGRESS NOTES
Daily Note     Today's date: 2021  Patient name: Jacquie Lockett  : 1970  MRN: [id number]  Referring provider: Collette Rossetti, CRNP  Dx:   Encounter Diagnosis     ICD-10-CM    1  TMJ (temporomandibular joint syndrome)  M26 609    2  Facial pain  R51 9                   Subjective: The past 2 days, no headache  Objective: See treatment diary below      Assessment: Tolerated treatment well  Patient demonstrated fatigue post treatment and exhibited good technique with therapeutic exercises      Plan: Continue per plan of care        Precautions:   Diagnosis Date Comment Source   MS (congenital mitral stenosis)  patient has multiple sclerosis     Multiple sclerosis ([facility] [address] )            Manuals          STM jaw, suboccipitals, face, neck  perf perf perf         [place] internal STM  perf perf perf                                   Neuro Re-Ed             scap retract HEP HEP  10x         Chin tuck HEP HEP  10x         diagrammatic breathing  perf perf 10x         Neurac cerv retract supine     2x5        Neurac cerv retract SDLY     2x5        Neurac scap retract supine     2x5        Neurac scap retract w depression sit     2x5        Ther Ex             clucking HEP HEP  10x         Controlled opening HEP HEP  10x                                   Neurac thoracic ext sit     2x5                                               Ther Activity                                       Gait Training                                       Modalities

## 2021-08-04 ENCOUNTER — OFFICE VISIT (OUTPATIENT)
Dept: PHYSICAL THERAPY | Facility: CLINIC | Age: 51
End: 2021-08-04
Payer: COMMERCIAL

## 2021-08-04 DIAGNOSIS — M54.2 NECK PAIN: Primary | ICD-10-CM

## 2021-08-04 PROCEDURE — 97112 NEUROMUSCULAR REEDUCATION: CPT | Performed by: PHYSICAL THERAPIST

## 2021-08-04 PROCEDURE — 97110 THERAPEUTIC EXERCISES: CPT | Performed by: PHYSICAL THERAPIST

## 2021-08-04 PROCEDURE — 97140 MANUAL THERAPY 1/> REGIONS: CPT | Performed by: PHYSICAL THERAPIST

## 2021-08-04 NOTE — PROGRESS NOTES
Daily Note     Today's date: 2021  Patient name: Kwabena Stearns  : 1970  MRN: [id number]  Referring provider: LARON Valle  Dx:   Encounter Diagnosis     ICD-10-CM    1  Neck pain  M54 2                   Subjective: I am feeling overall less neck and headache pain  Objective: See treatment diary below      Assessment: Tolerated treatment well  Patient demonstrated fatigue post treatment and exhibited good technique with therapeutic exercises      Plan: Continue per plan of care        Precautions:   Diagnosis Date Comment Source   MS (congenital mitral stenosis)  patient has multiple sclerosis     Multiple sclerosis ([facility] [address] )            Manuals        STM jaw, suboccipitals, face, neck  perf perf perf  perf       [place] internal STM  perf perf perf  perf                                 Neuro Re-Ed             scap retract HEP HEP  10x  10x       Chin tuck HEP HEP  10x  10x       diagrammatic breathing  perf perf 10x  10x       Neurac cerv retract supine     2x5        Neurac cerv retract SDLY     2x5        Neurac scap retract supine     2x5        Neurac scap retract w depression sit     2x5        Ther Ex             clucking HEP HEP  10x         Controlled opening HEP HEP  10x                                   Neurac thoracic ext sit     2x5                                               Ther Activity                                       Gait Training                                       Modalities

## 2021-08-06 ENCOUNTER — TELEPHONE (OUTPATIENT)
Dept: OBGYN CLINIC | Facility: HOSPITAL | Age: 51
End: 2021-08-06

## 2021-08-06 NOTE — TELEPHONE ENCOUNTER
Dr Nancy Tsang    Patient was told by her insurance company that Dr Nancy Tsang has to apply for authorization so that Dr Julio Ernandez can treat her  She has [facility]        # [id number]

## 2021-08-06 NOTE — TELEPHONE ENCOUNTER
It appears this patient has [facility] [employer] it is unlikely they will approve an out of network auth for an office visit  What is the urgency of her being seen by a foot/ankle surgeon?

## 2021-08-07 NOTE — TELEPHONE ENCOUNTER
Patient spoke to [facility] and there is no Foot/ankle surgeon in [place]  She said she was on the phone 30 min on the phone  Can someone help the process to get her auth to see Dr Sherri Hyatt, with [phone]

## 2021-08-09 ENCOUNTER — OFFICE VISIT (OUTPATIENT)
Dept: PHYSICAL THERAPY | Facility: CLINIC | Age: 51
End: 2021-08-09
Payer: COMMERCIAL

## 2021-08-09 DIAGNOSIS — H92.01 OTALGIA, RIGHT: Primary | ICD-10-CM

## 2021-08-09 PROCEDURE — 97112 NEUROMUSCULAR REEDUCATION: CPT | Performed by: PHYSICAL THERAPIST

## 2021-08-09 PROCEDURE — 97140 MANUAL THERAPY 1/> REGIONS: CPT | Performed by: PHYSICAL THERAPIST

## 2021-08-09 PROCEDURE — 97110 THERAPEUTIC EXERCISES: CPT | Performed by: PHYSICAL THERAPIST

## 2021-08-09 NOTE — PROGRESS NOTES
Daily Note     Today's date: 2021  Patient name: Michael Weber  : 1970  MRN: [id number]  Referring provider: LARON Monsalve  Dx:   Encounter Diagnosis     ICD-10-CM    1  Otalgia, right  H92 01                   Subjective: Overall my neck/face is feeling better      Objective: See treatment diary below      Assessment: Tolerated treatment well  Patient demonstrated fatigue post treatment and exhibited good technique with therapeutic exercises      Plan: Continue per plan of care        Precautions:   Diagnosis Date Comment Source   MS (congenital mitral stenosis)  patient has multiple sclerosis     Multiple sclerosis ([facility] [address] )            Manuals       STM jaw, suboccipitals, face, neck  perf perf perf  perf perf      [place] internal STM  perf perf perf  perf perf                                Neuro Re-Ed             scap retract HEP HEP  10x  10x 15x      Chin tuck HEP HEP  10x  10x 15x      diagrammatic breathing  perf perf 10x  10x 15x      Neurac cerv retract supine     2x5        Neurac cerv retract SDLY     2x5        Neurac scap retract supine     2x5        Neurac scap retract w depression sit     2x5        Ther Ex             clucking HEP HEP  10x         Controlled opening HEP HEP  10x                                   Neurac thoracic ext sit     2x5                                               Ther Activity                                       Gait Training                                       Modalities

## 2021-08-10 NOTE — TELEPHONE ENCOUNTER
Spoke to Christiano at [facility] [phone], recvd pending auth # [id number]  Faxed Clinical Note & MRI rpt per request to Utilization Management at [phone]    Elyssa No can take 14 bus days and would be recvd via fax to office [phone]    Call ref # [id number] (name/date/time)

## 2021-08-11 ENCOUNTER — OFFICE VISIT (OUTPATIENT)
Dept: PHYSICAL THERAPY | Facility: CLINIC | Age: 51
End: 2021-08-11
Payer: COMMERCIAL

## 2021-08-11 ENCOUNTER — OFFICE VISIT (OUTPATIENT)
Dept: OTOLARYNGOLOGY | Facility: CLINIC | Age: 51
End: 2021-08-11
Payer: COMMERCIAL

## 2021-08-11 VITALS — BODY MASS INDEX: 34.07 KG/M2 | TEMPERATURE: 97.1 F | WEIGHT: 230 LBS | HEIGHT: 69 IN

## 2021-08-11 DIAGNOSIS — H93.11 RIGHT-SIDED TINNITUS: Primary | ICD-10-CM

## 2021-08-11 DIAGNOSIS — R51.9 FACIAL PAIN: ICD-10-CM

## 2021-08-11 DIAGNOSIS — M26.609 TMJ (TEMPOROMANDIBULAR JOINT SYNDROME): ICD-10-CM

## 2021-08-11 DIAGNOSIS — K21.9 LARYNGOPHARYNGEAL REFLUX (LPR): ICD-10-CM

## 2021-08-11 DIAGNOSIS — G35 MULTIPLE SCLEROSIS (HCC): ICD-10-CM

## 2021-08-11 DIAGNOSIS — R07.0 THROAT PAIN: ICD-10-CM

## 2021-08-11 DIAGNOSIS — M54.2 NECK PAIN: ICD-10-CM

## 2021-08-11 DIAGNOSIS — H92.01 OTALGIA, RIGHT: ICD-10-CM

## 2021-08-11 PROCEDURE — 99214 OFFICE O/P EST MOD 30 MIN: CPT | Performed by: NURSE PRACTITIONER

## 2021-08-11 PROCEDURE — 97140 MANUAL THERAPY 1/> REGIONS: CPT | Performed by: PHYSICAL THERAPIST

## 2021-08-11 NOTE — ASSESSMENT & PLAN NOTE
Reviewed recurrent otalgia and possible causes  Discussed ETD, dental changes, vs ear processes  Noted crepitus of right TM joint with increased muscle tension bilaterally  No parotid swelling, no changes of TM or outer ear  Audiogram last visit indicating normal/borderline bilateral hearing, tymps type A  Discussed tinnitus and possible causes in presence of normal hearing including MS, TMJ syndrome, vs ear related changes  Discussed TMJ syndrome in detail  Reviewed options for treatment including soft diet, jaw rest, NSAIDs, warm compresses, oral steroids, massage, PT, oral appliance, or consultation with oral surgeon for possible Botox  After discussion agreed to continue PT and proceed with MRI brain with IAC due to history MS and right sided tinnitus with otalgia      Follow up depending on results

## 2021-08-11 NOTE — PROGRESS NOTES
Daily Note     Today's date: 2021  Patient name: Rajan Trevino  : 1970  MRN: [id number]  Referring provider: LARON Cortes  Dx:   Encounter Diagnosis     ICD-10-CM    1  Right-sided tinnitus  H93 11    2  Otalgia, right  H92 01    3  Neck pain  M54 2    4  TMJ (temporomandibular joint syndrome)  M26 609    5  Facial pain  R51 9                   Subjective: I am not getting the "buzzing" as loud as I use to  Objective: See treatment diary below      Assessment: Tolerated treatment well  Patient demonstrated fatigue post treatment, exhibited good technique with therapeutic exercises and would benefit from continued PT      Plan: Continue per plan of care        Precautions:   Diagnosis Date Comment Source   MS (congenital mitral stenosis)  patient has multiple sclerosis     Multiple sclerosis ([facility] [address] )            Manuals      STM jaw, suboccipitals, face, neck  perf perf perf  perf perf perf     [place] internal STM  perf perf perf  perf perf                                Neuro Re-Ed             scap retract HEP HEP  10x  10x 15x      Chin tuck HEP HEP  10x  10x 15x      diagrammatic breathing  perf perf 10x  10x 15x      Neurac cerv retract supine     2x5        Neurac cerv retract SDLY     2x5        Neurac scap retract supine     2x5        Neurac scap retract w depression sit     2x5        Ther Ex             clucking HEP HEP  10x         Controlled opening HEP HEP  10x                                   Neurac thoracic ext sit     2x5                                               Ther Activity                                       Gait Training                                       Modalities

## 2021-08-11 NOTE — PROGRESS NOTES
Assessment/Plan:    Laryngopharyngeal reflux (LPR)  Discussed throat related symptoms and history of reflux  Reviewed LPR causes, symptoms, and treatment options  Informed pt about inflammation caused by acid reflux impacting the vocal cords  Offered laryngoscope in office today to rule out concerns causing the symptoms  Additional options include PPI, H 2 blockers, or GI consultation  Agreed to resume reflux medications  Follow up for laryngoscopy if needed        Right-sided tinnitus  Reviewed recurrent otalgia and possible causes  Discussed ETD, dental changes, vs ear processes  Noted crepitus of right TM joint with increased muscle tension bilaterally  No parotid swelling, no changes of TM or outer ear  Audiogram last visit indicating normal/borderline bilateral hearing, tymps type A  Discussed tinnitus and possible causes in presence of normal hearing including MS, TMJ syndrome, vs ear related changes  Discussed TMJ syndrome in detail  Reviewed options for treatment including soft diet, jaw rest, NSAIDs, warm compresses, oral steroids, massage, PT, oral appliance, or consultation with oral surgeon for possible Botox  After discussion agreed to continue PT and proceed with MRI brain with IAC due to history MS and right sided tinnitus with otalgia      Follow up depending on results         Diagnoses and all orders for this visit:    Right-sided tinnitus  -     MRI brain IAC wo and w contrast; Future    Otalgia, right  -     MRI brain IAC wo and w contrast; Future    TMJ (temporomandibular joint syndrome)  -     MRI brain IAC wo and w contrast; Future    Neck pain  -     MRI brain IAC wo and w contrast; Future    Facial pain  -     MRI brain IAC wo and w contrast; Future    Throat pain  -     MRI brain IAC wo and w contrast; Future    Multiple sclerosis (HCC)  -     MRI brain IAC wo and w contrast; Future    Laryngopharyngeal reflux (LPR)          Subjective:      Patient ID: Gudelia Ca is a 48 y o  female  Presents today as a follow up due to right ear discomfort  Occurring for past few months  Facial swelling extending to temple right ear  Ringing in right ear  Feels pressure behind the sinus  This persists even after recent surgery  Surgery last August for deviated septum, Dr Wanda Duffy in [facility]  History sinus infections  Taking antihistamines and Flonase with Astelin  History allergies  Recurrent sore throat over past year  Treated with multiple round of antibiotics  Also notes saliva salty at times, or different taste  Difficulty eating spicy food, burns throat  Following Dr Christiano Cadena for history MS        Since last visit noticed she does clench jaw at night  Felt some improvement with symptoms during PT  Additional concerns include inside of throat pain  Feels dryness in throat  Pain with swallowing  Worse at night  Frequent throat clearing  History on reflux medications  Prior ENT care with [facility]  Current neurology for MS care        The following portions of the patient's history were reviewed and updated as appropriate: allergies, current medications, past family history, past medical history, past social history, past surgical history and problem list     Review of Systems   Constitutional: Negative  HENT: Positive for ear pain and sore throat  Negative for congestion, ear discharge, hearing loss, nosebleeds, postnasal drip, rhinorrhea, sinus pressure, sinus pain, tinnitus and voice change  Eyes: Negative  Respiratory: Negative for chest tightness and shortness of breath  Cardiovascular: Negative  Gastrointestinal: Negative  Endocrine: Negative  Musculoskeletal: Positive for neck pain  Skin: Negative for color change  Neurological: Negative for dizziness, numbness and headaches  Psychiatric/Behavioral: Negative            Objective:      Temp (!) 97 1 °F (36 2 °C) (Temporal)   Ht 5' 9" (1 753 m)   Wt 104 kg (230 lb)   BMI 33 97 kg/m² Physical Exam  Constitutional:       Appearance: She is well-developed  HENT:      Head: Normocephalic  Right Ear: Hearing, tympanic membrane, ear canal and external ear normal  No decreased hearing noted  No drainage or tenderness  Tympanic membrane is not perforated or erythematous  Left Ear: Hearing, tympanic membrane, ear canal and external ear normal  No decreased hearing noted  No drainage or tenderness  Tympanic membrane is not perforated or erythematous  Nose: Nose normal  No nasal deformity or septal deviation  Mouth/Throat:      Mouth: Mucous membranes are not pale and not dry  No oral lesions  Dentition: Normal dentition  Pharynx: Uvula midline  No oropharyngeal exudate  Neck:      Trachea: No tracheal deviation  Cardiovascular:      Rate and Rhythm: Normal rate  Pulmonary:      Effort: Pulmonary effort is normal  No accessory muscle usage or respiratory distress  Musculoskeletal:      Right shoulder: Normal range of motion  Cervical back: Full passive range of motion without pain, normal range of motion and neck supple  Lymphadenopathy:      Cervical: No cervical adenopathy  Skin:     General: Skin is warm and dry  Neurological:      Mental Status: She is alert and oriented to person, place, and time  Cranial Nerves: No cranial nerve deficit  Sensory: No sensory deficit  Psychiatric:         Behavior: Behavior is cooperative

## 2021-08-16 ENCOUNTER — APPOINTMENT (OUTPATIENT)
Dept: PHYSICAL THERAPY | Facility: CLINIC | Age: 51
End: 2021-08-16
Payer: COMMERCIAL

## 2021-08-18 ENCOUNTER — APPOINTMENT (OUTPATIENT)
Dept: PHYSICAL THERAPY | Facility: CLINIC | Age: 51
End: 2021-08-18
Payer: COMMERCIAL

## 2021-08-22 ENCOUNTER — HOSPITAL ENCOUNTER (EMERGENCY)
Facility: HOSPITAL | Age: 51
Discharge: HOME | End: 2021-08-22
Attending: EMERGENCY MEDICINE | Admitting: EMERGENCY MEDICINE
Payer: COMMERCIAL

## 2021-08-22 VITALS
TEMPERATURE: 97.8 F | BODY MASS INDEX: 34.26 KG/M2 | SYSTOLIC BLOOD PRESSURE: 154 MMHG | HEART RATE: 84 BPM | RESPIRATION RATE: 24 BRPM | OXYGEN SATURATION: 96 % | DIASTOLIC BLOOD PRESSURE: 83 MMHG | WEIGHT: 232 LBS

## 2021-08-22 DIAGNOSIS — L03.90 CELLULITIS: ICD-10-CM

## 2021-08-22 DIAGNOSIS — S61.019A THUMB LACERATION: Primary | ICD-10-CM

## 2021-08-22 DIAGNOSIS — L73.9 FOLLICULITIS: ICD-10-CM

## 2021-08-22 PROCEDURE — 99284 EMERGENCY DEPT VISIT MOD MDM: CPT | Performed by: EMERGENCY MEDICINE

## 2021-08-22 PROCEDURE — 99283 EMERGENCY DEPT VISIT LOW MDM: CPT

## 2021-08-22 PROCEDURE — 10060 I&D ABSCESS SIMPLE/SINGLE: CPT | Performed by: EMERGENCY MEDICINE

## 2021-08-22 PROCEDURE — 90715 TDAP VACCINE 7 YRS/> IM: CPT | Performed by: EMERGENCY MEDICINE

## 2021-08-22 PROCEDURE — 90471 IMMUNIZATION ADMIN: CPT

## 2021-08-22 RX ORDER — GINSENG 100 MG
1 CAPSULE ORAL ONCE
Status: COMPLETED | OUTPATIENT
Start: 2021-08-22 | End: 2021-08-22

## 2021-08-22 RX ORDER — CEPHALEXIN 500 MG/1
500 CAPSULE ORAL EVERY 8 HOURS SCHEDULED
Qty: 21 CAPSULE | Refills: 0 | Status: SHIPPED | OUTPATIENT
Start: 2021-08-22 | End: 2021-08-29

## 2021-08-22 RX ORDER — LIDOCAINE HYDROCHLORIDE AND EPINEPHRINE 10; 10 MG/ML; UG/ML
20 INJECTION, SOLUTION INFILTRATION; PERINEURAL ONCE
Status: COMPLETED | OUTPATIENT
Start: 2021-08-22 | End: 2021-08-22

## 2021-08-22 RX ORDER — CEPHALEXIN 500 MG/1
500 CAPSULE ORAL ONCE
Status: COMPLETED | OUTPATIENT
Start: 2021-08-22 | End: 2021-08-22

## 2021-08-22 RX ADMIN — CEPHALEXIN 500 MG: 500 CAPSULE ORAL at 19:49

## 2021-08-22 RX ADMIN — BACITRACIN 1 SMALL APPLICATION: 500 OINTMENT TOPICAL at 19:54

## 2021-08-22 RX ADMIN — TETANUS TOXOID, REDUCED DIPHTHERIA TOXOID AND ACELLULAR PERTUSSIS VACCINE, ADSORBED 0.5 ML: 5; 2.5; 8; 8; 2.5 SUSPENSION INTRAMUSCULAR at 19:50

## 2021-08-22 RX ADMIN — LIDOCAINE HYDROCHLORIDE,EPINEPHRINE BITARTRATE 20 ML: 10; .01 INJECTION, SOLUTION INFILTRATION; PERINEURAL at 19:53

## 2021-08-22 NOTE — ED PROVIDER NOTES
History  Chief Complaint   Patient presents with    Abscess     states a week ago cut leg while shaving, since then noted several other areas that look infected around these which she popped  looking worse    Thumb Laceration     week old thumb laceration on a knife she wants  to look at     49 y/o female presents with a small swelling to her right lower leg redness and scabs below the knee after she was shaving  Her tetanus is not up to date  She denies any other injuries or complaints  Patient is not a diabetic  Patient also had a thumb laceration sustained a week ago, and wants it looked at  History provided by:  Patient   used: No        Prior to Admission Medications   Prescriptions Last Dose Informant Patient Reported? Taking?    HYDROCODONE-APAP-DIETARY PROD PO Not Taking at Unknown time  Yes No   Sig: Every 6 hours   Patient not taking: Reported on 2021   aspirin 81 mg chewable tablet 2021 at Unknown time  Yes Yes   azelastine (ASTELIN) 0 1 % nasal spray   Yes No   Si sprays into each nostril daily   cefdinir (OMNICEF) 300 mg capsule Not Taking at Unknown time  Yes No   Sig: take 1 capsule by mouth twice a day for 10 days   Patient not taking: Reported on 2021   cyclobenzaprine (FLEXERIL) 10 mg tablet   Yes No   Sig: Take 10 mg by mouth   diazepam (Valium) 5 mg tablet Not Taking at Unknown time  Yes No   Sig: Take 1 tablet by mouth every 12 (twelve) hours as needed   Patient not taking: Reported on 2021   fexofenadine (ALLEGRA) 60 MG tablet   Yes No   Sig: Take 60 mg by mouth daily   fluticasone (FLONASE) 50 mcg/act nasal spray   Yes No   Si mcg into each nostril daily   gabapentin (NEURONTIN) 100 mg capsule   Yes No   Sig: Take 100 mg by mouth 2 (two) times a day   levothyroxine 50 mcg tablet   Yes No   Sig: Take 50 mcg by mouth daily   meloxicam (MOBIC) 15 mg tablet   No No   Sig: TAKE 1 TABLET BY MOUTH ONCE A DAY   montelukast (SINGULAIR) 10 mg tablet   Yes No   Sig: Take 10 mg by mouth daily   nitrofurantoin (MACROBID) 100 mg capsule Not Taking at Unknown time  Yes No   Sig: take 1 capsule by mouth every 12 hours for 7 days   Patient not taking: Reported on 2021   omeprazole (PriLOSEC) 40 MG capsule   Yes No   Sig: TAKE 1 CAPSULE (40 MG) BY MOUTH DAILY BEFORE BREAKFAST   phenazopyridine (PYRIDIUM) 200 mg tablet   Yes No   Sig: take 1 tablet by mouth three times a day with meals for 3 days   Patient not taking: Reported on 2021   predniSONE 10 mg tablet Not Taking at Unknown time  No No   Si tabs for 3 days then 3 tabs for 3 days then 2 tabs for 3 days then one tab for 3 days   Patient not taking: Reported on 2021   rosuvastatin (CRESTOR) 10 MG tablet   Yes No   Sig: Take 10 mg by mouth daily      Facility-Administered Medications: None       Past Medical History:   Diagnosis Date    MS (congenital mitral stenosis)     patient has multiple sclerosis     Multiple sclerosis ([facility] [address] )        Past Surgical History:   Procedure Laterality Date    HYSTERECTOMY      WRIST SURGERY Left        No family history on file  I have reviewed and agree with the history as documented  E-Cigarette/Vaping    E-Cigarette Use Never User      E-Cigarette/Vaping Substances    Nicotine No     THC No     CBD No     Flavoring No     Other No     Unknown No      Social History     Tobacco Use    Smoking status: Never Smoker    Smokeless tobacco: Never Used   Vaping Use    Vaping Use: Never used   Substance Use Topics    Alcohol use: Yes    Drug use: Never       Review of Systems   Constitutional: Negative  HENT: Negative  Eyes: Negative  Respiratory: Negative  Cardiovascular: Negative  Gastrointestinal: Negative  Endocrine: Negative  Genitourinary: Negative  Musculoskeletal: Negative  Skin: Positive for rash and wound  Negative for pallor  Allergic/Immunologic: Negative  Neurological: Negative      Hematological: Negative  Psychiatric/Behavioral: Negative  All other systems reviewed and are negative  Physical Exam  Physical Exam  Constitutional:       Appearance: Normal appearance  HENT:      Head: Normocephalic and atraumatic  Nose: Nose normal       Mouth/Throat:      Mouth: Mucous membranes are moist    Eyes:      Extraocular Movements: Extraocular movements intact  Pupils: Pupils are equal, round, and reactive to light  Cardiovascular:      Rate and Rhythm: Normal rate and regular rhythm  Pulmonary:      Effort: Pulmonary effort is normal       Breath sounds: Normal breath sounds  Abdominal:      General: Abdomen is flat  Bowel sounds are normal       Palpations: Abdomen is soft  Musculoskeletal:         General: Normal range of motion  Cervical back: Normal range of motion and neck supple  Comments: Right lower leg: small area of edema noted, with erythema no drainage  Multiple areas of red lesions noted consistent with folliculitis around the swelling  ROM at the knee joint is intact  Popliteal pulse intact  Left thumb: 2 cm linear laceration volar aspect of the thumb superficial no abscess or surrounding edema or erythema  ROM at the MCP, PIP joint intact  Healing well  Skin:     General: Skin is warm  Capillary Refill: Capillary refill takes less than 2 seconds  Neurological:      General: No focal deficit present  Mental Status: She is alert and oriented to person, place, and time  Mental status is at baseline  Psychiatric:         Mood and Affect: Mood normal          Thought Content:  Thought content normal          Vital Signs  ED Triage Vitals [08/22/21 1923]   Temperature Pulse Respirations Blood Pressure SpO2   97 8 °F (36 6 °C) 84 (!) 24 154/83 96 %      Temp Source Heart Rate Source Patient Position - Orthostatic VS BP Location FiO2 (%)   Tympanic Monitor Sitting Right arm --      Pain Score       2           Vitals:    08/22/21 1923   BP: 154/83 Pulse: 84   Patient Position - Orthostatic VS: Sitting         Visual Acuity      ED Medications  Medications   tetanus-diphtheria-acellular pertussis (BOOSTRIX) IM injection 0 5 mL (0 5 mL Intramuscular Given 8/22/21 1950)   cephalexin (KEFLEX) capsule 500 mg (500 mg Oral Given 8/22/21 1949)   lidocaine-epinephrine (XYLOCAINE/EPINEPHRINE) 1 %-1:100,000 injection 20 mL (20 mL Infiltration Given by Other 8/22/21 1953)   bacitracin topical ointment 1 small application (1 small application Topical Given 8/22/21 1954)       Diagnostic Studies  Results Reviewed     None                 No orders to display              Procedures  Incision and drain    Date/Time: 8/22/2021 7:22 PM  Performed by: Viviana Looney DO  Authorized by: Viviana Looney DO   Universal Protocol:  Procedure performed by:  Consent given by: patient  Patient identity confirmed: arm band and verbally with patient      Patient location:  ED  Location:     Type:  Abscess    Location:  Lower extremity    Lower extremity location:  R leg  Pre-procedure details:     Skin preparation:  Betadine  Anesthesia (see MAR for exact dosages): Anesthesia method:  Local infiltration    Local anesthetic:  Lidocaine 1% WITH epi  Procedure details:     Complexity:  Simple    Needle aspiration: no      Incision types:  Stab incision    Scalpel blade:  11    Approach:  Open    Incision depth:  Subcutaneous    Wound management:  Probed and deloculated    Drainage:  Bloody    Drainage amount:  Scant    Wound treatment:  Wound left open  Post-procedure details:     Patient tolerance of procedure: Tolerated well, no immediate complications             ED Course                                           MDM  Number of Diagnoses or Management Options  Cellulitis  Folliculitis  Thumb laceration  Diagnosis management comments: Placed steri strips on the left thumb      Patient Progress  Patient progress: stable      Disposition  Final diagnoses:   Thumb laceration Folliculitis   Cellulitis     Time reflects when diagnosis was documented in both MDM as applicable and the Disposition within this note     Time User Action Codes Description Comment    8/22/2021  8:16 PM AnepuBoba Add [S61 019A] Thumb laceration     8/22/2021  8:16 PM Anepu, Magda Add [D48 7] Folliculitis     6/11/0744  8:16 PM Anepu, Magda Add [L03 90] Cellulitis       ED Disposition     ED Disposition Condition Date/Time Comment    Discharge Stable Sun Aug 22, 2021  8:16 PM Thang Estrin discharge to home/self care              Follow-up Information     Follow up With Specialties Details Why Contact Info Additional Information    Breonna Segovia DO Family Medicine Schedule an appointment as soon as possible for a visit   [address] [facility] [address]       [address] [place] [address] Emergency Department Emergency Medicine  If symptoms worsen [address] [address] Emergency Department, [facility], [facility]          Discharge Medication List as of 8/22/2021  8:17 PM      START taking these medications    Details   cephalexin (KEFLEX) 500 mg capsule Take 1 capsule (500 mg total) by mouth every 8 (eight) hours for 7 days, Starting Sun 8/22/2021, Until Sun 8/29/2021, Normal      mupirocin (BACTROBAN) 2 % ointment Apply topically 3 (three) times a day for 7 days, Starting Sun 8/22/2021, Until Sun 8/29/2021, Normal         CONTINUE these medications which have NOT CHANGED    Details   aspirin 81 mg chewable tablet Historical Med      azelastine (ASTELIN) 0 1 % nasal spray 2 sprays into each nostril daily, Starting Mon 5/17/2021, Historical Med      cefdinir (OMNICEF) 300 mg capsule take 1 capsule by mouth twice a day for 10 days, Historical Med      cyclobenzaprine (FLEXERIL) 10 mg tablet Take 10 mg by mouth, Starting Tue 5/18/2021, Historical Med      diazepam (Valium) 5 mg tablet Take 1 tablet by mouth every 12 (twelve) hours as needed, Historical Med      fexofenadine (ALLEGRA) 60 MG tablet Take 60 mg by mouth daily, Historical Med      fluticasone (FLONASE) 50 mcg/act nasal spray 50 mcg into each nostril daily, Starting Thu 6/24/2021, Until Fri 6/24/2022, Historical Med      gabapentin (NEURONTIN) 100 mg capsule Take 100 mg by mouth 2 (two) times a day, Historical Med      HYDROCODONE-APAP-DIETARY PROD PO Every 6 hours, Historical Med      levothyroxine 50 mcg tablet Take 50 mcg by mouth daily, Historical Med      meloxicam (MOBIC) 15 mg tablet TAKE 1 TABLET BY MOUTH ONCE A DAY, Normal      montelukast (SINGULAIR) 10 mg tablet Take 10 mg by mouth daily, Starting Mon 5/17/2021, Historical Med      nitrofurantoin (MACROBID) 100 mg capsule take 1 capsule by mouth every 12 hours for 7 days, Historical Med      omeprazole (PriLOSEC) 40 MG capsule TAKE 1 CAPSULE (40 MG) BY MOUTH DAILY BEFORE BREAKFAST, Historical Med      phenazopyridine (PYRIDIUM) 200 mg tablet take 1 tablet by mouth three times a day with meals for 3 days, Historical Med      predniSONE 10 mg tablet 4 tabs for 3 days then 3 tabs for 3 days then 2 tabs for 3 days then one tab for 3 days, Normal      rosuvastatin (CRESTOR) 10 MG tablet Take 10 mg by mouth daily, Historical Med           No discharge procedures on file      PDMP Review     None          ED Provider  Electronically Signed by           Kris Sheridan DO  08/24/21 2399

## 2021-08-23 ENCOUNTER — OFFICE VISIT (OUTPATIENT)
Dept: PHYSICAL THERAPY | Facility: CLINIC | Age: 51
End: 2021-08-23
Payer: COMMERCIAL

## 2021-08-23 DIAGNOSIS — R51.9 FACIAL PAIN: ICD-10-CM

## 2021-08-23 DIAGNOSIS — M54.2 NECK PAIN: Primary | ICD-10-CM

## 2021-08-23 PROCEDURE — 97112 NEUROMUSCULAR REEDUCATION: CPT | Performed by: PHYSICAL THERAPIST

## 2021-08-23 PROCEDURE — 97110 THERAPEUTIC EXERCISES: CPT | Performed by: PHYSICAL THERAPIST

## 2021-08-23 PROCEDURE — 97140 MANUAL THERAPY 1/> REGIONS: CPT | Performed by: PHYSICAL THERAPIST

## 2021-08-23 NOTE — PROGRESS NOTES
Daily Note     Today's date: 2021  Patient name: Rosetta Barthel  : 1970  MRN: [id number]  Referring provider: LARON Aguilar  Dx:   Encounter Diagnosis     ICD-10-CM    1  Neck pain  M54 2    2  Facial pain  R51 9                   Subjective: My right temple is sore  Objective: See treatment diary below      Assessment: Tolerated treatment well  Patient demonstrated fatigue post treatment and exhibited good technique with therapeutic exercises      Plan: Continue per plan of care        Precautions:   Diagnosis Date Comment Source   MS (congenital mitral stenosis)  patient has multiple sclerosis     Multiple sclerosis ([facility] [address] )            Manuals     STM jaw, suboccipitals, face, neck  perf perf perf  perf perf perf perf    [place] internal STM  perf perf perf  perf perf  perf                              Neuro Re-Ed             scap retract HEP HEP  10x  10x 15x  20x    Chin tuck HEP HEP  10x  10x 15x  20x    diagrammatic breathing  perf perf 10x  10x 15x  20x    Neurac cerv retract supine     2x5        Neurac cerv retract SDLY     2x5        Neurac scap retract supine     2x5        Neurac scap retract w depression sit     2x5        Ther Ex             clucking HEP HEP  10x     15x    Controlled opening HEP HEP  10x     15x                              Neurac thoracic ext sit     2x5                                               Ther Activity                                       Gait Training                                       Modalities

## 2021-08-23 NOTE — PROGRESS NOTES
PT Re-Evaluation     Today's date: 2021  Patient name: Marii Dumont  : 1970  MRN: [id number]  Referring provider: LARON Bloom  Dx:   Encounter Diagnosis     ICD-10-CM    1  Neck pain  M54 2    2  Facial pain  R51 9        Start Time: 1145  Stop Time: 1230  Total time in clinic (min): 45 minutes    Assessment  Assessment details: Patient is making good progress towards the goals of PT        Subjective Evaluation    History of Present Illness  Mechanism of injury: The pain is significantly less          Objective

## 2021-08-24 ENCOUNTER — HOSPITAL ENCOUNTER (OUTPATIENT)
Dept: RADIOLOGY | Facility: HOSPITAL | Age: 51
Discharge: HOME | End: 2021-08-24
Attending: ORTHOPAEDIC SURGERY
Payer: COMMERCIAL

## 2021-08-24 DIAGNOSIS — M25.532 PAIN IN LEFT WRIST: ICD-10-CM

## 2021-08-24 DIAGNOSIS — Z87.81 S/P ORIF (OPEN REDUCTION INTERNAL FIXATION) FRACTURE: ICD-10-CM

## 2021-08-24 DIAGNOSIS — Z98.890 S/P ORIF (OPEN REDUCTION INTERNAL FIXATION) FRACTURE: ICD-10-CM

## 2021-08-24 PROCEDURE — 76882 US LMTD JT/FCL EVL NVASC XTR: CPT

## 2021-08-26 ENCOUNTER — OFFICE VISIT (OUTPATIENT)
Dept: PHYSICAL THERAPY | Facility: CLINIC | Age: 51
End: 2021-08-26
Payer: COMMERCIAL

## 2021-08-26 DIAGNOSIS — M26.609 TMJ (TEMPOROMANDIBULAR JOINT SYNDROME): Primary | ICD-10-CM

## 2021-08-26 PROCEDURE — 97110 THERAPEUTIC EXERCISES: CPT | Performed by: PHYSICAL THERAPIST

## 2021-08-26 PROCEDURE — 97140 MANUAL THERAPY 1/> REGIONS: CPT | Performed by: PHYSICAL THERAPIST

## 2021-08-26 PROCEDURE — 97112 NEUROMUSCULAR REEDUCATION: CPT | Performed by: PHYSICAL THERAPIST

## 2021-08-26 NOTE — PROGRESS NOTES
Daily Note     Today's date: 2021  Patient name: Ara Del Rio  : 1970  MRN: [id number]  Referring provider: LARON Chacko  Dx:   Encounter Diagnosis     ICD-10-CM    1  TMJ (temporomandibular joint syndrome)  M26 609                   Subjective: Overall less pain      Objective: See treatment diary below      Assessment: Tolerated treatment well  Patient demonstrated fatigue post treatment and exhibited good technique with therapeutic exercises      Plan: Continue per plan of care        Precautions:   Diagnosis Date Comment Source   MS (congenital mitral stenosis)  patient has multiple sclerosis     Multiple sclerosis ([facility] [address] )            Manuals    STM jaw, suboccipitals, face, neck  perf perf perf  perf perf perf perf perf   [place] internal STM  perf perf perf  perf perf  perf perf                             Neuro Re-Ed             scap retract HEP HEP  10x  10x 15x  20x 20x   Chin tuck HEP HEP  10x  10x 15x  20x 20x   diagrammatic breathing  perf perf 10x  10x 15x  20x 20x   Neurac cerv retract supine     2x5        Neurac cerv retract SDLY     2x5        Neurac scap retract supine     2x5        Neurac scap retract w depression sit     2x5        Ther Ex             clucking HEP HEP  10x     15x 20x   Controlled opening HEP HEP  10x     15x 20x                             Neurac thoracic ext sit     2x5                                               Ther Activity                                       Gait Training                                       Modalities

## 2021-08-30 ENCOUNTER — OFFICE VISIT (OUTPATIENT)
Dept: OBGYN CLINIC | Facility: CLINIC | Age: 51
End: 2021-08-30
Payer: COMMERCIAL

## 2021-08-30 VITALS
BODY MASS INDEX: 34.36 KG/M2 | HEART RATE: 72 BPM | DIASTOLIC BLOOD PRESSURE: 86 MMHG | SYSTOLIC BLOOD PRESSURE: 138 MMHG | HEIGHT: 69 IN | WEIGHT: 232 LBS

## 2021-08-30 DIAGNOSIS — G56.03 CARPAL TUNNEL SYNDROME, BILATERAL: Primary | ICD-10-CM

## 2021-08-30 PROCEDURE — 99214 OFFICE O/P EST MOD 30 MIN: CPT | Performed by: ORTHOPAEDIC SURGERY

## 2021-08-30 RX ORDER — CEFAZOLIN SODIUM 2 G/50ML
2000 SOLUTION INTRAVENOUS ONCE
Status: CANCELLED | OUTPATIENT
Start: 2021-09-17 | End: 2021-08-30

## 2021-08-30 NOTE — H&P (VIEW-ONLY)
Assessment/Plan:  1  Carpal tunnel syndrome, bilateral  US MSK limited    Case request operating room: RELEASE CARPAL TUNNEL    PAT Covid Screening    Case request operating room: RELEASE CARPAL TUNNEL       I saw and examined the patient  A long discussion with the patient  She does have carpal tunnel syndrome based on exam   The shot I believe was diagnostic as well as therapeutic  Patient does not  Wish to have future injections  She wishes to proceed with surgery  Risks benefits alternatives were explained at length  Surgical and nonsurgical options were further discussed  I did explain to the patient I would like to confirm carpal tunnel syndrome prior to surgery and we did order ultrasound of bilateral carpal tunnel to confirm this  This be scheduled  We did go ahead and schedule day for surgery however the patient will undergo ultrasound prior to this  If ultrasound is having to be negative we will postpone surgery  I do feel patient does have carpal tunnel syndrome as she has symptomatology consistent with this as well as good relief from the injection  Patient will meet with our   All questions were answered  I will see the patient day of surgery if the ultrasound is positive  I will review the ultrasound prior to surgery  Subjective: Follow-up  Bilateral carpal tunnel syndrome    Patient ID: Amanuel Andrade is a 48 y o  female  HPI   patient presents for follow-up today for bilateral carpal tunnel some syndrome  She did undergo injections at last visit   In July which did   Provide good relief  She is very happy with the relief she retained from the shot  She is able to sleeve and the soreness about both wrists have resolved  She denies any numbness or tingling today  Review of Systems   Constitutional: Negative for chills, fever and unexpected weight change  HENT: Negative for hearing loss, nosebleeds and sore throat      Eyes: Negative for pain, redness and visual disturbance  Respiratory: Negative for cough, shortness of breath and wheezing  Cardiovascular: Negative for chest pain, palpitations and leg swelling  Gastrointestinal: Negative for abdominal pain, nausea and vomiting  Endocrine: Negative for polydipsia and polyuria  Genitourinary: Negative for dysuria and hematuria  Musculoskeletal:        See HPI   Skin: Negative for rash and wound  Neurological: Negative for dizziness, numbness and headaches  Psychiatric/Behavioral: Negative for decreased concentration and suicidal ideas  The patient is not nervous/anxious  Past Medical History:   Diagnosis Date    MS (congenital mitral stenosis)     patient has multiple sclerosis     [facility] ([facility] [address]        Past Surgical History:   Procedure Laterality Date    HYSTERECTOMY      WRIST SURGERY Left 2020       History reviewed  No pertinent family history  Social History     Occupational History    Not on file   Tobacco Use    Smoking status: Never Smoker    Smokeless tobacco: Never Used   Vaping Use    Vaping Use: Never used   Substance and Sexual Activity    Alcohol use:  Yes    Drug use: Never    Sexual activity: Not on file         Current Outpatient Medications:     aspirin 81 mg chewable tablet, , Disp: , Rfl:     azelastine (ASTELIN) 0 1 % nasal spray, 2 sprays into each nostril daily, Disp: , Rfl:     cyclobenzaprine (FLEXERIL) 10 mg tablet, Take 10 mg by mouth, Disp: , Rfl:     fexofenadine (ALLEGRA) 60 MG tablet, Take 60 mg by mouth daily, Disp: , Rfl:     fluticasone (FLONASE) 50 mcg/act nasal spray, 50 mcg into each nostril daily, Disp: , Rfl:     gabapentin (NEURONTIN) 100 mg capsule, Take 100 mg by mouth 2 (two) times a day, Disp: , Rfl:     levothyroxine 50 mcg tablet, Take 50 mcg by mouth daily, Disp: , Rfl:     meloxicam (MOBIC) 15 mg tablet, TAKE 1 TABLET BY MOUTH ONCE A DAY, Disp: 30 tablet, Rfl: 1    montelukast (SINGULAIR) 10 mg tablet, Take 10 mg by mouth daily, Disp: , Rfl:     omeprazole (PriLOSEC) 40 MG capsule, TAKE 1 CAPSULE (40 MG) BY MOUTH DAILY BEFORE BREAKFAST, Disp: , Rfl:     rosuvastatin (CRESTOR) 10 MG tablet, Take 10 mg by mouth daily, Disp: , Rfl:     cefdinir (OMNICEF) 300 mg capsule, take 1 capsule by mouth twice a day for 10 days (Patient not taking: Reported on 7/14/2021), Disp: , Rfl:     diazepam (Valium) 5 mg tablet, Take 1 tablet by mouth every 12 (twelve) hours as needed (Patient not taking: Reported on 7/14/2021), Disp: , Rfl:     HYDROCODONE-APAP-DIETARY PROD PO, Every 6 hours (Patient not taking: Reported on 7/14/2021), Disp: , Rfl:     mupirocin (BACTROBAN) 2 % ointment, Apply topically 3 (three) times a day for 7 days, Disp: 15 g, Rfl: 0    nitrofurantoin (MACROBID) 100 mg capsule, take 1 capsule by mouth every 12 hours for 7 days (Patient not taking: Reported on 7/14/2021), Disp: , Rfl:     phenazopyridine (PYRIDIUM) 200 mg tablet, take 1 tablet by mouth three times a day with meals for 3 days (Patient not taking: Reported on 7/14/2021), Disp: , Rfl:     predniSONE 10 mg tablet, 4 tabs for 3 days then 3 tabs for 3 days then 2 tabs for 3 days then one tab for 3 days (Patient not taking: Reported on 8/11/2021), Disp: 30 tablet, Rfl: 0    No Known Allergies    Objective:  Vitals:    08/30/21 1154   BP: 138/86   Pulse: 72       Body mass index is 34 26 kg/m²  Ortho Exam         Bilateral wrist   Positive Durkan's  Positive Tinel's  5/5 opposition strength   No atrophy    Physical Exam  Vitals and nursing note reviewed  Constitutional:       Appearance: She is well-developed  HENT:      Head: Normocephalic and atraumatic  Eyes:      General: No scleral icterus  Conjunctiva/sclera: Conjunctivae normal    Cardiovascular:      Rate and Rhythm: Normal rate  Pulmonary:      Effort: Pulmonary effort is normal  No respiratory distress  Musculoskeletal:      Cervical back: Normal range of motion and neck supple  Comments: As noted in HPI   Skin:     General: Skin is warm and dry  Neurological:      Mental Status: She is alert and oriented to person, place, and time     Psychiatric:         Behavior: Behavior normal

## 2021-08-30 NOTE — PROGRESS NOTES
Assessment/Plan:  1  Carpal tunnel syndrome, bilateral  US MSK limited    Case request operating room: RELEASE CARPAL TUNNEL    PAT Covid Screening    Case request operating room: RELEASE CARPAL TUNNEL       I saw and examined the patient  A long discussion with the patient  She does have carpal tunnel syndrome based on exam   The shot I believe was diagnostic as well as therapeutic  Patient does not  Wish to have future injections  She wishes to proceed with surgery  Risks benefits alternatives were explained at length  Surgical and nonsurgical options were further discussed  I did explain to the patient I would like to confirm carpal tunnel syndrome prior to surgery and we did order ultrasound of bilateral carpal tunnel to confirm this  This be scheduled  We did go ahead and schedule day for surgery however the patient will undergo ultrasound prior to this  If ultrasound is having to be negative we will postpone surgery  I do feel patient does have carpal tunnel syndrome as she has symptomatology consistent with this as well as good relief from the injection  Patient will meet with our   All questions were answered  I will see the patient day of surgery if the ultrasound is positive  I will review the ultrasound prior to surgery  Subjective: Follow-up  Bilateral carpal tunnel syndrome    Patient ID: Ara Del Rio is a 48 y o  female  HPI   patient presents for follow-up today for bilateral carpal tunnel some syndrome  She did undergo injections at last visit   In July which did   Provide good relief  She is very happy with the relief she retained from the shot  She is able to sleeve and the soreness about both wrists have resolved  She denies any numbness or tingling today  Review of Systems   Constitutional: Negative for chills, fever and unexpected weight change  HENT: Negative for hearing loss, nosebleeds and sore throat      Eyes: Negative for pain, redness and visual disturbance  Respiratory: Negative for cough, shortness of breath and wheezing  Cardiovascular: Negative for chest pain, palpitations and leg swelling  Gastrointestinal: Negative for abdominal pain, nausea and vomiting  Endocrine: Negative for polydipsia and polyuria  Genitourinary: Negative for dysuria and hematuria  Musculoskeletal:        See HPI   Skin: Negative for rash and wound  Neurological: Negative for dizziness, numbness and headaches  Psychiatric/Behavioral: Negative for decreased concentration and suicidal ideas  The patient is not nervous/anxious  Past Medical History:   Diagnosis Date    MS (congenital mitral stenosis)     patient has multiple sclerosis     Multiple sclerosis ([facility] [address]        Past Surgical History:   Procedure Laterality Date    HYSTERECTOMY      WRIST SURGERY Left 2020       History reviewed  No pertinent family history  Social History     Occupational History    Not on file   Tobacco Use    Smoking status: Never Smoker    Smokeless tobacco: Never Used   Vaping Use    Vaping Use: Never used   Substance and Sexual Activity    Alcohol use:  Yes    Drug use: Never    Sexual activity: Not on file         Current Outpatient Medications:     aspirin 81 mg chewable tablet, , Disp: , Rfl:     azelastine (ASTELIN) 0 1 % nasal spray, 2 sprays into each nostril daily, Disp: , Rfl:     cyclobenzaprine (FLEXERIL) 10 mg tablet, Take 10 mg by mouth, Disp: , Rfl:     fexofenadine (ALLEGRA) 60 MG tablet, Take 60 mg by mouth daily, Disp: , Rfl:     fluticasone (FLONASE) 50 mcg/act nasal spray, 50 mcg into each nostril daily, Disp: , Rfl:     gabapentin (NEURONTIN) 100 mg capsule, Take 100 mg by mouth 2 (two) times a day, Disp: , Rfl:     levothyroxine 50 mcg tablet, Take 50 mcg by mouth daily, Disp: , Rfl:     meloxicam (MOBIC) 15 mg tablet, TAKE 1 TABLET BY MOUTH ONCE A DAY, Disp: 30 tablet, Rfl: 1    montelukast (SINGULAIR) 10 mg tablet, Take 10 mg by mouth daily, Disp: , Rfl:     omeprazole (PriLOSEC) 40 MG capsule, TAKE 1 CAPSULE (40 MG) BY MOUTH DAILY BEFORE BREAKFAST, Disp: , Rfl:     rosuvastatin (CRESTOR) 10 MG tablet, Take 10 mg by mouth daily, Disp: , Rfl:     cefdinir (OMNICEF) 300 mg capsule, take 1 capsule by mouth twice a day for 10 days (Patient not taking: Reported on 7/14/2021), Disp: , Rfl:     diazepam (Valium) 5 mg tablet, Take 1 tablet by mouth every 12 (twelve) hours as needed (Patient not taking: Reported on 7/14/2021), Disp: , Rfl:     HYDROCODONE-APAP-DIETARY PROD PO, Every 6 hours (Patient not taking: Reported on 7/14/2021), Disp: , Rfl:     mupirocin (BACTROBAN) 2 % ointment, Apply topically 3 (three) times a day for 7 days, Disp: 15 g, Rfl: 0    nitrofurantoin (MACROBID) 100 mg capsule, take 1 capsule by mouth every 12 hours for 7 days (Patient not taking: Reported on 7/14/2021), Disp: , Rfl:     phenazopyridine (PYRIDIUM) 200 mg tablet, take 1 tablet by mouth three times a day with meals for 3 days (Patient not taking: Reported on 7/14/2021), Disp: , Rfl:     predniSONE 10 mg tablet, 4 tabs for 3 days then 3 tabs for 3 days then 2 tabs for 3 days then one tab for 3 days (Patient not taking: Reported on 8/11/2021), Disp: 30 tablet, Rfl: 0    No Known Allergies    Objective:  Vitals:    08/30/21 1154   BP: 138/86   Pulse: 72       Body mass index is 34 26 kg/m²  Ortho Exam         Bilateral wrist   Positive Durkan's  Positive Tinel's  5/5 opposition strength   No atrophy    Physical Exam  Vitals and nursing note reviewed  Constitutional:       Appearance: She is well-developed  HENT:      Head: Normocephalic and atraumatic  Eyes:      General: No scleral icterus  Conjunctiva/sclera: Conjunctivae normal    Cardiovascular:      Rate and Rhythm: Normal rate  Pulmonary:      Effort: Pulmonary effort is normal  No respiratory distress  Musculoskeletal:      Cervical back: Normal range of motion and neck supple  Comments: As noted in HPI   Skin:     General: Skin is warm and dry  Neurological:      Mental Status: She is alert and oriented to person, place, and time     Psychiatric:         Behavior: Behavior normal

## 2021-08-31 ENCOUNTER — OFFICE VISIT (OUTPATIENT)
Dept: PHYSICAL THERAPY | Facility: CLINIC | Age: 51
End: 2021-08-31
Payer: COMMERCIAL

## 2021-08-31 DIAGNOSIS — H92.01 OTALGIA, RIGHT: Primary | ICD-10-CM

## 2021-08-31 PROCEDURE — 97110 THERAPEUTIC EXERCISES: CPT | Performed by: PHYSICAL THERAPIST

## 2021-08-31 PROCEDURE — 97112 NEUROMUSCULAR REEDUCATION: CPT | Performed by: PHYSICAL THERAPIST

## 2021-08-31 PROCEDURE — 97140 MANUAL THERAPY 1/> REGIONS: CPT | Performed by: PHYSICAL THERAPIST

## 2021-08-31 NOTE — PROGRESS NOTES
Daily Note     Today's date: 2021  Patient name: Bety Olivarez  : 1970  MRN: [id number]  Referring provider: LARON Chacon  Dx:   Encounter Diagnosis     ICD-10-CM    1  Otalgia, right  H92 01                   Subjective: I am having less headache, less pain in neck  Objective: See treatment diary below      Assessment: Tolerated treatment well  Patient demonstrated fatigue post treatment and exhibited good technique with therapeutic exercises      Plan: Continue per plan of care        Precautions:   Diagnosis Date Comment Source   MS (congenital mitral stenosis)  patient has multiple sclerosis     [facility]           Precautions standard       Manuals        STM c-spine perf       STM mast intra/extra perf                       Neuro Re-Ed         Posture instruct perf       D breathing perf                                               Ther Ex        scap retract 10x       Axial ext 10x       Clucking 10x       Controled opening 10x                                       Ther Activity                        Gait Training                        Modalities

## 2021-09-01 DIAGNOSIS — G89.29 CHRONIC GENERALIZED PAIN: ICD-10-CM

## 2021-09-01 DIAGNOSIS — R52 CHRONIC GENERALIZED PAIN: ICD-10-CM

## 2021-09-01 RX ORDER — MELOXICAM 15 MG/1
TABLET ORAL
Qty: 30 TABLET | Refills: 0 | Status: SHIPPED | OUTPATIENT
Start: 2021-09-01 | End: 2021-10-20 | Stop reason: HOSPADM

## 2021-09-03 ENCOUNTER — APPOINTMENT (OUTPATIENT)
Dept: RADIOLOGY | Facility: AMBULARY SURGERY CENTER | Age: 51
End: 2021-09-03
Attending: ORTHOPAEDIC SURGERY
Payer: COMMERCIAL

## 2021-09-03 ENCOUNTER — OFFICE VISIT (OUTPATIENT)
Dept: OBGYN CLINIC | Facility: CLINIC | Age: 51
End: 2021-09-03
Payer: COMMERCIAL

## 2021-09-03 ENCOUNTER — APPOINTMENT (OUTPATIENT)
Dept: PHYSICAL THERAPY | Facility: CLINIC | Age: 51
End: 2021-09-03
Payer: COMMERCIAL

## 2021-09-03 VITALS
BODY MASS INDEX: 34.8 KG/M2 | DIASTOLIC BLOOD PRESSURE: 87 MMHG | SYSTOLIC BLOOD PRESSURE: 142 MMHG | HEART RATE: 82 BPM | WEIGHT: 235 LBS | HEIGHT: 69 IN

## 2021-09-03 DIAGNOSIS — M21.41 PES PLANOVALGUS, ACQUIRED, RIGHT: ICD-10-CM

## 2021-09-03 DIAGNOSIS — S86.111A: ICD-10-CM

## 2021-09-03 DIAGNOSIS — S86.111A: Primary | ICD-10-CM

## 2021-09-03 PROBLEM — M66.871 NONTRAUMATIC RUPTURE OF RIGHT POSTERIOR TIBIAL TENDON: Status: ACTIVE | Noted: 2021-09-03

## 2021-09-03 PROBLEM — M66.871 NONTRAUMATIC RUPTURE OF RIGHT POSTERIOR TIBIAL TENDON: Status: RESOLVED | Noted: 2021-09-03 | Resolved: 2021-09-03

## 2021-09-03 PROCEDURE — 73600 X-RAY EXAM OF ANKLE: CPT

## 2021-09-03 PROCEDURE — 99213 OFFICE O/P EST LOW 20 MIN: CPT | Performed by: ORTHOPAEDIC SURGERY

## 2021-09-03 PROCEDURE — 73630 X-RAY EXAM OF FOOT: CPT

## 2021-09-03 RX ORDER — CHLORHEXIDINE GLUCONATE 4 G/100ML
SOLUTION TOPICAL DAILY PRN
Status: CANCELLED | OUTPATIENT
Start: 2021-09-03

## 2021-09-03 NOTE — PATIENT INSTRUCTIONS
Flatfoot Surgical Correction     What is adult flatfoot? Adult flatfoot is a condition that causes flattening of the arch of the foot  ?? X-ray views of a flatfoot before and after surgery  This patient had a first tarsal-metatarsal fusion, a medializing calcaneal osteotomy and a lateral column lengthening             What are the goals of flatfoot surgical correction? The goal of surgical correction is to improve alignment of the foot  This allows for more normal pressures during standing and walking  A combination of procedures is performed to repair the ligaments and tendons that support the arch  Bone cuts are often made to help restore the arch  Proper correction of flatfoot deformity can often help to improve pain and walking ability       What signs indicate surgery may be needed? Patients with flatfoot frequently describe ankle pain and difficulty with daily activities  Surgical reconstruction of the flatfoot is performed in patients with an arch collapse that is still flexible (not stiff)  An orthopaedic foot and ankle surgeon should do a complete evaluation of the foot  This includes a medical history, physical exam and  X-rays  A trial of non-operative treatment should be completed prior to any decision to have surgery  Treatments can include rest, immobilization, shoe inserts, braces and physical therapy  If these are unsuccessful, surgery can be considered      When should I avoid surgery? Patients who have diabetes or take oral steroids should be evaluated by their primary care physician  These conditions may prevent you from being able to safely have surgery  Obese patients and smokers are at higher risk for blood clots and wound problems  Full recovery from flatfoot surgery can take up to a year   Patients who are unable or unwilling to complete this process should not have this surgery      General Details of Procedure  A combination of surgical procedures can be used to reconstruct the flatfoot  Generally, these procedures can be  into those that correct deformities of the bones and those that repair ligaments and tendons  Your orthopaedic surgeon will choose the proper combination of procedures for your foot      Surgery of the foot can be performed under regional anesthesia, which is numbing the foot and ankle with a nerve or spinal block, or general anesthesia, which may require a breathing tube  A nerve block is often placed behind the knee to reduce pain after surgery      Specific Techniques  Medializing Calcaneal Osteotomy  A medializing calcaneal osteotomy (heel slide) procedure is often used when the calcaneus (heel bone) has shifted out from underneath the leg  An incision is made on the outside of the heel, and the back half of the heel bone is cut and slid back underneath the leg  The heel is then fixed in place using metal screws or a plate      Lateral Column Lengthening  Outward rotation of the foot may occur in patients with flatfoot  A lateral column lengthening procedure is sometimes performed for these patients  An incision is made on the outside of the foot, and the front half of the heel bone is cut  A bone wedge is then placed into the cut area of the heel bone  This wedge helps to lengthen the heel bone and rotate the foot back into its correct position  The wedge is usually kept in place using screws or a plate  The wedge can be taken from a cadaver or from a patients own hip      Medial Cuneiform Dorsal Opening Wedge Osteotomy or First Tarsal-Metatarsal Fusion  Arch collapse can lead to the big toe side of the foot being raised above the ground  Your surgeon may perform a dorsal       X-ray views of a flatfoot before and after surgery  This patient had a first tarsal-metatarsal fusion, a medializing calcaneal osteotomy and a lateral column lengthening      opening wedge osteotomy of the medial cuneiform bone to treat this problem   An alternative is to perform a first tarsal-metatarsal joint fusion  Both procedures involve an incision over the top of the foot  In the case of the dorsal opening wedge osteotomy, a bone wedge is placed into the top portion of the bone to push it down toward the floor  In the case of the fusion, the bone is pushed down toward the floor at the level of a joint in the middle of the foot and the bones are fused into that position  Screws or a plate can be used to keep the wedge in place or to fuse the joint      Tendon and Ligament Procedures  The posterior tibial tendon runs underneath the arch of the foot  It is often stretched and dysfunctional in patients with flatfoot  The tendon often requires removal if it is thickened or torn  Usually the tendon that bends the little toes can be transferred (rerouted) to help support the arch  The stresses placed on the flatfoot can lead to tearing of the ligaments that support the arch (spring ligament) and the inside of the ankle (deltoid ligament)  Your surgeon may decide to repair these structures if significant damage has been done  Finally, the flatfoot condition is often associated with tightness of the Achilles tendon  This can be treated using a lengthening procedure to stretch the muscle fibers of the calf       Double or Triple Arthrodesis  In the later stages of flatfoot, deformities are frequently inflexible (stiff)  Arthritis of the foot may be present as well  Surgical correction of these severe cases requires fusion of one or more of the foot joints  This procedure is referred to as a double or triple arthrodesis depending on the number of joints fused  For more information, see the Triple Arthrodesis page      What happens after surgery? Patients may go home the day of surgery or they may require an overnight hospital stay  The leg will be placed in a splint or cast and should be kept elevated for the first two weeks  At that point, sutures are removed   A new cast or a removable boot is then placed  It is important that patients do not put any weight on the corrected foot for six to eight weeks following the operation  Patients may begin bearing weight at eight weeks and usually progress to full weightbearing by 10 to 12 weeks  For some patients, weightbearing requires additional time  After 12 weeks, patients commonly can transition to wearing a shoe  Inserts and ankle braces are often used  Physical therapy may be recommended      Potential Complications  There are complications that relate to surgery in general  These include the risks associated with anesthesia, infection, damage to nerves and blood vessels, and bleeding or blood clots      Complications following flatfoot surgery may include wound breakdown or nonunion (incomplete healing of the bones)  These complications often can be prevented with proper wound care and rehabilitation  Occasionally, patients may notice some discomfort due to prominent hardware  Removal of hardware can be done at a later time if this is an issue  The overall complication rates for flatfoot surgery are low      Frequently Asked Questions  Will surgical correction of my flatfoot improve the cosmetic appearance of my foot? Surgical correction of flatfoot is aimed primarily at reducing pain and restoring function  Although surgery will likely improve the cosmetic appearance of the foot, it is not one of the primary goals of treatment      What activities will I be able to do following flatfoot surgery? With proper correction and rehabilitation, many patients return to active lifestyles  Activities such as walking, biking, driving and even golfing are well tolerated  It is less likely, however, that patients will be able to participate in very strenuous activities requiring running, cutting or jumping  GIGI Garsia  [facility], [address] [place] [address] Phone: [phone] ?  Fax: 312 [facility] [address] Office Phone: [phone] [facility]:[phone]    : Antonio Melo) [place], [place]     Surgery Coordinators [facility]: [facility], [address] [facility], [phone]  Surgery Coordinator [place]:  Melinda Didi [address], [phone]  www [email or web address]   PRE-OPERATIVE AND POST-OPERATIVE INSTRUCTIONS    General Information:   Your surgery is with Dr Irina Sexton  Dates can change (although rare) depending on emergencies   Typical post operative visits are at the following intervals:  3 weeks post surgery(except 1 week for bunions and wound monitoring), 6 weeks post surgery, 3 months post surgery, 6 months post surgery, and then on a yearly basis  However, this may change based on Dr Gonzalez Valery recommendation   #1 post-operative rule for foot/ankle surgery:  ONCE YOU ARE OUT OF YOUR CAST AND/OR REMOVABLE BOOT, SWELLING MAY PERSIST FOR MANY MONTHS  YOU MIGHT ALSO EXPERIENCE A BLUISH DISCOLORATION OF YOUR LEG  THIS IS NORMAL AND PART OF THE USUAL POSTOPERATIVE EXPERIENCE  SMOKING:   Smoking results in incomplete healing of fractures (broken bones) and joints that my have been fused  Smoking and nicotine also prevents the growth of bone into ankle replacements and bone healing  It also slows the healing of muscles and skin (soft tissue)  Therefore, please do not have surgery if you continue to smoke  We reserve the right to cancel your surgery if we suspect that you are smoking  DO NOT use nicorette gum or other patches  Please find an alternative method to quit smoking before your surgery  Pre-Operative Information:   Surgery date and preoperative visits:  a   If you have medical problems, such as an abnormal EKG, history of BLOOD CLOT, ANEURYSM, and any other heart condition, please inform us so that we can get your medical clearance several weeks before the surgery  Please bring any important medical information, such as an EKG, chest x-ray, or echocardiogram, with you to ensure that your surgery will not be delayed  b  If needed, you will receive your preoperative appointments in the mail or by phone from our scheduling office  The location of the preoperative appointment will be given to you also   c  You may not eat after midnight the night before surgery  If you do, your surgery will be cancelled  d   Laura Mascorro will receive a phone call from your surgery center the day before your surgery (if your surgery is on a Monday, you will get a call the Friday before)  If you do not hear from someone by 4pm the day before your surgery, please call the Surgical coordinator (number above) to notify us   e  Start taking Vitamin D3 4000 units per day and Calcium 1200mg per day immediately  You will continue this until your 3 month post-op visit  These are over the counter and available at all pharmacies and supermarkets  f  FOR THOSE HAVING SURGERY AT [address] [place] [address] WILL NEED CRUTCHES OR A ROLLING WALKER AFTER SURGERY, ASK FOR A PRESCRIPTION FOR THIS FROM OUR OFFICE TODAY  THIS CANNOT BE HANDLED THE DAY OF SURGERY AS [facility] DOES NOT STOCK THESE   Because bacterial can often enter any defect in the skin, it is important to avoid any cuts before surgery  Any breaks in the skin on the leg will often result in your surgery being postponed  Please avoid going on a very long walk the day prior to surgery, or doing other activities that could lead to irritation of the skin, including yard work, extra athletic activity, or shaving  This could result in surgery cancellation   You MUST be fasting the day of your surgery  Therefore, please do not consume any foot or beverage after midnight the night before surgery  The morning of surgery you may take your usual medications with a sip of water     It is important not to take anti-inflammatory medication like Ibuprofen, Motrin, Naproxen (Aleve), or Aspirin 7-10 days before surgery because they will make you bleed more than usual   Vitamin, E, Plavix and Coumadin also have the same effect  Stop Aspirin and Vitamin E two weeks before surgery  YOUR MEDICAL DOCTOR SHOULD TELL YOU WHEN TO STOP COUMADIN OR PLAVIX   If your surgery involves any bone healing, please do not take anti-inflammatories for at least 6 weeks after surgery  This can impede bone healing (ibuprofen, Aleve, Relafen, iodine)  Tylenol is fine to take  PREOPERATIVE BATHING INSTRUCTIONS:     Before your surgery, bathe with Hibiclens (4% Chlorhexidene) as instructed below  This skin cleanser will help reduce the bacteria on your skin before surgery  To avoid irritating your eyes, do not apply Hibiclens above the level of your neck   o On the evening before AND the morning of surgery, bathe your entire body except the face and scalp, then rinse freely  o DO NOT apply to your face or scalp, as Hibiclens can irritate your eyes   Purchasing information:   Hibiclens is available without a prescription at [facility]  ADDITIONAL INSTRUCTIONS:  PATIENTS HAVING FOOT/ANKLE SURGERY     In preparation for your upcoming surgery, we kindly request and advise the following:   Notify our office if you are taking any of the following:  Coumadin (warfarin):  Persantine (dipyridamole); Pletal (cilostazol); Plavix (clopidogrel); Ticlid (ticlopidine); Agrylin (anagrelide); Aggrenox (dipyridamole and aspirin) or other blood thinners,   In addition, stop taking Vitamin E and herbal supplements   Do not schedule any elective dental work for at least 6 months after surgery  If you had an ankle replacement, you will need to take antibiotics before any future dental procedures  Your dentist or our office can prescribe these for you    1000mg of Amoxicillin 1 hour prior to any dental procedure is the recommended dosing  THREE RULES:    1  After surgery you will most likely be given the instructions KEEP YOUR TOES ABOVE YOUR NOSE    This means that you MUST have your feet elevated higher than your heart  Keeping your toes above your nose helps to heal the muscles and skin (soft tissues) by reducing swelling in your leg  This position also helps to prevent infection, and is very important in avoiding deep venous thrombosis (blood clots)  2  In order to keep the blood circulating in your legs and in order to avoid deep vein   thrombosis (blood clots), we ask patients to GET UP ONCE AN HOUR during the day  This means you should at least cross the room and come back  It does not mean you have to be up for long periods of time  In most cases we will not have people immediately put any weight on their operated part  This is important to prevent loosening of metal or other devices holding the bones together  It also prevents irritation of the soft tissues which can lead to prolonged healing  When we say get up once an hour, please walk, hop or move with an assisted device  This is important! 3  Do not do any excessive walking during the first few days after surgery  Recovering from surgery is a full-time task for the patient  Postoperative care is important to avoid irritating the skin incision, which can lead to infection  Please do not plan activities or go out of town for several weeks after surgery  If you are unsure about your future activities, please schedule surgery only when you know it is acceptable for you  Scheduling surgery and then canceling the date, prevents other people from having surgery on that date as it takes time to line everything up effectively  If you cancel your surgery the week of your planned surgery, we reserve the right to cancel all future surgical procedures  THE DAY OF SURGERY:     Arrival to the hospital or outpatient surgical center on time is imperative    If you arrive late, then your surgery will be cancelled  You MUST have a family member/friend bring you, stay with you throughout the DURATION of your surgery, and drive you home   You MUST be fasting the day of your surgery  Therefore, do not consume any food or beverage after midnight the night before surgery  At your pre-operative visit with the anesthesia staff, or during your phone screen, a nurse will instruct you what medications you will need to take the day of surgery   MAKE SURE THAT THE PHARMACY LISTED IN THE ELECTRONIC MEDICAL RECORD (EPIC) IS YOUR PREFERRED PHARMACY  For example, if you are staying with family or a friend, and will not be near your preferred pharmacy, YOU MUST, tell the nurses checking you in the day of surgery so that this can be changed in the system  If your prescriptions are sent to a pharmacy, this cannot be changed  AFTER YOUR SURGERY:   Bleeding through the bandage almost always occurs  Do not let this alarm you  Simply add more gauze or a towel, call us, and come in for a dressing change  If you think it is excessive, contact us immediately or go to the local emergency room   Do not get the bandage wet  Showering is possible with plastic protectors  Be very careful, as the bathroom can be wet and slippery  If you do get your dressing wet, it should be changed immediately  Please contact us   ONCE YOUR ARE OUT OF YOUR CAST AND/OR REMOVABLE BOOT, SWELLING MAY PERSIST FOR MANY MONTHS  YOU MIGHT ALSO EXPERIENCE A BLUISH DISCOLORATION OF YOUR LEG  THIS IS NORMAL AND PART OF THE USUAL POSTOPERATIVE EXPERIENCE  WEARING COMPRESSION HOSE (ELASTIC STOCKINGS) CAN HELP AVOID SOME OF THIS SWELLING  DRESSING:   The purpose of the surgical dressing is to keep your wound and the surgical site protected from the environment    Most dressings contain splints, which help to hold your foot and ankle in a corrected position, and also allow the surgical site to heal properly  Dressings will remain in place and undisturbed until the first postop visit  If you have a drain in place, this will need to be removed in 1-3 days after surgery  The time for the drain to be pulled will be written on your discharge instruction sheet  CAST  INSTRUCTIONS:  You may or may not get a cast following surgery  If you do, pay close attention to the following:     After application of a splint or cast, it is very important to elevate your leg for 24 to 72 hours  The injured area should be elevated well above the heart  Remember Toes above your Nose  Rest and elevation greatly reduce pain and speed the healing process by minimizing early swelling  CALL YOUR DOCTORS OFFICE OR VISIT LOCATION EMERGENCY ROOM IF YOU HAVE ANY OF THE FOLLOWING:     Significant increased pain, which may be caused by swelling, and the feeling that the splint or cast is too tight   Numbness and tingling in your hand or foot, which may be caused by too much pressure on the nerves   Burning and stinging, which may be caused by too much pressure on the skin   Excessive swelling below the cast, which may mean the cast is slowing your blood circulation   Loss of active movement of toes, which request an urgent evaluation   Loss of capillary refill  Pinch the tip of toes and everette the skin  Release pressure and if the skin does not return pink then call the office immediately  DO NOT GET YOUR CAST WET  Bacteria thrive in moist dark areas  We do not want this  If your cast becomes wet, return to the office and we will apply another one  PAIN AFTER SURGERY:  Narcotic pain medication can and will depress your respiratory system if taken in excess  The goal of pain management with narcotics is to be comfortable not pain free  If you take enough narcotics to be pain free then you run the risk of stopping breathing  If this happens, call 911 immediately!    Pain in the heel is often  caused by pressure from the weight of your foot on the bed  Make sure your heel is suspended off the bed by keeping a pillow underneath your calf not your knee  Medications: You will be given narcotic pain medication  Do NOT drive while taking narcotic medications  Medications such as Darvocet, Percocet, Vicoden or Tylenol #3, also contain acetaminophen (Tylenol)  Do not take acetaminophen or Tylenol from home when taking theses medications  When you fill your prescription, you may ask the pharmacist if your pain medication has acetaminophen/Tylenol in it  It is okay to take Tylenol with Oxycontin/Oxycodone  Should you have pain after taking your prescription medication, ibuprophen (Motrin, Advil, and Alleve) is a common over the counter preparation and may often be taken with the prescription pain medication as long as you take them with food  These medications can irritate the stomach lining  Unless you are allergic to aspirin or currently taking a blood thinner, Dr Yael Poe patients are requested to take one 325 mg aspirin every 12 hours until you are back to walking normally after surgery (This can be up to 6 weeks)  Narcotic medications commonly cause nausea  Taking them with food will decrease this side effect  If you are having extreme nausea, please contact us for an alternative medication or for something that can be taken with this medication to decrease the nausea  Also, narcotic medications frequently cause constipation  An increase of fiber, fruits and vegetables in your diet may alleviate this problem, or if necessary, you may use an over-the-counter medication such as senekot, colace, or Fibercon for constipation problems  You should resume all medications you were taking prior to the surgery unless otherwise specified  Activity:   Because of your recent foot surgery, your activity level will decrease  You will need to elevate your foot ABOVE the level of your heart for a minimum of four days  The length of time necessary for the swelling to go down, and for your wounds to heal properly depends greatly on your efforts here  Elevation is extremely important to avoid compromising the blood supply to your foot  Remember when your foot is down it will swell, which will increase pain and slow healing  Wiggle your toes frequently if possible  If you go home with a regional block, (a type of anesthesia) the foot and leg will be numb  Think of ways to get into your house and around the house until the block wears off  Keep in mind that it may be a legal issue if you drive while in a cast or splint, especially when the splint is on the right foot  You may call the Department of Motor Vehicles to schedule a road test if you have adaptive equipment applied to your car  The amount of weight you are allowed to bear on your foot will be written on your discharge sheet filled out at the time of surgery  The following is an explanation of the possibilities:     Non-weight bearing: You are to put NO weight whatsoever on your foot  When using crutches or a walker, your foot should not touch the ground, except when you are standing  Then, it may rest on the ground  If you are to be non-weight bearing, and you are not compliant, you could compromise the surgery  Some of our patients have been requesting prescriptions for a roll-a-bout knee scooter  [employer] and other insurances have been denying these claims, and you may either have to rent one or pay out of pocket to purchase one  THIS SHOULD BE PURCHASED PRIOR TO THE SURGERY AND YOU SHOULD BRING IT WITH YOU THE DAY OF THE SURGERY TO AIDE IN GETTING FROM THE CAR INTO THE HOUSE AFTER SURGERY

## 2021-09-03 NOTE — PROGRESS NOTES
GIGI Gruber  Attending, Orthopaedic Surgery  Foot and [address] [facility] [address] [facility]        ORTHOPAEDIC FOOT AND ANKLE CLINIC VISIT     Assessment:     Encounter Diagnoses   Name Primary?  Traumatic rupture of posterior tibial tendon, right, initial encounter Yes    Pes planovalgus, acquired, right         Plan:   · The patient verbalized understanding of exam findings and treatment plan  We engaged in the shared decision-making process and treatment options were discussed at length with the patient  Surgical and conservative management discussed today along with risks and benefits  · She has a rupture of her PTT which may or may not have been associated with the trauma but is a complete rupture either way  To fix this problem you cant just simply repair the tendon of perform an FDL tendon transfer, you would need to realign the foot to protect the new tendon so it does not fail like the PTT did  · She has a Stage IIb flatfoot which is fully correctable  · She would require a FDL tendon transfer to the navicular, an achilles lengthening, a Cotton and MCO osteotomy with possible Wilson if necessary  · Patient report maximum medical improvement after this surgery on average at 11 months after the surgery  · She has both medial sided and lateral sided flat foot pain with subfibular impingement  · We consented her for the surgery  She has [place] Medicare and may not be able to have this procedure done in [place] which is where I operate  She may need to be referred to a foot and ankle surgeon in [place] for continued care  History of Present Illness:   Chief Complaint:   Chief Complaint   Patient presents with    Right Ankle - Pain, Numbness, Swelling     Deepak Murcia is a 48 y o  female who is being seen for right posterior tibialis tendon tear without injury  Patient reports that she sustained an injury in November in which she sustained a fall breaking her left distal radius    She believes that she may have torn her tendon during this injury  Patient does note that in her recovery for her wrist fracture that she did have right ankle pain as well  Patient reports that she did undergo physical therapy and bracing for the right ankle with minimal improvement  Patient does note that she has a history of flat feet which he wears orthotics which did provide her with improvement prior to her injury  Pain is localized at medial and lateral aspect of the ankle with minimal radiating and described as sharp and severe  Patient denies numbness, tingling or radicular pain  Denies history of neuropathy  Patient does not smoke, does not have diabetes and does take blood thinners  Patient denies family history of anesthesia complications and has not had any complications with anesthesia  Pain/symptom timing:  Worse during the day when active  Pain/symptom context:  Worse with activites and work  Pain/symptom modifying factors:  Rest makes better, activities make worse  Pain/symptom associated signs/symptoms: none    Prior treatment   · NSAIDsYes    · Injections No   · Bracing/Orthotics Yes   · Physical Therapy Yes     Orthopedic Surgical History:   See below    Past Medical, Surgical and Social History:  Past Medical History:  has a past medical history of MS (congenital mitral stenosis) and Multiple sclerosis ([facility] [address] )  Problem List: does not have any pertinent problems on file  Past Surgical History:  has a past surgical history that includes Wrist surgery (Left, 2020) and Hysterectomy  Family History: family history is not on file  Social History:  reports that she has never smoked  She has never used smokeless tobacco  She reports current alcohol use  She reports that she does not use drugs    Current Medications: has a current medication list which includes the following prescription(s): aspirin, azelastine, cyclobenzaprine, diazepam, fexofenadine, fluticasone, gabapentin, levothyroxine, meloxicam, montelukast, mupirocin, omeprazole, rosuvastatin, cefdinir, hydrocodone-apap-dietary prod, nitrofurantoin, phenazopyridine, and prednisone  Allergies: has No Known Allergies  Review of Systems:  General- denies fever/chills  HEENT- denies hearing loss or sore throat  Eyes- denies eye pain or visual disturbances, denies red eyes  Respiratory- denies cough or SOB  Cardio- denies chest pain or palpitations  GI- denies abdominal pain  Endocrine- denies urinary frequency  Urinary- denies pain with urination  Musculoskeletal- Negative except noted above  Skin- denies rashes or wounds  Neurological- denies dizziness or headache  Psychiatric- denies anxiety or difficulty concentrating    Physical Exam:   /87 (BP Location: Right arm, Patient Position: Sitting, Cuff Size: Adult)   Pulse 82   Ht 5' 9" (1 753 m)   Wt 107 kg (235 lb)   BMI 34 70 kg/m²   General/Constitutional: No apparent distress: well-nourished and well developed  Eyes: normal ocular motion  Cardio: RRR, Normal S1S2, No m/r/g  Lymphatic: No appreciable lymphadenopathy  Respiratory: Non-labored breathing, CTA b/l no w/c/r  Vascular: No edema, swelling or tenderness, except as noted in detailed exam   Integumentary: No impressive skin lesions present, except as noted in detailed exam   Neuro: No ataxia or tremors noted  Psych: Normal mood and affect, oriented to person, place and time  Appropriate affect  Musculoskeletal: Normal, except as noted in detailed exam and in HPI      Examination    Right    Gait Normal    Musculoskeletal Tender to palpation at subfibular region    Skin Normal       Nails Normal    Range of Motion  15 degrees dorsiflexion, 40 degrees plantarflexion  Subtalar motion: normal     Stability Stable    Muscle Strength 5/5 tibialis anterior  5/5 gastrocnemius-soleus  4/5 posterior tibialis  5/5 peroneal/eversion strength  5/5 EHL  5/5 FHL    Neurologic Normal    Sensation  Intact to light touch throughout sural, saphenous, superficial peroneal, deep peroneal and medial/lateral plantar nerve distributions  Leland-Marine 5 07 filament (10g) testing  deferred  Cardiovascular Brisk capillary refill < 2 seconds,intact DP and PT pulses    Special Tests Positive too many toes sign       Imaging Studies:   5 view of the right ankle were taken, reviewed and interpreted independently that demonstrate talar head uncovering on 40%, pes planovalgus  Reviewed by me personally  MRI imaging of the ankle were reviewed and interpreted independently that demonstrate complete tear of the posterior tibialis tear with pes planovalgus  Esmeralda Distel Lachman, MD  Foot & Ankle Surgery   Department of [address] [place] [address]      I personally performed the service  Esmeralda Distel Lachman, MD

## 2021-09-10 ENCOUNTER — OFFICE VISIT (OUTPATIENT)
Dept: PHYSICAL THERAPY | Facility: CLINIC | Age: 51
End: 2021-09-10
Payer: COMMERCIAL

## 2021-09-10 DIAGNOSIS — H93.11 RIGHT-SIDED TINNITUS: Primary | ICD-10-CM

## 2021-09-10 PROCEDURE — 97110 THERAPEUTIC EXERCISES: CPT | Performed by: PHYSICAL THERAPIST

## 2021-09-10 PROCEDURE — 97112 NEUROMUSCULAR REEDUCATION: CPT | Performed by: PHYSICAL THERAPIST

## 2021-09-10 PROCEDURE — 97140 MANUAL THERAPY 1/> REGIONS: CPT | Performed by: PHYSICAL THERAPIST

## 2021-09-10 RX ORDER — FAMOTIDINE 10 MG
10 TABLET ORAL
COMMUNITY

## 2021-09-10 NOTE — PRE-PROCEDURE INSTRUCTIONS
My Surgical Experience    The following information was developed to assist you to prepare for your operation  What do I need to do before coming to the hospital?   Arrange for a responsible person to drive you to and from the hospital    Arrange care for your children at home  Children are not allowed in the recovery areas of the hospital   Plan to wear clothing that is easy to put on and take off  If you are having shoulder surgery, wear a shirt that buttons or zippers in the front  Bathing  o Shower the evening before and the morning of your surgery with an antibacterial soap  Please refer to the Pre Op Showering Instructions for Surgery Patients Sheet   o Remove nail polish and all body piercing jewelry  o Do not shave any body part for at least 24 hours before surgery-this includes face, arms, legs and upper body  Food  o Nothing to eat or drink after midnight the night before your surgery  This includes candy and chewing gum  o Exception: If your surgery is after 12:00pm (noon), you may have clear liquids such as 7-Up®, ginger ale, apple or cranberry juice, Jell-O®, water, or clear broth until 8:00 am  o Do not drink milk or juice with pulp on the morning before surgery  o Do not drink alcohol 24 hours before surgery  Medicine  o Follow instructions you received from your surgeon about which medicines you may take on the day of surgery  o If instructed to take medicine on the morning of surgery, take pills with just a small sip of water  Call your prescribing doctor for specific infroamtion on what to do if you take insulin    What should I bring to the hospital?    Bring:  Annelise Delvalle or a walker, if you have them, for foot or knee surgery   A list of the daily medicines, vitamins, minerals, herbals and nutritional supplements you take   Include the dosages of medicines and the time you take them each day   Glasses, dentures or hearing aids   Minimal clothing; you will be wearing hospital sleepwear   Photo ID; required to verify your identity   If you have a Living Will or Power of , bring a copy of the documents   If you have an ostomy, bring an extra pouch and any supplies you use    Do not bring   Medicines or inhalers   Money, valuables or jewelry    What other information should I know about the day of surgery?  Notify your surgeons if you develop a cold, sore throat, cough, fever, rash or any other illness   Report to the Ambulatory Surgical/Same Day Surgery Unit   You will be instructed to stop at Registration only if you have not been pre-registered   Inform your  fi they do not stay that they will be asked by the staff to leave a phone number where they can be reached   Be available to be reached before surgery  In the event the operating room schedule changes, you may be asked to come in earlier or later than expected    *It is important to tell your doctor and others involved in your health care if you are taking or have been taking any non-prescription drugs, vitamins, minerals, herbals or other nutritional supplements  Any of these may interact with some food or medicines and cause a reaction      Pre-Surgery Instructions:   Medication Instructions    aspirin 81 mg chewable tablet Instructed patient per Anesthesia Guidelines   azelastine (ASTELIN) 0 1 % nasal spray Instructed patient per Anesthesia Guidelines   cyclobenzaprine (FLEXERIL) 10 mg tablet Instructed patient per Anesthesia Guidelines   diazepam (Valium) 5 mg tablet Instructed patient per Anesthesia Guidelines   famotidine (PEPCID) 10 mg tablet Instructed patient per Anesthesia Guidelines   fexofenadine (ALLEGRA) 60 MG tablet Instructed patient per Anesthesia Guidelines   fluticasone (FLONASE) 50 mcg/act nasal spray Instructed patient per Anesthesia Guidelines   gabapentin (NEURONTIN) 100 mg capsule Instructed patient per Anesthesia Guidelines      levothyroxine 50 mcg tablet Instructed patient per Anesthesia Guidelines   meloxicam (MOBIC) 15 mg tablet Instructed patient per Anesthesia Guidelines   montelukast (SINGULAIR) 10 mg tablet Instructed patient per Anesthesia Guidelines   omeprazole (PriLOSEC) 40 MG capsule Instructed patient per Anesthesia Guidelines   rosuvastatin (CRESTOR) 10 MG tablet Instructed patient per Anesthesia Guidelines  To take gabapentin, synthroid and omeprazole a m  of surgery

## 2021-09-10 NOTE — PROGRESS NOTES
PT Discharge    Today's date: 9/10/2021  Patient name: Pratima Brown  : 1970  MRN: [id number]  Referring provider: LARON Elmore  Dx:   Encounter Diagnosis     ICD-10-CM    1  Right-sided tinnitus  H93 11                   Assessment  Assessment details: Pratima Brown has been seen for Right-sided tinnitus  (primary encounter diagnosis),and has met the goals of physical therapy  And is independent with a HEP  Subjective Evaluation    History of Present Illness  Mechanism of injury: I have no facial  Pain, the headaches are 90% less          Objective            Precautions standard       Manuals 8/31 9/10      STM c-spine perf perf      STM mast intra/extra perf perf                      Neuro Re-Ed         Posture instruct perf perf      D breathing perf perf                                              Ther Ex        scap retract 10x 10x      Axial ext 10x 10x      Clucking 10x 10x      Controled opening 10x 10x                                      Ther Activity                        Gait Training                        Modalities

## 2021-09-11 PROCEDURE — U0003 INFECTIOUS AGENT DETECTION BY NUCLEIC ACID (DNA OR RNA); SEVERE ACUTE RESPIRATORY SYNDROME CORONAVIRUS 2 (SARS-COV-2) (CORONAVIRUS DISEASE [COVID-19]), AMPLIFIED PROBE TECHNIQUE, MAKING USE OF HIGH THROUGHPUT TECHNOLOGIES AS DESCRIBED BY CMS-2020-01-R: HCPCS | Performed by: ORTHOPAEDIC SURGERY

## 2021-09-11 PROCEDURE — U0005 INFEC AGEN DETEC AMPLI PROBE: HCPCS | Performed by: ORTHOPAEDIC SURGERY

## 2021-09-13 ENCOUNTER — ANESTHESIA EVENT (OUTPATIENT)
Dept: PERIOP | Facility: HOSPITAL | Age: 51
End: 2021-09-13
Payer: COMMERCIAL

## 2021-09-13 PROBLEM — E03.9 HYPOTHYROIDISM: Status: ACTIVE | Noted: 2021-09-13

## 2021-09-13 PROBLEM — K21.9 GASTROESOPHAGEAL REFLUX DISEASE: Status: ACTIVE | Noted: 2021-09-13

## 2021-09-13 NOTE — ANESTHESIA PREPROCEDURE EVALUATION
Procedure:  RELEASE CARPAL TUNNEL (Right Wrist)    Relevant Problems   CARDIO   (+) Hyperlipidemia      ENDO   (+) Hypothyroidism      GI/HEPATIC   (+) Gastroesophageal reflux disease      Other   (+) Laryngopharyngeal reflux (LPR)   (+) Multiple sclerosis (HCC)   (+) TMJ (temporomandibular joint syndrome)        Physical Exam    Airway    Mallampati score: III  TM Distance: >3 FB  Neck ROM: full     Dental       Cardiovascular  Rhythm: regular, Rate: normal,     Pulmonary  Breath sounds clear to auscultation,     Other Findings        Anesthesia Plan  ASA Score- 2     Anesthesia Type- IV sedation with anesthesia with ASA Monitors  Additional Monitors:   Airway Plan:           Plan Factors-    Chart reviewed  Patient is not a current smoker  Induction- intravenous  Postoperative Plan-     Informed Consent- Anesthetic plan and risks discussed with patient  I personally reviewed this patient with the CRNA  Discussed and agreed on the Anesthesia Plan with the CRNA  Nakita Candelario

## 2021-09-16 ENCOUNTER — HOSPITAL ENCOUNTER (OUTPATIENT)
Dept: RADIOLOGY | Facility: HOSPITAL | Age: 51
Discharge: HOME | End: 2021-09-16
Attending: ORTHOPAEDIC SURGERY
Payer: COMMERCIAL

## 2021-09-16 DIAGNOSIS — G56.03 CARPAL TUNNEL SYNDROME, BILATERAL: ICD-10-CM

## 2021-09-16 PROCEDURE — 76882 US LMTD JT/FCL EVL NVASC XTR: CPT

## 2021-09-17 ENCOUNTER — ANESTHESIA (OUTPATIENT)
Dept: PERIOP | Facility: HOSPITAL | Age: 51
End: 2021-09-17
Payer: COMMERCIAL

## 2021-09-17 ENCOUNTER — HOSPITAL ENCOUNTER (OUTPATIENT)
Facility: HOSPITAL | Age: 51
Setting detail: OUTPATIENT SURGERY
Discharge: HOME | End: 2021-09-17
Attending: ORTHOPAEDIC SURGERY | Admitting: ORTHOPAEDIC SURGERY
Payer: COMMERCIAL

## 2021-09-17 VITALS
TEMPERATURE: 96.5 F | WEIGHT: 235.8 LBS | BODY MASS INDEX: 34.93 KG/M2 | OXYGEN SATURATION: 94 % | HEART RATE: 64 BPM | SYSTOLIC BLOOD PRESSURE: 137 MMHG | HEIGHT: 69 IN | DIASTOLIC BLOOD PRESSURE: 74 MMHG | RESPIRATION RATE: 18 BRPM

## 2021-09-17 DIAGNOSIS — G56.01 CARPAL TUNNEL SYNDROME OF RIGHT WRIST: Primary | ICD-10-CM

## 2021-09-17 PROBLEM — E78.5 HYPERLIPIDEMIA: Status: ACTIVE | Noted: 2021-09-17

## 2021-09-17 PROCEDURE — 64721 CARPAL TUNNEL SURGERY: CPT | Performed by: ORTHOPAEDIC SURGERY

## 2021-09-17 RX ORDER — FENTANYL CITRATE 50 UG/ML
INJECTION, SOLUTION INTRAMUSCULAR; INTRAVENOUS AS NEEDED
Status: DISCONTINUED | OUTPATIENT
Start: 2021-09-17 | End: 2021-09-17

## 2021-09-17 RX ORDER — PROPOFOL 10 MG/ML
INJECTION, EMULSION INTRAVENOUS AS NEEDED
Status: DISCONTINUED | OUTPATIENT
Start: 2021-09-17 | End: 2021-09-17

## 2021-09-17 RX ORDER — HYDROCODONE BITARTRATE AND ACETAMINOPHEN 5; 325 MG/1; MG/1
1 TABLET ORAL EVERY 6 HOURS PRN
Qty: 15 TABLET | Refills: 0 | Status: SHIPPED | OUTPATIENT
Start: 2021-09-17 | End: 2021-09-27

## 2021-09-17 RX ORDER — ONDANSETRON 2 MG/ML
INJECTION INTRAMUSCULAR; INTRAVENOUS AS NEEDED
Status: DISCONTINUED | OUTPATIENT
Start: 2021-09-17 | End: 2021-09-17

## 2021-09-17 RX ORDER — SODIUM CHLORIDE, SODIUM LACTATE, POTASSIUM CHLORIDE, CALCIUM CHLORIDE 600; 310; 30; 20 MG/100ML; MG/100ML; MG/100ML; MG/100ML
75 INJECTION, SOLUTION INTRAVENOUS CONTINUOUS
Status: DISCONTINUED | OUTPATIENT
Start: 2021-09-17 | End: 2021-09-17 | Stop reason: HOSPADM

## 2021-09-17 RX ORDER — FENTANYL CITRATE/PF 50 MCG/ML
25 SYRINGE (ML) INJECTION
Status: DISCONTINUED | OUTPATIENT
Start: 2021-09-17 | End: 2021-09-17 | Stop reason: HOSPADM

## 2021-09-17 RX ORDER — KETOROLAC TROMETHAMINE 30 MG/ML
INJECTION, SOLUTION INTRAMUSCULAR; INTRAVENOUS AS NEEDED
Status: DISCONTINUED | OUTPATIENT
Start: 2021-09-17 | End: 2021-09-17

## 2021-09-17 RX ORDER — ONDANSETRON 2 MG/ML
4 INJECTION INTRAMUSCULAR; INTRAVENOUS ONCE AS NEEDED
Status: DISCONTINUED | OUTPATIENT
Start: 2021-09-17 | End: 2021-09-17 | Stop reason: HOSPADM

## 2021-09-17 RX ORDER — KETAMINE HCL IN NACL, ISO-OSM 100MG/10ML
SYRINGE (ML) INJECTION AS NEEDED
Status: DISCONTINUED | OUTPATIENT
Start: 2021-09-17 | End: 2021-09-17

## 2021-09-17 RX ORDER — CEFAZOLIN SODIUM 2 G/50ML
2000 SOLUTION INTRAVENOUS ONCE
Status: COMPLETED | OUTPATIENT
Start: 2021-09-17 | End: 2021-09-17

## 2021-09-17 RX ORDER — DEXAMETHASONE SODIUM PHOSPHATE 4 MG/ML
INJECTION, SOLUTION INTRA-ARTICULAR; INTRALESIONAL; INTRAMUSCULAR; INTRAVENOUS; SOFT TISSUE AS NEEDED
Status: DISCONTINUED | OUTPATIENT
Start: 2021-09-17 | End: 2021-09-17

## 2021-09-17 RX ORDER — MAGNESIUM HYDROXIDE 1200 MG/15ML
LIQUID ORAL AS NEEDED
Status: DISCONTINUED | OUTPATIENT
Start: 2021-09-17 | End: 2021-09-17 | Stop reason: HOSPADM

## 2021-09-17 RX ORDER — PROPOFOL 10 MG/ML
INJECTION, EMULSION INTRAVENOUS CONTINUOUS PRN
Status: DISCONTINUED | OUTPATIENT
Start: 2021-09-17 | End: 2021-09-17

## 2021-09-17 RX ADMIN — Medication 20 MG: at 13:08

## 2021-09-17 RX ADMIN — FENTANYL CITRATE 100 MCG: 50 INJECTION, SOLUTION INTRAMUSCULAR; INTRAVENOUS at 13:08

## 2021-09-17 RX ADMIN — SODIUM CHLORIDE, SODIUM LACTATE, POTASSIUM CHLORIDE, AND CALCIUM CHLORIDE: .6; .31; .03; .02 INJECTION, SOLUTION INTRAVENOUS at 13:00

## 2021-09-17 RX ADMIN — CEFAZOLIN SODIUM 2000 MG: 2 SOLUTION INTRAVENOUS at 13:00

## 2021-09-17 RX ADMIN — PROPOFOL 50 MG: 10 INJECTION, EMULSION INTRAVENOUS at 13:08

## 2021-09-17 RX ADMIN — LIDOCAINE HYDROCHLORIDE 50 MG: 20 INJECTION, SOLUTION INTRAVENOUS at 13:08

## 2021-09-17 RX ADMIN — PROPOFOL 50 MCG/KG/MIN: 10 INJECTION, EMULSION INTRAVENOUS at 13:00

## 2021-09-17 RX ADMIN — ONDANSETRON 4 MG: 2 INJECTION INTRAMUSCULAR; INTRAVENOUS at 13:31

## 2021-09-17 RX ADMIN — DEXAMETHASONE SODIUM PHOSPHATE 4 MG: 4 INJECTION, SOLUTION INTRA-ARTICULAR; INTRALESIONAL; INTRAMUSCULAR; INTRAVENOUS; SOFT TISSUE at 13:31

## 2021-09-17 RX ADMIN — KETOROLAC TROMETHAMINE 30 MG: 30 INJECTION, SOLUTION INTRAMUSCULAR at 13:31

## 2021-09-17 NOTE — INTERVAL H&P NOTE
H&P reviewed  I personally saw and examined the patient  After examining the patient I find no changes in the patients condition since the H&P had been written  Risks and benefits were again reviewed  She wished to proceed with right carpal tunnel release  There were no vitals filed for this visit

## 2021-09-17 NOTE — DISCHARGE INSTRUCTIONS
Dr Roseann Pisano Operative Instructions  Carpal Tunnel Release    DIET:   Resume prior diet  Start light and progress as tolerated  No alcoholic beverages on the day of surgery  DRESSING/ WOUND:   1  Bandage: Maintain dressing for 7 days  Band Aid and a thin layer of Neosporin thereafter  Keep your dressing clean and DRY  2  All patients may shower tomorrow  Cover your bandage with a plastic bag and use tape or a rubber band so that it is water tight  A small towel under the rubber band will help catch any water that leaks through  3  If a splint has been applied --do not make holes in it, remove it, or stick objects in it (i e  coat hangers, pencils)  4  Use a pillow as much as possible to protect the operative site as well as keep it elevated   5  Ice: Ice for 10 minutes every hour as needed for swelling x 24 hours  ACTIVITY:   1  Keep hand/wrist above the level of your heart at all times for the next 7 days  This should be accomplished by using a pillow  A sling will not hold your hand/wrist above your heart and therefore is inadequate (it also may cause shoulder and elbow stiffness)  2  Motion: Move fingers into a fist 5 times a day, DO NOT move any splinted fingers  3  Avoid activities which may re-injure your hand or finger  4  Move all joints of the extremity that are not immobilized (i e  shoulder, elbow, fingers, and thumb unless instructed otherwise) to prevent stiffness  5  Sling: No sling necessary  6  Weight bearing status: Avoid heavy lifting (>5 pounds) with the extremity that was operated on until follow up appointment  Normal activities of daily living are OK  PAIN MEDICINE:   1  Norco/Hydrocodone one tab every 6 hours AS NEEDED for pain  Take pain medicine on an AS NEEDED basis according to your doctor's instructions   If you had a nerve block and still have no pain before going to bed, great, but still take a pain pill so it does not wake you up in the middle of the night (this is what usually will happen)  2  Your pain will decrease over the next few days-- allowing you to:    Decrease your pain medicine quantity until you stop   Increase the time between doses until you stop  3  You should not drink alcoholic beverages or operate heavy machinery while on pain medication  4  Take pain medicine with food to prevent nausea  CONTACT PHYSICIAN FOR:   Slight pain, swelling and bluish discoloration are to be expected  If you have breathing difficulty or chest pain dial 911 immediately  However, if the following symptoms occur notify your physician:    Temperature above 101° F  Inability to urinate in 8 hours    Uncontrolled nausea/vomiting  Progressively increasing pain    Signs of wound infection (redness, swelling, snot/pus-like drainage)  Excessive bright red bleeding on dressing   Excessive swelling and tightness  Increasing numbness        Follow-up Appointment: 10-14 days  Please call the office at [phone] if you have any questions or concerns regarding your post-operative care

## 2021-09-17 NOTE — OP NOTE
OPERATIVE REPORT  PATIENT NAME: Anna De    :  1970  MRN: [id number]  Pt Location: [facility] OR ROOM 04    SURGERY DATE: 2021    Surgeon(s) and Role:     * DO Mary Beth Cullen Primary    Preop Diagnosis:  Carpal tunnel syndrome, bilateral [G56 03]    Post-Op Diagnosis Codes:     * Carpal tunnel syndrome, bilateral [G56 03]    Procedure(s) (LRB):  RELEASE CARPAL TUNNEL (Right)    Specimen(s):  * No specimens in log *    Estimated Blood Loss:   Minimal    Drains:  * No LDAs found *    Anesthesia Type:   IV Sedation with Anesthesia    Operative Indications:  Carpal tunnel syndrome, bilateral [G56 03]      Operative Findings: Thickened TCL  Post release no MN injury noted    Complications:   None    Procedure and Technique:  Patient is a 46 year female presented the office with right hand numbness  Clinically she her exam was consistent with carpal tunnel syndrome  We obtain ultrasound which confirmed this  Surgical versus nonsurgical options were discussed  Ultrasound was reviewed which demonstrated carpal tunnel  Patient consented undergo carpal tunnel release in the operating room  Risks and benefits were explained risks including not limited to infection, bleeding, nerve or vessel damage, recurrence, incomplete release, pain, need for future surgery  Patient consented to the surgery  All questions were answered  The day of surgery patient identified by 1st last name  The right upper extremity was marked  Patient with the anesthesia team they deemed that sedation plus local is most appropriate  Patient consented this  Patient was transferred from the preop area the OR underwent sedation without complication  Time-out was performed successfully  Everybody in the room was in agreement  I reviewed the consent form myself  Antibiotics had been given  Using a 50 50 mix of 1% plain lidocaine and 0 5% plain Marcaine median nerve block was performed under sterile technique    Patient tolerated this well  Well-padded tourniquet was placed on the arm and patient underwent sterile prep and drape  The limb was exsanguinated Esmarch bandage and tourniquet was elevated to 250 mmHg  Attention was then turned to the palm  A 1 5 cm incision was made in line with the radial border of the ring finger starting at Neumann's cardinal line distally extending proximally  Careful dissection was performed skin and subcutaneous tissue  We countered the palmar fascia  This was incised longitudinally with a 15 blade  A dull Heiss retractor was used to retract any neurovascular structures and soft tissues  Then encountered the transcarpal ligament  Any thenar musculature was swept radially off the transverse carpal ligament  This gave us excellent visualization of the transcarpal ligament  This was incised in its midportion using a crescent knife and extended distally to the nedra arch fat  Distally we noted complete release  We then turned proximally  A plane was created between the transcarpal ligament superficial tissues  A right angle retractors placed in this plane  This gave us excellent visualization of the proximal portion of the transcarpal ligament and distal portion antebrachial fascia  A Bassfield elevator was used to release any adhesions between the transverse carpal ligament and median nerve  A Christa knife was then used to release remainder of the transcarpal ligament distal portion of the antebrachial fascia  The median nerve was then evaluated and no nerve injury was noted  After noted complete release of the median nerve no nerve injury the tourniquet was released  All bleeding was stopped with bipolar cautery direct pressure  The wound was thoroughly irrigated with normal saline solution  Wound was then closed with Vicryl Rapide suture  Patient tolerated surgery well  She is placed in a sterile dressing and transferred from the OR to PACU in stable condition     I was present for the entire procedure and A qualified resident physician was not available    Patient Disposition:  PACU  and hemodynamically stable    SIGNATURE: Kimberlyn Gonzalez DO  DATE: September 17, 2021  TIME: 1:49 PM

## 2021-09-17 NOTE — ANESTHESIA POSTPROCEDURE EVALUATION
Post-Op Assessment Note    CV Status:  Stable       Mental Status:  Sleepy   Hydration Status:  Stable   PONV Controlled:  Controlled   Airway Patency:  Patent      Post Op Vitals Reviewed: Yes      Staff: CRNA         No complications documented      BP  114/66   Temp      Pulse 66   Resp   20   SpO2   100

## 2021-09-17 NOTE — PERIOPERATIVE NURSING NOTE
Iv access removed, belongings gathered by spouse, patient able to tolerate fluids post procedure, vitals wdl, dressing clean dry intact,patient able to move fingers in R extremities, prescriptions sent to pharmacy, discharge instructions provided to patient and spouse, both stated understanding  Left floor via wheelchair by RN accompanied by spouse, discharged to home

## 2021-09-23 ENCOUNTER — TELEPHONE (OUTPATIENT)
Dept: OBGYN CLINIC | Facility: HOSPITAL | Age: 51
End: 2021-09-23

## 2021-09-23 NOTE — TELEPHONE ENCOUNTER
Patient called in to ask how long she needs to keep her incision dry  She removed the dressing already and is using neosporin and a band aid on it currently  Advised that she should keep it dry until follow up  If it gets wet, this is okay but she should pat it to dry well and change bandage  No soaking  Understanding verbalized

## 2021-09-29 ENCOUNTER — HOSPITAL ENCOUNTER (OUTPATIENT)
Dept: RADIOLOGY | Facility: HOSPITAL | Age: 51
Discharge: HOME | End: 2021-09-29
Payer: COMMERCIAL

## 2021-09-29 DIAGNOSIS — R07.0 THROAT PAIN: ICD-10-CM

## 2021-09-29 DIAGNOSIS — H92.01 OTALGIA, RIGHT: ICD-10-CM

## 2021-09-29 DIAGNOSIS — M54.2 NECK PAIN: ICD-10-CM

## 2021-09-29 DIAGNOSIS — R51.9 FACIAL PAIN: ICD-10-CM

## 2021-09-29 DIAGNOSIS — G35 MULTIPLE SCLEROSIS (HCC): ICD-10-CM

## 2021-09-29 DIAGNOSIS — H93.11 RIGHT-SIDED TINNITUS: ICD-10-CM

## 2021-09-29 DIAGNOSIS — M26.609 TMJ (TEMPOROMANDIBULAR JOINT SYNDROME): ICD-10-CM

## 2021-09-29 PROCEDURE — 70553 MRI BRAIN STEM W/O & W/DYE: CPT

## 2021-09-29 PROCEDURE — G1004 CDSM NDSC: HCPCS

## 2021-09-29 PROCEDURE — A9585 GADOBUTROL INJECTION: HCPCS | Performed by: NURSE PRACTITIONER

## 2021-09-29 RX ADMIN — GADOBUTROL 11 ML: 604.72 INJECTION INTRAVENOUS at 16:30

## 2021-10-04 ENCOUNTER — TELEPHONE (OUTPATIENT)
Dept: OTOLARYNGOLOGY | Facility: CLINIC | Age: 51
End: 2021-10-04

## 2021-10-07 ENCOUNTER — ANESTHESIA EVENT (OUTPATIENT)
Dept: PERIOP | Facility: AMBULARY SURGERY CENTER | Age: 51
End: 2021-10-07
Payer: COMMERCIAL

## 2021-10-08 ENCOUNTER — OFFICE VISIT (OUTPATIENT)
Dept: OBGYN CLINIC | Facility: CLINIC | Age: 51
End: 2021-10-08

## 2021-10-08 VITALS
BODY MASS INDEX: 34.8 KG/M2 | HEIGHT: 69 IN | WEIGHT: 235 LBS | DIASTOLIC BLOOD PRESSURE: 84 MMHG | SYSTOLIC BLOOD PRESSURE: 126 MMHG | HEART RATE: 76 BPM

## 2021-10-08 DIAGNOSIS — G56.03 CARPAL TUNNEL SYNDROME, BILATERAL: Primary | ICD-10-CM

## 2021-10-08 PROCEDURE — 99024 POSTOP FOLLOW-UP VISIT: CPT | Performed by: ORTHOPAEDIC SURGERY

## 2021-10-15 RX ORDER — ESTRADIOL 1 MG/1
2 TABLET ORAL
COMMUNITY

## 2021-10-18 ENCOUNTER — TELEPHONE (OUTPATIENT)
Dept: OBGYN CLINIC | Facility: HOSPITAL | Age: 51
End: 2021-10-18

## 2021-10-20 ENCOUNTER — HOSPITAL ENCOUNTER (OUTPATIENT)
Facility: AMBULARY SURGERY CENTER | Age: 51
Setting detail: OUTPATIENT SURGERY
Discharge: HOME | End: 2021-10-20
Attending: ORTHOPAEDIC SURGERY | Admitting: ORTHOPAEDIC SURGERY
Payer: COMMERCIAL

## 2021-10-20 ENCOUNTER — ANESTHESIA (OUTPATIENT)
Dept: PERIOP | Facility: AMBULARY SURGERY CENTER | Age: 51
End: 2021-10-20
Payer: COMMERCIAL

## 2021-10-20 ENCOUNTER — APPOINTMENT (OUTPATIENT)
Dept: RADIOLOGY | Facility: AMBULARY SURGERY CENTER | Age: 51
End: 2021-10-20
Payer: COMMERCIAL

## 2021-10-20 VITALS
HEART RATE: 80 BPM | WEIGHT: 239 LBS | HEIGHT: 69 IN | TEMPERATURE: 97.2 F | SYSTOLIC BLOOD PRESSURE: 128 MMHG | OXYGEN SATURATION: 96 % | RESPIRATION RATE: 20 BRPM | BODY MASS INDEX: 35.4 KG/M2 | DIASTOLIC BLOOD PRESSURE: 60 MMHG

## 2021-10-20 DIAGNOSIS — M21.41 PES PLANOVALGUS, ACQUIRED, RIGHT: Primary | ICD-10-CM

## 2021-10-20 DIAGNOSIS — S86.111A: ICD-10-CM

## 2021-10-20 PROCEDURE — C1713 ANCHOR/SCREW BN/BN,TIS/BN: HCPCS | Performed by: ORTHOPAEDIC SURGERY

## 2021-10-20 PROCEDURE — 28300 INCISION OF HEEL BONE: CPT | Performed by: PHYSICIAN ASSISTANT

## 2021-10-20 PROCEDURE — 28304 INCISION OF MIDFOOT BONES: CPT | Performed by: ORTHOPAEDIC SURGERY

## 2021-10-20 PROCEDURE — NC001 PR NO CHARGE: Performed by: ORTHOPAEDIC SURGERY

## 2021-10-20 PROCEDURE — 73600 X-RAY EXAM OF ANKLE: CPT

## 2021-10-20 PROCEDURE — 28300 INCISION OF HEEL BONE: CPT | Performed by: ORTHOPAEDIC SURGERY

## 2021-10-20 PROCEDURE — 27685 REVISION OF LOWER LEG TENDON: CPT | Performed by: ORTHOPAEDIC SURGERY

## 2021-10-20 PROCEDURE — C9290 INJ, BUPIVACAINE LIPOSOME: HCPCS | Performed by: ANESTHESIOLOGY

## 2021-10-20 PROCEDURE — 28304 INCISION OF MIDFOOT BONES: CPT | Performed by: PHYSICIAN ASSISTANT

## 2021-10-20 PROCEDURE — 27685 REVISION OF LOWER LEG TENDON: CPT | Performed by: PHYSICIAN ASSISTANT

## 2021-10-20 PROCEDURE — 27691 REVISE LOWER LEG TENDON: CPT | Performed by: PHYSICIAN ASSISTANT

## 2021-10-20 PROCEDURE — 27691 REVISE LOWER LEG TENDON: CPT | Performed by: ORTHOPAEDIC SURGERY

## 2021-10-20 DEVICE — IMPLANTABLE DEVICE
Type: IMPLANTABLE DEVICE | Site: FOOT | Status: FUNCTIONAL
Brand: BIOFOAM

## 2021-10-20 DEVICE — IMPLANTABLE DEVICE: Type: IMPLANTABLE DEVICE | Site: FOOT | Status: FUNCTIONAL

## 2021-10-20 DEVICE — K-WIRE 0.62 X 9.25IN TROCAR TIP THRD: Type: IMPLANTABLE DEVICE | Site: FOOT | Status: FUNCTIONAL

## 2021-10-20 DEVICE — SCREW COMP 7 X 50MM FT: Type: IMPLANTABLE DEVICE | Site: FOOT | Status: FUNCTIONAL

## 2021-10-20 RX ORDER — FENTANYL CITRATE/PF 50 MCG/ML
50 SYRINGE (ML) INJECTION
Status: DISCONTINUED | OUTPATIENT
Start: 2021-10-20 | End: 2021-10-20 | Stop reason: HOSPADM

## 2021-10-20 RX ORDER — CHLORHEXIDINE GLUCONATE 4 G/100ML
SOLUTION TOPICAL DAILY PRN
Status: DISCONTINUED | OUTPATIENT
Start: 2021-10-20 | End: 2021-10-20 | Stop reason: HOSPADM

## 2021-10-20 RX ORDER — CEFAZOLIN SODIUM 1 G/50ML
1000 SOLUTION INTRAVENOUS ONCE
Status: DISCONTINUED | OUTPATIENT
Start: 2021-10-20 | End: 2021-10-20 | Stop reason: HOSPADM

## 2021-10-20 RX ORDER — ONDANSETRON 2 MG/ML
4 INJECTION INTRAMUSCULAR; INTRAVENOUS ONCE AS NEEDED
Status: DISCONTINUED | OUTPATIENT
Start: 2021-10-20 | End: 2021-10-20 | Stop reason: HOSPADM

## 2021-10-20 RX ORDER — FENTANYL CITRATE 50 UG/ML
INJECTION, SOLUTION INTRAMUSCULAR; INTRAVENOUS AS NEEDED
Status: DISCONTINUED | OUTPATIENT
Start: 2021-10-20 | End: 2021-10-20

## 2021-10-20 RX ORDER — OXYCODONE HYDROCHLORIDE 5 MG/1
5 TABLET ORAL EVERY 4 HOURS PRN
Status: DISCONTINUED | OUTPATIENT
Start: 2021-10-20 | End: 2021-10-20 | Stop reason: HOSPADM

## 2021-10-20 RX ORDER — LIDOCAINE HYDROCHLORIDE 10 MG/ML
INJECTION, SOLUTION EPIDURAL; INFILTRATION; INTRACAUDAL; PERINEURAL AS NEEDED
Status: DISCONTINUED | OUTPATIENT
Start: 2021-10-20 | End: 2021-10-20

## 2021-10-20 RX ORDER — BUPIVACAINE HYDROCHLORIDE 5 MG/ML
INJECTION, SOLUTION PERINEURAL
Status: COMPLETED | OUTPATIENT
Start: 2021-10-20 | End: 2021-10-20

## 2021-10-20 RX ORDER — VANCOMYCIN HYDROCHLORIDE 1 G/20ML
INJECTION, POWDER, LYOPHILIZED, FOR SOLUTION INTRAVENOUS AS NEEDED
Status: DISCONTINUED | OUTPATIENT
Start: 2021-10-20 | End: 2021-10-20 | Stop reason: HOSPADM

## 2021-10-20 RX ORDER — DEXAMETHASONE SODIUM PHOSPHATE 4 MG/ML
INJECTION, SOLUTION INTRA-ARTICULAR; INTRALESIONAL; INTRAMUSCULAR; INTRAVENOUS; SOFT TISSUE AS NEEDED
Status: DISCONTINUED | OUTPATIENT
Start: 2021-10-20 | End: 2021-10-20

## 2021-10-20 RX ORDER — CEFAZOLIN SODIUM 2 G/50ML
SOLUTION INTRAVENOUS AS NEEDED
Status: DISCONTINUED | OUTPATIENT
Start: 2021-10-20 | End: 2021-10-20

## 2021-10-20 RX ORDER — ONDANSETRON 2 MG/ML
INJECTION INTRAMUSCULAR; INTRAVENOUS AS NEEDED
Status: DISCONTINUED | OUTPATIENT
Start: 2021-10-20 | End: 2021-10-20

## 2021-10-20 RX ORDER — ONDANSETRON 4 MG/1
4 TABLET, FILM COATED ORAL EVERY 8 HOURS PRN
Qty: 20 TABLET | Refills: 0 | Status: SHIPPED | OUTPATIENT
Start: 2021-10-20 | End: 2022-03-16 | Stop reason: ALTCHOICE

## 2021-10-20 RX ORDER — MAGNESIUM HYDROXIDE 1200 MG/15ML
LIQUID ORAL AS NEEDED
Status: DISCONTINUED | OUTPATIENT
Start: 2021-10-20 | End: 2021-10-20 | Stop reason: HOSPADM

## 2021-10-20 RX ORDER — SODIUM CHLORIDE, SODIUM LACTATE, POTASSIUM CHLORIDE, CALCIUM CHLORIDE 600; 310; 30; 20 MG/100ML; MG/100ML; MG/100ML; MG/100ML
100 INJECTION, SOLUTION INTRAVENOUS CONTINUOUS
Status: DISCONTINUED | OUTPATIENT
Start: 2021-10-20 | End: 2021-10-20 | Stop reason: HOSPADM

## 2021-10-20 RX ORDER — OXYCODONE HYDROCHLORIDE 5 MG/1
5 TABLET ORAL EVERY 4 HOURS PRN
Qty: 30 TABLET | Refills: 0 | Status: SHIPPED | OUTPATIENT
Start: 2021-10-20 | End: 2021-10-25

## 2021-10-20 RX ORDER — SODIUM CHLORIDE, SODIUM LACTATE, POTASSIUM CHLORIDE, CALCIUM CHLORIDE 600; 310; 30; 20 MG/100ML; MG/100ML; MG/100ML; MG/100ML
20 INJECTION, SOLUTION INTRAVENOUS CONTINUOUS
Status: DISCONTINUED | OUTPATIENT
Start: 2021-10-20 | End: 2021-10-20 | Stop reason: HOSPADM

## 2021-10-20 RX ORDER — MIDAZOLAM HYDROCHLORIDE 2 MG/2ML
INJECTION, SOLUTION INTRAMUSCULAR; INTRAVENOUS AS NEEDED
Status: DISCONTINUED | OUTPATIENT
Start: 2021-10-20 | End: 2021-10-20

## 2021-10-20 RX ORDER — ASPIRIN 325 MG
325 TABLET, DELAYED RELEASE (ENTERIC COATED) ORAL 2 TIMES DAILY
Qty: 84 TABLET | Refills: 0 | Status: SHIPPED | OUTPATIENT
Start: 2021-10-20 | End: 2022-03-16 | Stop reason: ALTCHOICE

## 2021-10-20 RX ORDER — PROPOFOL 10 MG/ML
INJECTION, EMULSION INTRAVENOUS AS NEEDED
Status: DISCONTINUED | OUTPATIENT
Start: 2021-10-20 | End: 2021-10-20

## 2021-10-20 RX ADMIN — ONDANSETRON 4 MG: 2 INJECTION INTRAMUSCULAR; INTRAVENOUS at 10:06

## 2021-10-20 RX ADMIN — FENTANYL CITRATE 50 MCG: 50 INJECTION, SOLUTION INTRAMUSCULAR; INTRAVENOUS at 09:59

## 2021-10-20 RX ADMIN — FENTANYL CITRATE 50 MCG: 50 INJECTION INTRAMUSCULAR; INTRAVENOUS at 12:00

## 2021-10-20 RX ADMIN — DEXAMETHASONE SODIUM PHOSPHATE 4 MG: 4 INJECTION INTRA-ARTICULAR; INTRALESIONAL; INTRAMUSCULAR; INTRAVENOUS; SOFT TISSUE at 10:06

## 2021-10-20 RX ADMIN — MIDAZOLAM HYDROCHLORIDE 2 MG: 1 INJECTION, SOLUTION INTRAMUSCULAR; INTRAVENOUS at 09:33

## 2021-10-20 RX ADMIN — PROPOFOL 200 MG: 10 INJECTION, EMULSION INTRAVENOUS at 09:59

## 2021-10-20 RX ADMIN — SODIUM CHLORIDE, SODIUM LACTATE, POTASSIUM CHLORIDE, AND CALCIUM CHLORIDE: .6; .31; .03; .02 INJECTION, SOLUTION INTRAVENOUS at 10:08

## 2021-10-20 RX ADMIN — BUPIVACAINE HYDROCHLORIDE 5 ML: 5 INJECTION, SOLUTION PERINEURAL at 09:38

## 2021-10-20 RX ADMIN — BUPIVACAINE HYDROCHLORIDE 5 ML: 5 INJECTION, SOLUTION PERINEURAL at 09:35

## 2021-10-20 RX ADMIN — FENTANYL CITRATE 50 MCG: 50 INJECTION, SOLUTION INTRAMUSCULAR; INTRAVENOUS at 09:33

## 2021-10-20 RX ADMIN — CEFAZOLIN SODIUM 2000 MG: 2 SOLUTION INTRAVENOUS at 09:54

## 2021-10-20 RX ADMIN — LIDOCAINE HYDROCHLORIDE 50 MG: 10 INJECTION, SOLUTION EPIDURAL; INFILTRATION; INTRACAUDAL at 09:59

## 2021-10-28 ENCOUNTER — TELEPHONE (OUTPATIENT)
Dept: OBGYN CLINIC | Facility: HOSPITAL | Age: 51
End: 2021-10-28

## 2021-10-28 DIAGNOSIS — S86.111A: Primary | ICD-10-CM

## 2021-10-28 RX ORDER — OXYCODONE HYDROCHLORIDE 5 MG/1
5 TABLET ORAL EVERY 4 HOURS PRN
Qty: 15 TABLET | Refills: 0 | Status: SHIPPED | OUTPATIENT
Start: 2021-10-28 | End: 2021-11-02

## 2021-11-01 ENCOUNTER — TELEPHONE (OUTPATIENT)
Dept: OBGYN CLINIC | Facility: HOSPITAL | Age: 51
End: 2021-11-01

## 2021-11-09 ENCOUNTER — OFFICE VISIT (OUTPATIENT)
Dept: OBGYN CLINIC | Facility: CLINIC | Age: 51
End: 2021-11-09
Payer: COMMERCIAL

## 2021-11-09 VITALS
DIASTOLIC BLOOD PRESSURE: 87 MMHG | HEART RATE: 89 BPM | SYSTOLIC BLOOD PRESSURE: 156 MMHG | BODY MASS INDEX: 35.29 KG/M2 | HEIGHT: 69 IN

## 2021-11-09 DIAGNOSIS — M21.41 PES PLANOVALGUS, ACQUIRED, RIGHT: Primary | ICD-10-CM

## 2021-11-09 DIAGNOSIS — M76.821 POSTERIOR TIBIAL TENDON DYSFUNCTION, RIGHT: ICD-10-CM

## 2021-11-09 DIAGNOSIS — S86.111D TRAUMATIC RUPTURE OF RIGHT POSTERIOR TIBIAL TENDON, SUBSEQUENT ENCOUNTER: ICD-10-CM

## 2021-11-09 PROCEDURE — 29405 APPL SHORT LEG CAST: CPT | Performed by: ORTHOPAEDIC SURGERY

## 2021-11-09 PROCEDURE — 99024 POSTOP FOLLOW-UP VISIT: CPT | Performed by: ORTHOPAEDIC SURGERY

## 2021-11-30 ENCOUNTER — OFFICE VISIT (OUTPATIENT)
Dept: OBGYN CLINIC | Facility: CLINIC | Age: 51
End: 2021-11-30

## 2021-11-30 ENCOUNTER — APPOINTMENT (OUTPATIENT)
Dept: RADIOLOGY | Facility: AMBULARY SURGERY CENTER | Age: 51
End: 2021-11-30
Attending: ORTHOPAEDIC SURGERY
Payer: COMMERCIAL

## 2021-11-30 VITALS — BODY MASS INDEX: 35.4 KG/M2 | WEIGHT: 239 LBS | HEIGHT: 69 IN

## 2021-11-30 DIAGNOSIS — M21.41 PES PLANOVALGUS, ACQUIRED, RIGHT: ICD-10-CM

## 2021-11-30 DIAGNOSIS — M21.41 PES PLANOVALGUS, ACQUIRED, RIGHT: Primary | ICD-10-CM

## 2021-11-30 PROCEDURE — 73600 X-RAY EXAM OF ANKLE: CPT

## 2021-11-30 PROCEDURE — 73630 X-RAY EXAM OF FOOT: CPT

## 2021-11-30 PROCEDURE — 99024 POSTOP FOLLOW-UP VISIT: CPT | Performed by: ORTHOPAEDIC SURGERY

## 2021-12-08 ENCOUNTER — APPOINTMENT (OUTPATIENT)
Dept: PHYSICAL THERAPY | Facility: CLINIC | Age: 51
End: 2021-12-08
Payer: COMMERCIAL

## 2021-12-09 ENCOUNTER — TELEPHONE (OUTPATIENT)
Dept: OBGYN CLINIC | Facility: HOSPITAL | Age: 51
End: 2021-12-09

## 2021-12-09 ENCOUNTER — EVALUATION (OUTPATIENT)
Dept: PHYSICAL THERAPY | Facility: CLINIC | Age: 51
End: 2021-12-09
Payer: COMMERCIAL

## 2021-12-09 DIAGNOSIS — M21.41 PES PLANOVALGUS, ACQUIRED, RIGHT: Primary | ICD-10-CM

## 2021-12-09 DIAGNOSIS — S86.111D: ICD-10-CM

## 2021-12-09 DIAGNOSIS — Z98.890 STATUS POST OSTEOTOMY: ICD-10-CM

## 2021-12-09 PROCEDURE — 97161 PT EVAL LOW COMPLEX 20 MIN: CPT

## 2021-12-13 ENCOUNTER — OFFICE VISIT (OUTPATIENT)
Dept: PHYSICAL THERAPY | Facility: CLINIC | Age: 51
End: 2021-12-13
Payer: COMMERCIAL

## 2021-12-13 DIAGNOSIS — Z98.890 STATUS POST OSTEOTOMY: ICD-10-CM

## 2021-12-13 DIAGNOSIS — S86.111D: ICD-10-CM

## 2021-12-13 DIAGNOSIS — M21.41 PES PLANOVALGUS, ACQUIRED, RIGHT: Primary | ICD-10-CM

## 2021-12-13 PROCEDURE — 97140 MANUAL THERAPY 1/> REGIONS: CPT

## 2021-12-13 PROCEDURE — 97112 NEUROMUSCULAR REEDUCATION: CPT

## 2021-12-13 PROCEDURE — 97110 THERAPEUTIC EXERCISES: CPT

## 2021-12-15 ENCOUNTER — TELEPHONE (OUTPATIENT)
Dept: OBGYN CLINIC | Facility: HOSPITAL | Age: 51
End: 2021-12-15

## 2021-12-15 ENCOUNTER — OFFICE VISIT (OUTPATIENT)
Dept: PHYSICAL THERAPY | Facility: CLINIC | Age: 51
End: 2021-12-15
Payer: COMMERCIAL

## 2021-12-15 DIAGNOSIS — S86.111D: Primary | ICD-10-CM

## 2021-12-15 DIAGNOSIS — Z98.890 STATUS POST OSTEOTOMY: ICD-10-CM

## 2021-12-15 DIAGNOSIS — M21.41 PES PLANOVALGUS, ACQUIRED, RIGHT: ICD-10-CM

## 2021-12-15 PROCEDURE — 97112 NEUROMUSCULAR REEDUCATION: CPT

## 2021-12-15 PROCEDURE — 97140 MANUAL THERAPY 1/> REGIONS: CPT

## 2021-12-15 PROCEDURE — 97110 THERAPEUTIC EXERCISES: CPT

## 2021-12-21 ENCOUNTER — OFFICE VISIT (OUTPATIENT)
Dept: PHYSICAL THERAPY | Facility: CLINIC | Age: 51
End: 2021-12-21
Payer: COMMERCIAL

## 2021-12-21 DIAGNOSIS — G62.9 NEUROPATHY: Primary | ICD-10-CM

## 2021-12-21 DIAGNOSIS — Z98.890 STATUS POST OSTEOTOMY: ICD-10-CM

## 2021-12-21 DIAGNOSIS — M21.41 PES PLANOVALGUS, ACQUIRED, RIGHT: Primary | ICD-10-CM

## 2021-12-21 DIAGNOSIS — S86.111D: ICD-10-CM

## 2021-12-21 PROCEDURE — 97112 NEUROMUSCULAR REEDUCATION: CPT

## 2021-12-21 PROCEDURE — 97110 THERAPEUTIC EXERCISES: CPT

## 2021-12-21 RX ORDER — GABAPENTIN 100 MG/1
100 CAPSULE ORAL 2 TIMES DAILY
Qty: 60 CAPSULE | Refills: 2 | Status: SHIPPED | OUTPATIENT
Start: 2021-12-21 | End: 2022-02-15

## 2021-12-22 ENCOUNTER — APPOINTMENT (OUTPATIENT)
Dept: PHYSICAL THERAPY | Facility: CLINIC | Age: 51
End: 2021-12-22
Payer: COMMERCIAL

## 2021-12-23 ENCOUNTER — OFFICE VISIT (OUTPATIENT)
Dept: OBGYN CLINIC | Facility: CLINIC | Age: 51
End: 2021-12-23
Payer: COMMERCIAL

## 2021-12-23 VITALS
HEART RATE: 76 BPM | WEIGHT: 239 LBS | HEIGHT: 69 IN | DIASTOLIC BLOOD PRESSURE: 91 MMHG | BODY MASS INDEX: 35.4 KG/M2 | SYSTOLIC BLOOD PRESSURE: 161 MMHG

## 2021-12-23 DIAGNOSIS — M18.0 ARTHRITIS OF CARPOMETACARPAL (CMC) JOINT OF BOTH THUMBS: Primary | ICD-10-CM

## 2021-12-23 PROCEDURE — 99213 OFFICE O/P EST LOW 20 MIN: CPT | Performed by: ORTHOPAEDIC SURGERY

## 2021-12-23 PROCEDURE — 20600 DRAIN/INJ JOINT/BURSA W/O US: CPT | Performed by: ORTHOPAEDIC SURGERY

## 2021-12-23 RX ORDER — TRIAMCINOLONE ACETONIDE 40 MG/ML
20 INJECTION, SUSPENSION INTRA-ARTICULAR; INTRAMUSCULAR
Status: COMPLETED | OUTPATIENT
Start: 2021-12-23 | End: 2021-12-23

## 2021-12-23 RX ORDER — LIDOCAINE HYDROCHLORIDE 10 MG/ML
0.5 INJECTION, SOLUTION INFILTRATION; PERINEURAL
Status: COMPLETED | OUTPATIENT
Start: 2021-12-23 | End: 2021-12-23

## 2021-12-23 RX ADMIN — TRIAMCINOLONE ACETONIDE 20 MG: 40 INJECTION, SUSPENSION INTRA-ARTICULAR; INTRAMUSCULAR at 13:56

## 2021-12-23 RX ADMIN — LIDOCAINE HYDROCHLORIDE 0.5 ML: 10 INJECTION, SOLUTION INFILTRATION; PERINEURAL at 13:56

## 2021-12-29 ENCOUNTER — OFFICE VISIT (OUTPATIENT)
Dept: PHYSICAL THERAPY | Facility: CLINIC | Age: 51
End: 2021-12-29
Payer: COMMERCIAL

## 2021-12-29 DIAGNOSIS — M21.41 PES PLANOVALGUS, ACQUIRED, RIGHT: Primary | ICD-10-CM

## 2021-12-29 DIAGNOSIS — S86.111D: ICD-10-CM

## 2021-12-29 DIAGNOSIS — Z98.890 STATUS POST OSTEOTOMY: ICD-10-CM

## 2021-12-29 PROCEDURE — 97110 THERAPEUTIC EXERCISES: CPT | Performed by: PHYSICAL THERAPIST

## 2021-12-29 PROCEDURE — 97112 NEUROMUSCULAR REEDUCATION: CPT | Performed by: PHYSICAL THERAPIST

## 2021-12-29 PROCEDURE — 97140 MANUAL THERAPY 1/> REGIONS: CPT | Performed by: PHYSICAL THERAPIST

## 2021-12-30 ENCOUNTER — OFFICE VISIT (OUTPATIENT)
Dept: PHYSICAL THERAPY | Facility: CLINIC | Age: 51
End: 2021-12-30
Payer: COMMERCIAL

## 2021-12-30 DIAGNOSIS — Z98.890 STATUS POST OSTEOTOMY: ICD-10-CM

## 2021-12-30 DIAGNOSIS — M21.41 PES PLANOVALGUS, ACQUIRED, RIGHT: Primary | ICD-10-CM

## 2021-12-30 DIAGNOSIS — S86.111D: ICD-10-CM

## 2021-12-30 PROCEDURE — 97110 THERAPEUTIC EXERCISES: CPT

## 2021-12-30 PROCEDURE — 97140 MANUAL THERAPY 1/> REGIONS: CPT

## 2022-01-04 ENCOUNTER — APPOINTMENT (OUTPATIENT)
Dept: PHYSICAL THERAPY | Facility: CLINIC | Age: 52
End: 2022-01-04
Payer: COMMERCIAL

## 2022-01-05 ENCOUNTER — EVALUATION (OUTPATIENT)
Dept: PHYSICAL THERAPY | Facility: CLINIC | Age: 52
End: 2022-01-05
Payer: COMMERCIAL

## 2022-01-05 DIAGNOSIS — Z98.890 STATUS POST OSTEOTOMY: ICD-10-CM

## 2022-01-05 DIAGNOSIS — S86.111D: ICD-10-CM

## 2022-01-05 DIAGNOSIS — M21.41 PES PLANOVALGUS, ACQUIRED, RIGHT: Primary | ICD-10-CM

## 2022-01-05 PROCEDURE — 97110 THERAPEUTIC EXERCISES: CPT

## 2022-01-05 NOTE — PROGRESS NOTES
PT Re-Evaluation     Today's date: 2022  Patient name: Everardo Kay  : 1970  MRN: [id number]  Referring provider: Jamaal Dumont MD  Dx:   Encounter Diagnosis     ICD-10-CM    1  Pes planovalgus, acquired, right  M21 41    2  Traumatic rupture of posterior tibial tendon, right, subsequent encounter  S86 111D    3  Status post osteotomy  Z98 890        Start Time: 1620  Stop Time: 1700  Total time in clinic (min): 40 minutes    Assessment  Assessment details: 22: Patient has been consistently attending PT over the past four weeks, with improvements noted in ROM, strength, decreased pain, and improving gait pattern  Patient has progressed to ambulating in her shoe nearly the entire day without significant increased pain  Patient will benefit from continued skilled PT to address impairments remaining in strength, eccentric control, gait pattern, stairs and uneven surface negotiation for full return to prior level of function  Patient remains highly motivated for recovery and return to playing with her grandchildren  Patient is a 46year old female who presented to OPPT therapy for evaluation today s/p surgical repair of posterior tibial tendon rupture with osteotomies and flat foot repair performed as well  Patient is highly motivated for recovery and excited to get started with PT  Patient presents with impairments including decreased ROM, impaired strength, decreased functional use of R LE, and abnormal gait pattern using an AD and CAM boot at this time  Patient will benefit from skilled PT to address functional impairments and promote return to prior level of function with decreased pain     Impairments: abnormal gait, abnormal or restricted ROM, impaired balance, impaired physical strength, lacks appropriate home exercise program and pain with function  Functional limitations: unable to do yoga, requires AD for ambulation (Due to WB status)  Symptom irritability: lowUnderstanding of Dx/Px/POC: excellent  Goals  ST weeks (1/10/22)  1  Improve ROM to within 5 degrees of opposite side for increased mobility in ankle  MET  2  Progress to WBAT as per MD protocol  MET  3  Independent with initial HEP  MET  4  Return to stair negotiation with step-to gait pattern at home for increased quality of life  Progressing (extend x 4 weeks: 22)    LT weeks (3/3/22) All progressing  1  Independent with comprehensive HEP  2  Independent with reciprocal stair negotiation with minimal use of railing for return to prior level of function  3  Able to walk 2 miles without increased pain at self-selected pace for return to prior functional tasks  4  Initiate return to yoga for exercise and stress management  5  Ambulate with near-normal gait pattern without AD for return to independence  6  SLS >10 seconds on R LE for decreased risk of falls, improved balance and ankle stability  Plan  Patient would benefit from: skilled speech therapy and PT eval  Planned modality interventions: thermotherapy: hydrocollator packs, cryotherapy and unattended electrical stimulation  Planned therapy interventions: joint mobilization, manual therapy, balance/weight bearing training, neuromuscular re-education, patient education, strengthening, stretching, therapeutic activities, therapeutic exercise, gait training and home exercise program  Frequency: 2-3x per week  Duration in weeks: 12  Plan of Care beginning date: 2021  Plan of Care expiration date: 3/3/2022  Treatment plan discussed with: patient        Subjective Evaluation    History of Present Illness  Date of surgery: 10/20/2021  Mechanism of injury: surgery  Mechanism of injury: Patient sustained a fall last year with L wrist fracture and onset of R ankle pain at that time  Patient underwent skilled PT wtihout resolution of the problem at this time   Patient was finally identified to have a posterior tibial tendon rupture, with pt now s/p surgical repair of posterior tibial tendon rupture with osteotomies and flat foot repair performed as well  Patient was referred to OP physical therapy by Dr Donavan Templeton with protocol present for return to [facility] over several weeks  Quality of life: good    Pain  Current pain ratin (uncomfortable occasionally)  At best pain ratin  At worst pain rating: 3 (with too much activity)  Location: R lateral ankle  Quality: dull ache, tight and discomfort  Relieving factors: ice and rest  Aggravating factors: walking and stair climbing  Progression: improved    Social Support  Steps to enter house: yes  2  Stairs in house: yes (currently staying on first floor)   14  Lives in: multiple-level home  Lives with: significant other    Exercise history: Walking prior to surgery a couple times a week  Watched grandkids every day    Treatments  Previous treatment: physical therapy and occupational therapy (PT for R ankle, OT for L wrist)  Current treatment: physical therapy  Patient Goals  Patient goals for therapy: decreased edema, decreased pain, improved balance, independence with ADLs/IADLs and return to sport/leisure activities  Patient goal: to return to doing yoga, watching the grandchildren again        Objective  ROM:   L  JUSTYNA  R     21  Ankle dorsiflexion 5/10  -6*  0/5  Ankle Inversion  50  33  40  Ankle Eversion  17  0*/5*  5/10    MMT:    L  R  R     21  Hip flexion(SLR) 4/5  4/5  4+/5  Hip abduction   4/5  4+/5  4/5  Hip extension  4/5  4+/5  4+/5  Knee flexion (prone)    4/5  4/5  4+/5  Knee extension 5/5  4+/5  5/5   Ankle dorsiflexion 5/5  4-/5  5/5  Ankle inversion  4+/5  4-/5  4+/5  Ankle eversion  4+/5  3+/5*  4/5    Transfers:   22: Independent with functional transfers  Improved ability to perform with near-equal LE WB   21: Able to perform sit to stand transfers, with use of arms to support herself   Reports difficulty getting off her couch due to depth of couch rather than her foot  Gait:  1/5/22: Ambulating without AD with sneaker on, with improved sequencing, wt shifting and heel strike  Patient reported being sore after performing multiple times up down the stairs yesterday for laundry  Mild increased hip abduction noted, with improvements noted with cues  Feels that she is walking pretty normally on even surfaces  12/9/21: Ambulating with RW with decreased R WB, increased time required, and use of B/L UE to offload foot per Dr Radha Lara protocol  Adjusted walker height today with improved posture noted  Stairs:   1/5/22: Patient noted to have increasing ability to perform stairs with step-to gait pattern primarily utilized  Upon assessment on 4" step today, patient able to ascend step with R LE fairly well, but with poor control and form descending, which improved with cues/repetition  Patient still staying on first floor at this time  12/9/21: Minimally using stairs at this time, only to enter/exit home  Not performing steps to bedroom, moved bedroom to first floor  Precautions: MS, hx L wrist fx (s/p ORIF), progression of WB per MD protocol    HEP:   Access Code: [email or web address]  URL: [email or web address]  Date: 12/09/2021  Updated: 01/05/2022  Prepared by: Anmol Armenta    Exercises  · Seated Ankle Alphabet - 1 x daily - 7 x weekly - 1 sets - 10 reps  · Seated Ankle Circles - 1 x daily - 7 x weekly - 2 sets - 10 reps  · Seated Calf Towel Stretch - 1 x daily - 7 x weekly - 1 sets - 3 reps - 20 sec hold  · Long Sitting Ankle Plantar Flexion with Resistance - 1 x daily - 7 x weekly - 2 sets - 20 reps  · Ankle Dorsiflexion with Resistance - 1 x daily - 7 x weekly - 2 sets - 20 reps  · Ankle Eversion with Resistance - 1 x daily - 7 x weekly - 2 sets - 20 reps  · Ankle Inversion with Resistance - 1 x daily - 7 x weekly - 2 sets - 20 reps  · Sit to Stand without Arm Support - 1 x daily - 7 x weekly - 2 sets - 10 reps  · Gastroc Stretch on Wall - 2 x daily - 7 x weekly - 1 sets - 3 reps - 20 sec hold  · Single Leg Stance - 1 x daily - 7 x weekly - 1 sets - 10 reps - 5-10 sec hold  · Step Up - 1 x daily - 7 x weekly - 2 sets - 10 reps  · Lateral Step Ups - 1 x daily - 7 x weekly - 2 sets - 10 reps  · Standing Heel Raises - 1 x daily - 7 x weekly - 2 sets - 10 reps            Date 1/5/22 12/29/2021 12/30/2021   Visit # 7    5 6 FOTO             Manual     10' 10'    Scar mobs     TT  RR   STM to plantar fascia     TT RR                      Neuro Re-Ed 5'    15' 5'    Standing circles on round balance board 20x2 CW/CCW    BAPS sit 20x cw/ccw    Stance on foam     FT EO 10"x3 no boot In sneaker FT EC 30"x3    Stance on foam EC      Feet apart 10"x3 no boot    Alt foot taps onto 8" step         Tandem walk near rail     10'x4 no boot                       Ther Ex 35'    15' 30'   recumbent bike  5'      5'    Heel raises/TR  20x     20x each    Seated leg press       B/L 40# 2x10   Uni 15# 2x10 R   Side stepping TB @ ankles          Ankle TB x4 ways     2x10 ea inv/ever YTB; P/D looped grn    Sit to stand         Ankle alphabet         Ankle circles     2# 20x cw/ccw    gastroc stretch seated Standing 20"x3        Plantar fascia stretch         Mini squat at rail          LAQ          SLR flex/abd          Bridges          Prone PF/DF     2# 20x    MMT/ROM reassess LS-10'        step-up fwd 2x10 4" step  (inc step height)       Step-up lateral 2x10 4" step        Step-down 2x10 4" step                                   Ther Activity                           Gait Training         Progression of gait off AD per protocol                  Modalities

## 2022-01-06 ENCOUNTER — APPOINTMENT (OUTPATIENT)
Dept: PHYSICAL THERAPY | Facility: CLINIC | Age: 52
End: 2022-01-06
Payer: COMMERCIAL

## 2022-01-13 ENCOUNTER — APPOINTMENT (OUTPATIENT)
Dept: PHYSICAL THERAPY | Facility: CLINIC | Age: 52
End: 2022-01-13
Payer: COMMERCIAL

## 2022-01-17 ENCOUNTER — APPOINTMENT (OUTPATIENT)
Dept: PHYSICAL THERAPY | Facility: CLINIC | Age: 52
End: 2022-01-17
Payer: COMMERCIAL

## 2022-01-18 ENCOUNTER — OFFICE VISIT (OUTPATIENT)
Dept: OBGYN CLINIC | Facility: CLINIC | Age: 52
End: 2022-01-18

## 2022-01-18 ENCOUNTER — APPOINTMENT (OUTPATIENT)
Dept: RADIOLOGY | Facility: AMBULARY SURGERY CENTER | Age: 52
End: 2022-01-18
Attending: ORTHOPAEDIC SURGERY
Payer: COMMERCIAL

## 2022-01-18 VITALS
HEART RATE: 102 BPM | HEIGHT: 69 IN | BODY MASS INDEX: 35.55 KG/M2 | DIASTOLIC BLOOD PRESSURE: 92 MMHG | SYSTOLIC BLOOD PRESSURE: 149 MMHG | WEIGHT: 240 LBS

## 2022-01-18 DIAGNOSIS — M21.41 PES PLANOVALGUS, ACQUIRED, RIGHT: Primary | ICD-10-CM

## 2022-01-18 DIAGNOSIS — M21.41 PES PLANOVALGUS, ACQUIRED, RIGHT: ICD-10-CM

## 2022-01-18 PROCEDURE — 99024 POSTOP FOLLOW-UP VISIT: CPT | Performed by: ORTHOPAEDIC SURGERY

## 2022-01-18 PROCEDURE — 73630 X-RAY EXAM OF FOOT: CPT

## 2022-01-18 PROCEDURE — 73600 X-RAY EXAM OF ANKLE: CPT

## 2022-01-18 NOTE — PROGRESS NOTES
Davis Carrel, M D  Attending, Orthopaedic Surgery  Foot and Ankle  [facility]      ORTHOPAEDIC FOOT AND ANKLE POST-OP VISIT     Procedure:     Right 5 in 1 flatfoot correction       Date of surgery:   10/20/21      PLAN  1  Weightbearing Status- WBAT operative extremity  2  DVT prophylaxis- completed  3  Continue to elevate 23hrs/day getting up 1x per hour to prevent a blood clot  4  Pain control- OTC pain medication  5  RTC in 3 month(s)  6  Xrays needed next visit - yes Weightbearing Ankle - flatfoot series    History of Present Illness:   Chief Complaint:   Chief Complaint   Patient presents with    Right Ankle - Post-op     Gabriela Tsang is a 46 y o  female who is being seen for post-operative visit for the above procedure  Pain is well controlled and the patient has successfully transitioned to OTC pain medicines  she is taking completed ASA 325mg BID for DVT prophylaxis  Patient has been WBAT in a shoe  Review of Systems:  General- denies fever/chills  Respiratory- denies cough or SOB  Cardio- denies chest pain or palpitations  GI- denies abdominal pain  Musculoskeletal- Negative except noted above  Skin- denies rashes or wounds    Physical Exam:   /92 (BP Location: Right arm, Patient Position: Sitting, Cuff Size: Adult)   Pulse 102   Ht 5' 9" (1 753 m)   Wt 109 kg (240 lb)   BMI 35 44 kg/m²   General/Constitutional: No apparent distress: well-nourished and well developed  Eyes: normal ocular motion  Lymphatic: No appreciable lymphadenopathy  Respiratory: Non-labored breathing  Vascular: No edema, swelling or tenderness, except as noted in detailed exam   Integumentary: No impressive skin lesions present, except as noted in detailed exam   Neuro: No ataxia or tremors noted  Psych: Normal mood and affect, oriented to person, place and time  Appropriate affect  Musculoskeletal: Normal, except as noted in detailed exam and in HPI      Examination    right Incision Clean, dry, intact  Sutures Previously removed  Ecchymosis none    Swelling Mild    Sensation Intact to light touch throughout sural, saphenous, superficial peroneal, deep peroneal and medial/lateral plantar nerve distributions  [place] 5 07 filament (10g) testing deferred  Cardiovascular Brisk capillary refill < 2 seconds,intact DP and PT pulses    Special Tests None      Imaging Studies:   5 views of the right ankle/foot were taken, reviewed and interpreted independently that demonstrate intact hardware with healed osteotomy sites  No signs of hardware failure  Improved alignment of hindfoot  Reviewed by me personally  Robin Quaker Lachman, MD  Foot & Ankle Surgery   Department of [address] [place] [address]      I personally performed the service  Robin Quaker Lachman, MD    Scribe Attestation    I,:  Gaby Khalil PA-C am acting as a scribe while in the presence of the attending physician :       I,:  Farrah Rinaldi MD personally performed the services described in this documentation    as scribed in my presence :

## 2022-01-19 ENCOUNTER — OFFICE VISIT (OUTPATIENT)
Dept: PHYSICAL THERAPY | Facility: CLINIC | Age: 52
End: 2022-01-19
Payer: COMMERCIAL

## 2022-01-19 DIAGNOSIS — S86.111D: ICD-10-CM

## 2022-01-19 DIAGNOSIS — M21.41 PES PLANOVALGUS, ACQUIRED, RIGHT: Primary | ICD-10-CM

## 2022-01-19 DIAGNOSIS — Z98.890 STATUS POST OSTEOTOMY: ICD-10-CM

## 2022-01-19 PROCEDURE — 97112 NEUROMUSCULAR REEDUCATION: CPT

## 2022-01-19 PROCEDURE — 97110 THERAPEUTIC EXERCISES: CPT

## 2022-01-19 NOTE — PROGRESS NOTES
Daily Note     Today's date: 2022  Patient name: Huang Bailey  : 1970  MRN: [id number]  Referring provider: Carolina Alegre MD  Dx:   Encounter Diagnosis     ICD-10-CM    1  Pes planovalgus, acquired, right  M21 41    2  Traumatic rupture of posterior tibial tendon, right, subsequent encounter  S86 111D    3  Status post osteotomy  Z98 890        Start Time: 1530  Stop Time: 3350  Total time in clinic (min): 45 minutes    Subjective: Patient reports she is sore after overuse and lack of icing this week  Patient also reports fear with weightbearing into RLE  Objective: See treatment diary below      Assessment: Tolerated treatment well  Patient demonstrated improved WB into RLE with [employer] and [employer] today  Noted improved fluidity in ankle ROM noted with cw/ccw on round balance board  Patient educated on using compression stocking whenever she is standing (not just when going out of the house) to help decrease swelling and pain in R ankle  Patient would benefit from continued PT to address strength, ROM, proprioception, gait, and balance  Plan: Continue per plan of care  Precautions: MS, hx L wrist fx (s/p ORIF), progression of WB per MD protocol    HEP:   Access Code: [email or web address]  URL: [email or web address]  Date: 2021  Updated: 2022  Prepared by: Lew Bhatia    Exercises  · Seated Ankle Alphabet - 1 x daily - 7 x weekly - 1 sets - 10 reps  · Seated Ankle Circles - 1 x daily - 7 x weekly - 2 sets - 10 reps  · Seated Calf Towel Stretch - 1 x daily - 7 x weekly - 1 sets - 3 reps - 20 sec hold  · Long Sitting Ankle Plantar Flexion with Resistance - 1 x daily - 7 x weekly - 2 sets - 20 reps  · Ankle Dorsiflexion with Resistance - 1 x daily - 7 x weekly - 2 sets - 20 reps  · Ankle Eversion with Resistance - 1 x daily - 7 x weekly - 2 sets - 20 reps  · Ankle Inversion with Resistance - 1 x daily - 7 x weekly - 2 sets - 20 reps  · Sit to Stand without Arm Support - 1 x daily - 7 x weekly - 2 sets - 10 reps  · Gastroc Stretch on Wall - 2 x daily - 7 x weekly - 1 sets - 3 reps - 20 sec hold  · Single Leg Stance - 1 x daily - 7 x weekly - 1 sets - 10 reps - 5-10 sec hold  · Step Up - 1 x daily - 7 x weekly - 2 sets - 10 reps  · Lateral Step Ups - 1 x daily - 7 x weekly - 2 sets - 10 reps  · Standing Heel Raises - 1 x daily - 7 x weekly - 2 sets - 10 reps            Date 1/5/22 1/19/2022       Visit # 7 8                Manual         Scar mobs         STM to plantar fascia                           Neuro Re-Ed 5' 15'       Standing circles on round balance board 20x2 CW/CCW 20x2 CW/CCW       Stance on foam  30" x1        Stance on foam EC   30" x2       Alt foot taps onto step  6" step 1x10        Tandem walk near rail  3 laps at rail       WB AP/ML   20x ea                                           Ther Ex 35' 25'       recumbent bike  5'  5'       Heel raises/TR  20x 20x        Seated leg press   B/L 40# 2x10   Uni 15# 2x10 R       Side stepping TB @ ankles   On foam beam 3 laps at rail       Ankle TB x4 ways         Sit to stand         Ankle alphabet         Ankle circles         gastroc stretch seated Standing 20"x3 Standing 15"x5       Plantar fascia stretch         Mini squat at rail   2x10 at rail       LAQ          SLR flex/abd          Bridges          Prone PF/DF         MMT/ROM reassess LS-10'        step-up fwd 2x10 4" step  6" step 2x10        Step-up lateral 2x10 4" step  6" step 2x10        Step-down 2x10 4" step                                   Ther Activity                           Gait Training         Progression of gait off AD per protocol                  Modalities                             Patient treated by WILL Moreland under the direct supervision of Judge Andrea RIDER, MSPT with discussion of diagnosis, prognosis, and POC

## 2022-01-20 ENCOUNTER — OFFICE VISIT (OUTPATIENT)
Dept: PHYSICAL THERAPY | Facility: CLINIC | Age: 52
End: 2022-01-20
Payer: COMMERCIAL

## 2022-01-20 DIAGNOSIS — S86.111D: ICD-10-CM

## 2022-01-20 DIAGNOSIS — Z98.890 STATUS POST OSTEOTOMY: ICD-10-CM

## 2022-01-20 DIAGNOSIS — M21.41 PES PLANOVALGUS, ACQUIRED, RIGHT: Primary | ICD-10-CM

## 2022-01-20 PROCEDURE — 97140 MANUAL THERAPY 1/> REGIONS: CPT

## 2022-01-20 PROCEDURE — 97110 THERAPEUTIC EXERCISES: CPT

## 2022-01-20 NOTE — PROGRESS NOTES
Daily Note     Today's date: 2022  Patient name: Aleisha Loja  : 1970  MRN: [id number]  Referring provider: Lupe Burger MD  Dx:   Encounter Diagnosis     ICD-10-CM    1  Pes planovalgus, acquired, right  M21 41    2  Traumatic rupture of posterior tibial tendon, right, subsequent encounter  S86 111D    3  Status post osteotomy  Z98 890                   Subjective: pt reports that her ankle is a little sore today, "overall dragging today" complaints of having discomfort in her most posterior scar       Objective: See treatment diary below      Assessment: Tolerated treatment well  Pt cont with hesitation for SL activities on R LE but performs in clinic with no noted increases in pain  Patient would benefit from continued PT      Plan: Continue per plan of care  Precautions: MS, hx L wrist fx (s/p ORIF), progression of WB per MD protocol    HEP:   Access Code: [email or web address]  URL: [email or web address]  Date: 2021  Updated: 2022  Prepared by: Víctor Blandon    Exercises  · Seated Ankle Alphabet - 1 x daily - 7 x weekly - 1 sets - 10 reps  · Seated Ankle Circles - 1 x daily - 7 x weekly - 2 sets - 10 reps  · Seated Calf Towel Stretch - 1 x daily - 7 x weekly - 1 sets - 3 reps - 20 sec hold  · Long Sitting Ankle Plantar Flexion with Resistance - 1 x daily - 7 x weekly - 2 sets - 20 reps  · Ankle Dorsiflexion with Resistance - 1 x daily - 7 x weekly - 2 sets - 20 reps  · Ankle Eversion with Resistance - 1 x daily - 7 x weekly - 2 sets - 20 reps  · Ankle Inversion with Resistance - 1 x daily - 7 x weekly - 2 sets - 20 reps  · Sit to Stand without Arm Support - 1 x daily - 7 x weekly - 2 sets - 10 reps  · Gastroc Stretch on Wall - 2 x daily - 7 x weekly - 1 sets - 3 reps - 20 sec hold  · Single Leg Stance - 1 x daily - 7 x weekly - 1 sets - 10 reps - 5-10 sec hold  · Step Up - 1 x daily - 7 x weekly - 2 sets - 10 reps  · Lateral Step Ups - 1 x daily - 7 x weekly - 2 sets - 10 reps  · Standing Heel Raises - 1 x daily - 7 x weekly - 2 sets - 10 reps            Date 1/5/22 1/19/2022 1/20/2022      Visit # 7 8 9               Manual   5'       Scar mobs   RR       STM to plantar fascia                           Neuro Re-Ed 5' 15' 5'       Standing circles on round balance board 20x2 CW/CCW 20x2 CW/CCW 20x cw/ccw       Stance on foam  30" x1        Stance on foam EC   30" x2       Alt foot taps onto step  6" step 1x10        Tandem walk near rail  3 laps at rail 3 laps       WB AP/ML   20x ea                                           Ther Ex 35' 25' 30'       recumbent bike  5'  5' 5'       Heel raises/TR  20x 20x  HR off step 2x10       Seated leg press   B/L 40# 2x10   Uni 15# 2x10 R B/L 40# 2x12   Uni 15# 2x12 R      Side stepping TB @ ankles   On foam beam 3 laps at rail        Ankle TB x4 ways         Sit to stand   Low mat eccentric control 2x10       Ankle alphabet         Ankle circles         gastroc stretch seated Standing 20"x3 Standing 15"x5 Standing 15"x5       Plantar fascia stretch         Mini squat at rail   2x10 at rail 2x10 at rail      LAQ          SLR flex/abd          Bridges          Prone PF/DF         MMT/ROM reassess LS-10'        step-up fwd 2x10 4" step  6" step 2x10  W/ high knee 2x10       Step-up lateral 2x10 4" step  6" step 2x10        Step-down 2x10 4" step                                   Ther Activity                           Gait Training         Progression of gait off AD per protocol                  Modalities

## 2022-01-24 ENCOUNTER — APPOINTMENT (OUTPATIENT)
Dept: PHYSICAL THERAPY | Facility: CLINIC | Age: 52
End: 2022-01-24
Payer: COMMERCIAL

## 2022-01-25 ENCOUNTER — OFFICE VISIT (OUTPATIENT)
Dept: PHYSICAL THERAPY | Facility: CLINIC | Age: 52
End: 2022-01-25
Payer: COMMERCIAL

## 2022-01-25 DIAGNOSIS — Z98.890 STATUS POST OSTEOTOMY: ICD-10-CM

## 2022-01-25 DIAGNOSIS — M21.41 PES PLANOVALGUS, ACQUIRED, RIGHT: Primary | ICD-10-CM

## 2022-01-25 DIAGNOSIS — S86.111D: ICD-10-CM

## 2022-01-25 PROCEDURE — 97110 THERAPEUTIC EXERCISES: CPT

## 2022-01-25 NOTE — PROGRESS NOTES
Daily Note     Today's date: 2022  Patient name: Danyell Jean  : 1970  MRN: [id number]  Referring provider: Sangita Quiroz MD  Dx:   Encounter Diagnosis     ICD-10-CM    1  Pes planovalgus, acquired, right  M21 41    2  Traumatic rupture of posterior tibial tendon, right, subsequent encounter  S86 111D    3  Status post osteotomy  Z98 890                   Subjective: pt reports that she still struggle with descending on her stairs due to fear, and weakness  Objective: See treatment diary below      Assessment: Tolerated treatment well  Pt cont to demonstrate hesitation with SL activities on R LE but performs in clinic with no noted increases in pain  Patient demonstrated R LE weakness with B/L HR with S/L eccentric heel drop, modified to meet ability at this time, to progress as strength improves  Patient would benefit from continued PT    Plan: Continue per plan of care  Precautions: MS, hx L wrist fx (s/p ORIF), progression of WB per MD protocol    HEP:   Access Code: [email or web address]  URL: [email or web address]  Date: 2021  Updated: 2022  Prepared by: Malia Escobedo    Exercises  · Seated Ankle Alphabet - 1 x daily - 7 x weekly - 1 sets - 10 reps  · Seated Ankle Circles - 1 x daily - 7 x weekly - 2 sets - 10 reps  · Seated Calf Towel Stretch - 1 x daily - 7 x weekly - 1 sets - 3 reps - 20 sec hold  · Long Sitting Ankle Plantar Flexion with Resistance - 1 x daily - 7 x weekly - 2 sets - 20 reps  · Ankle Dorsiflexion with Resistance - 1 x daily - 7 x weekly - 2 sets - 20 reps  · Ankle Eversion with Resistance - 1 x daily - 7 x weekly - 2 sets - 20 reps  · Ankle Inversion with Resistance - 1 x daily - 7 x weekly - 2 sets - 20 reps  · Sit to Stand without Arm Support - 1 x daily - 7 x weekly - 2 sets - 10 reps  · Gastroc Stretch on Wall - 2 x daily - 7 x weekly - 1 sets - 3 reps - 20 sec hold  · Single Leg Stance - 1 x daily - 7 x weekly - 1 sets - 10 reps - 5-10 sec hold  · Step Up - 1 x daily - 7 x weekly - 2 sets - 10 reps  · Lateral Step Ups - 1 x daily - 7 x weekly - 2 sets - 10 reps  · Standing Heel Raises - 1 x daily - 7 x weekly - 2 sets - 10 reps            Date 1/5/22 1/19/2022 1/20/2022 1/25/2022     Visit # 7 8 9 10              Manual   5'       Scar mobs   RR       STM to plantar fascia                           Neuro Re-Ed 5' 15' 5'  5'     Standing circles on round balance board 20x2 CW/CCW 20x2 CW/CCW 20x cw/ccw  20x cw/ccw      Stance on foam  30" x1        Stance on foam EC   30" x2       Alt foot taps onto step  6" step 1x10        Tandem walk near rail  3 laps at rail 3 laps  5 laps      WB AP/ML   20x ea                                           Ther Ex 35' 25' 30'  40'     recumbent bike  5'  5' 5'  5'     Heel raises/TR  20x 20x  HR off step 2x10  HR off step 1x10 and   up with B/L then down w/ wt shift to R 2x10      Seated leg press   B/L 40# 2x10   Uni 15# 2x10 R B/L 40# 2x12   Uni 15# 2x12 R B/L 40# 2x10   Uni 15# 2x10 R     Side stepping TB @ ankles   On foam beam 3 laps at rail        Ankle TB x4 ways         Sit to stand   Low mat eccentric control 2x10       Ankle alphabet         Ankle circles         gastroc stretch seated Standing 20"x3 Standing 15"x5 Standing 15"x5  Standing 30" x4     Plantar fascia stretch         Mini squat at rail   2x10 at rail 2x10 at rail 2x10 at rail with      LAQ          SLR flex/abd          Bridges          Prone PF/DF         MMT/ROM reassess LS-10'        step-up fwd 2x10 4" step  6" step 2x10  W/ high knee 2x10       Step-up lateral 2x10 4" step  6" step 2x10   6" step 2x10      Step-down 2x10 4" step   6" step 2x10                                 Ther Activity                           Gait Training         Progression of gait off AD per protocol                  Modalities                              Patient treated by Jeremy Brown, SPT under the direct supervision of Jasson Dumont PT, MSPT with discussion of diagnosis, prognosis, and POC

## 2022-01-27 ENCOUNTER — OFFICE VISIT (OUTPATIENT)
Dept: PHYSICAL THERAPY | Facility: CLINIC | Age: 52
End: 2022-01-27
Payer: COMMERCIAL

## 2022-01-27 DIAGNOSIS — Z98.890 STATUS POST OSTEOTOMY: ICD-10-CM

## 2022-01-27 DIAGNOSIS — S86.111D: ICD-10-CM

## 2022-01-27 DIAGNOSIS — M21.41 PES PLANOVALGUS, ACQUIRED, RIGHT: Primary | ICD-10-CM

## 2022-01-27 PROCEDURE — 97110 THERAPEUTIC EXERCISES: CPT

## 2022-01-27 NOTE — PROGRESS NOTES
Daily Note     Today's date: 2022  Patient name: Kelli Kent  : 1970  MRN: [id number]  Referring provider: Daved Cockayne, MD  Dx:   Encounter Diagnosis     ICD-10-CM    1  Pes planovalgus, acquired, right  M21 41    2  Traumatic rupture of posterior tibial tendon, right, subsequent encounter  S86 111D    3  Status post osteotomy  Z98 890                   Subjective: pt reports she has been trying to go down steps with her L foot more to get more movement in her R foot and it has been becoming less uncomfortable  Feels most of her discomfort in the PM  Reports she has been icing at night in bed for an hr + at a time     Objective: See treatment diary below      Assessment: Tolerated treatment well  Pt edu on proper CP on/off ratios and for allowing her skin to return to normal temperature before icing again, dem an understanding  Initiation of SL HR on leg press today with no difficulties  Pt challenged with WB with no UE today, cont to progress as tolerated  Patient would benefit from continued PT      Plan: Continue per plan of care  Precautions: MS, hx L wrist fx (s/p ORIF), progression of WB per MD protocol    HEP:   Access Code: [email or web address]  URL: [email or web address]  Date: 2021  Updated: 2022  Prepared by: Mary Torres    Exercises  · Seated Ankle Alphabet - 1 x daily - 7 x weekly - 1 sets - 10 reps  · Seated Ankle Circles - 1 x daily - 7 x weekly - 2 sets - 10 reps  · Seated Calf Towel Stretch - 1 x daily - 7 x weekly - 1 sets - 3 reps - 20 sec hold  · Long Sitting Ankle Plantar Flexion with Resistance - 1 x daily - 7 x weekly - 2 sets - 20 reps  · Ankle Dorsiflexion with Resistance - 1 x daily - 7 x weekly - 2 sets - 20 reps  · Ankle Eversion with Resistance - 1 x daily - 7 x weekly - 2 sets - 20 reps  · Ankle Inversion with Resistance - 1 x daily - 7 x weekly - 2 sets - 20 reps  · Sit to Stand without Arm Support - 1 x daily - 7 x weekly - 2 sets - 10 reps  · Gastroc Stretch on Wall - 2 x daily - 7 x weekly - 1 sets - 3 reps - 20 sec hold  · Single Leg Stance - 1 x daily - 7 x weekly - 1 sets - 10 reps - 5-10 sec hold  · Step Up - 1 x daily - 7 x weekly - 2 sets - 10 reps  · Lateral Step Ups - 1 x daily - 7 x weekly - 2 sets - 10 reps  · Standing Heel Raises - 1 x daily - 7 x weekly - 2 sets - 10 reps            Date 1/5/22 1/19/2022 1/20/2022 1/25/2022 1/27/2022    Visit # 7 8 9 10 11              Manual   5'       Scar mobs   RR       STM to plantar fascia                           Neuro Re-Ed 5' 15' 5'  5'     Standing circles on round balance board 20x2 CW/CCW 20x2 CW/CCW 20x cw/ccw  20x cw/ccw      Stance on foam  30" x1        Stance on foam EC   30" x2       Alt foot taps onto step  6" step 1x10        Tandem walk near rail  3 laps at rail 3 laps  5 laps      WB AP/ML   20x ea   No UE support 20x ea                                         Ther Ex 35' 25' 30'  40' 40'    recumbent bike  5'  5' 5'  5' 5'     Heel raises/TR  20x 20x  HR off step 2x10  HR off step 1x10 and   up with B/L then down w/ wt shift to R 2x10  Up with 2 down with R 2x10     Seated leg press   B/L 40# 2x10   Uni 15# 2x10 R B/L 40# 2x12   Uni 15# 2x12 R B/L 40# 2x10   Uni 15# 2x10 R B/L 40# 2x15  Uni 15# 2x15 R     Supine SL HR on leg press      30# 2x10     Side stepping TB @ ankles   On foam beam 3 laps at rail        Ankle TB x4 ways         Sit to stand   Low mat eccentric control 2x10   Low mat eccentric control 2x10     Ankle alphabet         Ankle circles         gastroc stretch seated Standing 20"x3 Standing 15"x5 Standing 15"x5  Standing 30" x4 On slant board  & soleus stretch 20"x3 ea     Plantar fascia stretch         Mini squat at rail   2x10 at rail 2x10 at rail 2x10 at rail with      LAQ          SLR flex/abd          Bridges          Prone PF/DF         MMT/ROM reassess LS-10'        step-up fwd 2x10 4" step  6" step 2x10  W/ high knee 2x10   8" W/ high knee 1x15 R/L Step-up lateral 2x10 4" step  6" step 2x10   6" step 2x10      Step-down 2x10 4" step   6" step 2x10  8" 1x15 R/L                                Ther Activity                           Gait Training         Progression of gait off AD per protocol                  Modalities

## 2022-01-31 ENCOUNTER — OFFICE VISIT (OUTPATIENT)
Dept: PHYSICAL THERAPY | Facility: CLINIC | Age: 52
End: 2022-01-31
Payer: COMMERCIAL

## 2022-01-31 DIAGNOSIS — Z98.890 STATUS POST OSTEOTOMY: ICD-10-CM

## 2022-01-31 DIAGNOSIS — S86.111D: ICD-10-CM

## 2022-01-31 DIAGNOSIS — M21.41 PES PLANOVALGUS, ACQUIRED, RIGHT: Primary | ICD-10-CM

## 2022-01-31 PROCEDURE — 97110 THERAPEUTIC EXERCISES: CPT

## 2022-01-31 NOTE — PROGRESS NOTES
Daily Note     Today's date: 2022  Patient name: Mathew Henley  : 1970  MRN: [id number]  Referring provider: Angelique Leos MD  Dx:   Encounter Diagnosis     ICD-10-CM    1  Pes planovalgus, acquired, right  M21 41    2  Traumatic rupture of posterior tibial tendon, right, subsequent encounter  S86 111D    3  Status post osteotomy  Z98 890        Start Time: 1540  Stop Time: 6458  Total time in clinic (min): 35 minutes    Subjective: Patient arrived 10 min late  Patient reports feeling gradually better and better  Objective: See treatment diary below      Assessment: Tolerated treatment well and with continued challenge noted with exercises and activities  Patient demonstrated fatigue post treatment, exhibited good technique with therapeutic exercises and would benefit from continued PT      Plan: Continue per plan of care  Progress treatment as tolerated  Precautions: MS, hx L wrist fx (s/p ORIF), progression of WB per MD protocol    HEP:   Access Code: [email or web address]  URL: [email or web address]  Date: 2021  Updated: 2022  Prepared by: Jasson Dumont    Exercises  · Seated Ankle Alphabet - 1 x daily - 7 x weekly - 1 sets - 10 reps  · Seated Ankle Circles - 1 x daily - 7 x weekly - 2 sets - 10 reps  · Seated Calf Towel Stretch - 1 x daily - 7 x weekly - 1 sets - 3 reps - 20 sec hold  · Long Sitting Ankle Plantar Flexion with Resistance - 1 x daily - 7 x weekly - 2 sets - 20 reps  · Ankle Dorsiflexion with Resistance - 1 x daily - 7 x weekly - 2 sets - 20 reps  · Ankle Eversion with Resistance - 1 x daily - 7 x weekly - 2 sets - 20 reps  · Ankle Inversion with Resistance - 1 x daily - 7 x weekly - 2 sets - 20 reps  · Sit to Stand without Arm Support - 1 x daily - 7 x weekly - 2 sets - 10 reps  · Gastroc Stretch on Wall - 2 x daily - 7 x weekly - 1 sets - 3 reps - 20 sec hold  · Single Leg Stance - 1 x daily - 7 x weekly - 1 sets - 10 reps - 5-10 sec hold  · Step Up - 1 x daily - 7 x weekly - 2 sets - 10 reps  · Lateral Step Ups - 1 x daily - 7 x weekly - 2 sets - 10 reps  · Standing Heel Raises - 1 x daily - 7 x weekly - 2 sets - 10 reps            Date 1/5/22 1/19/2022 1/20/2022 1/25/2022 1/27/2022 1/31/22   Visit # 7 8 9 10 11  12            Manual   5'       Scar mobs   RR       STM to plantar fascia                           Neuro Re-Ed 5' 15' 5'  5'  5'   Standing circles on round balance board 20x2 CW/CCW 20x2 CW/CCW 20x cw/ccw  20x cw/ccw   20x CW/CCW R ankle   Stance on foam  30" x1        Stance on foam EC   30" x2       Alt foot taps onto step  6" step 1x10        Tandem walk near rail  3 laps at rail 3 laps  5 laps      WB AP/ML   20x ea   No UE support 20x ea                                         Ther Ex 35' 25' 30'  40' 40' 30'   recumbent bike  5'  5' 5'  5' 5'  5' L3   Heel raises/TR  20x 20x  HR off step 2x10  HR off step 1x10 and   up with B/L then down w/ wt shift to R 2x10  Up with 2 down with R 2x10     Seated leg press   B/L 40# 2x10   Uni 15# 2x10 R B/L 40# 2x12   Uni 15# 2x12 R B/L 40# 2x10   Uni 15# 2x10 R B/L 40# 2x15  Uni 15# 2x15 R  B/L 60# 2x15, uni 30# 2x15 R   Supine uni HR on leg press      30# 2x10  R LE 30# 2x10   Side stepping TB @ ankles   On foam beam 3 laps at rail        Ankle TB x4 ways         Sit to stand   Low mat eccentric control 2x10   Low mat eccentric control 2x10     Ankle alphabet         Ankle circles         gastroc stretch seated Standing 20"x3 Standing 15"x5 Standing 15"x5  Standing 30" x4 On slant board  & soleus stretch 20"x3 ea     Plantar fascia stretch         Mini squat at rail   2x10 at rail 2x10 at rail 2x10 at rail with      LAQ          SLR flex/abd          Bridges          Prone PF/DF         MMT/ROM reassess LS-10'        step-up fwd 2x10 4" step  6" step 2x10  W/ high knee 2x10   8" W/ high knee 1x15 R/L     Step-up lateral 2x10 4" step  6" step 2x10   6" step 2x10   8" step 2x10   Step-down 2x10 4" step 6" step 2x10  8" 1x15 R/L  8" step 2x10                              Ther Activity                           Gait Training         Progression of gait off AD per protocol                  Modalities

## 2022-02-03 ENCOUNTER — OFFICE VISIT (OUTPATIENT)
Dept: PHYSICAL THERAPY | Facility: CLINIC | Age: 52
End: 2022-02-03
Payer: COMMERCIAL

## 2022-02-03 DIAGNOSIS — Z98.890 STATUS POST OSTEOTOMY: ICD-10-CM

## 2022-02-03 DIAGNOSIS — M21.41 PES PLANOVALGUS, ACQUIRED, RIGHT: Primary | ICD-10-CM

## 2022-02-03 DIAGNOSIS — S86.111D: ICD-10-CM

## 2022-02-03 PROCEDURE — 97110 THERAPEUTIC EXERCISES: CPT

## 2022-02-03 NOTE — PROGRESS NOTES
Daily Note     Today's date: 2/3/2022  Patient name: Javon Mcwilliams  : 1970  MRN: [id number]  Referring provider: Toyin Cuevas MD  Dx:   Encounter Diagnosis     ICD-10-CM    1  Pes planovalgus, acquired, right  M21 41    2  Traumatic rupture of posterior tibial tendon, right, subsequent encounter  S86 111D    3  Status post osteotomy  Z98 890                   Subjective: pt has no new complaints on arrival to session       Objective: See treatment diary below      Assessment: Tolerated treatment well  Pt fatigued post session  Challenged with ankle strengthening  Able to progress resistance with SL HR today  Patient would benefit from continued PT      Plan: Continue per plan of care  Precautions: MS, hx L wrist fx (s/p ORIF), progression of WB per MD protocol    HEP:   Access Code: [email or web address]  URL: [email or web address]  Date: 2021  Updated: 2022  Prepared by: Luis Bautista    Exercises  · Seated Ankle Alphabet - 1 x daily - 7 x weekly - 1 sets - 10 reps  · Seated Ankle Circles - 1 x daily - 7 x weekly - 2 sets - 10 reps  · Seated Calf Towel Stretch - 1 x daily - 7 x weekly - 1 sets - 3 reps - 20 sec hold  · Long Sitting Ankle Plantar Flexion with Resistance - 1 x daily - 7 x weekly - 2 sets - 20 reps  · Ankle Dorsiflexion with Resistance - 1 x daily - 7 x weekly - 2 sets - 20 reps  · Ankle Eversion with Resistance - 1 x daily - 7 x weekly - 2 sets - 20 reps  · Ankle Inversion with Resistance - 1 x daily - 7 x weekly - 2 sets - 20 reps  · Sit to Stand without Arm Support - 1 x daily - 7 x weekly - 2 sets - 10 reps  · Gastroc Stretch on Wall - 2 x daily - 7 x weekly - 1 sets - 3 reps - 20 sec hold  · Single Leg Stance - 1 x daily - 7 x weekly - 1 sets - 10 reps - 5-10 sec hold  · Step Up - 1 x daily - 7 x weekly - 2 sets - 10 reps  · Lateral Step Ups - 1 x daily - 7 x weekly - 2 sets - 10 reps  · Standing Heel Raises - 1 x daily - 7 x weekly - 2 sets - 10 reps            Date 2/3/2022        Visit # 13                 Manual         Scar mobs         STM to plantar fascia                           Neuro Re-Ed         Standing circles on round balance board         Stance on foam         Stance on foam EC          Alt foot taps onto step         Tandem walk near rail         WB AP/ML  No UE support 20x ea                                             Ther Ex 45'        recumbent bike  L3x5'        Heel raises         Seated leg press  B/L 60#  Uni 40# 2x10 R         Supine uni HR on leg press  R LE 35# 2x10         Side stepping TB @ ankles  TB around forefoot RTB 20'x2         Ankle TB x4 ways 3 way GTB 2x10 ea        Sit to stand         Ankle alphabet         Ankle circles         gastroc stretch seated On slant board  & soleus stretch 20"x2 ea         Plantar fascia stretch         Mini squat at rail  W/ feet on wedge 2x10         LAQ          SLR flex/abd          Bridges          Prone PF/DF         MMT/ROM reassess         step-up fwd 8" On foam 1x15 R         Step-up lateral 8"        Step-down 8"                                    Ther Activity                           Gait Training         Progression of gait off AD per protocol                  Modalities

## 2022-02-08 ENCOUNTER — OFFICE VISIT (OUTPATIENT)
Dept: PHYSICAL THERAPY | Facility: CLINIC | Age: 52
End: 2022-02-08
Payer: COMMERCIAL

## 2022-02-08 DIAGNOSIS — M21.41 PES PLANOVALGUS, ACQUIRED, RIGHT: Primary | ICD-10-CM

## 2022-02-08 DIAGNOSIS — Z98.890 STATUS POST OSTEOTOMY: ICD-10-CM

## 2022-02-08 DIAGNOSIS — S86.111D: ICD-10-CM

## 2022-02-08 PROCEDURE — 97140 MANUAL THERAPY 1/> REGIONS: CPT

## 2022-02-08 PROCEDURE — 97112 NEUROMUSCULAR REEDUCATION: CPT

## 2022-02-08 PROCEDURE — 97110 THERAPEUTIC EXERCISES: CPT

## 2022-02-08 NOTE — PROGRESS NOTES
Daily Note     Today's date: 2022  Patient name: Yamilka Pérez  : 1970  MRN: [id number]  Referring provider: Ruth Aguiar MD  Dx:   Encounter Diagnosis     ICD-10-CM    1  Pes planovalgus, acquired, right  M21 41    2  Traumatic rupture of posterior tibial tendon, right, subsequent encounter  S86 111D    3  Status post osteotomy  Z98 890        Start Time: 1150  Stop Time: 1430  Total time in clinic (min): 45 minutes    Subjective: Patient reports pain and soreness in ankle after wearing flats for an event that she had last night  Continued pain noted today, with improvement noted with compression sock donned again  Objective: See treatment diary below      Assessment: Tolerated treatment well and with decreased pain noted after session  Patient demonstrated fatigue post treatment, exhibited good technique with therapeutic exercises and would benefit from continued PT to address impairments  Improved symptoms noted following session, with continued significant need for strengthening and stabilization training in R ankle required  Plan: Continue per plan of care  Progress treatment as tolerated  Precautions: MS, hx L wrist fx (s/p ORIF), progression of WB per MD protocol    HEP:   Access Code: [email or web address]  URL: [email or web address]  Date: 2021  Updated: 2022  Prepared by: Cristin Gill    Exercises  · Seated Ankle Alphabet - 1 x daily - 7 x weekly - 1 sets - 10 reps  · Seated Ankle Circles - 1 x daily - 7 x weekly - 2 sets - 10 reps  · Seated Calf Towel Stretch - 1 x daily - 7 x weekly - 1 sets - 3 reps - 20 sec hold  · Long Sitting Ankle Plantar Flexion with Resistance - 1 x daily - 7 x weekly - 2 sets - 20 reps  · Ankle Dorsiflexion with Resistance - 1 x daily - 7 x weekly - 2 sets - 20 reps  · Ankle Eversion with Resistance - 1 x daily - 7 x weekly - 2 sets - 20 reps  · Ankle Inversion with Resistance - 1 x daily - 7 x weekly - 2 sets - 20 reps  · Sit to Stand without Arm Support - 1 x daily - 7 x weekly - 2 sets - 10 reps  · Gastroc Stretch on Wall - 2 x daily - 7 x weekly - 1 sets - 3 reps - 20 sec hold  · Single Leg Stance - 1 x daily - 7 x weekly - 1 sets - 10 reps - 5-10 sec hold  · Step Up - 1 x daily - 7 x weekly - 2 sets - 10 reps  · Lateral Step Ups - 1 x daily - 7 x weekly - 2 sets - 10 reps  · Standing Heel Raises - 1 x daily - 7 x weekly - 2 sets - 10 reps            Date 2/3/2022 2/8/22       Visit # 13 14                Manual  8'       Scar mobs         STM   R ankle,  Mobilization for improved ROM/dec pain                         Neuro Re-Ed  10'       Standing circles on round balance board  20x CW/CCW       Stance on foam  20"x4       Stance on foam EC   20"x4       Tandem walk near rail         WB AP/ML  No UE support 20x ea                                             Ther Ex 45' 20'       recumbent bike  L3x5' L2x5'       Heel raises  2x10       Seated leg press  B/L 60#  Uni 40# 2x10 R  B/L 70# 2x12, Uni 40# 2x12 R       Supine uni HR on leg press  R LE 35# 2x10         Side stepping TB @ ankles  TB around forefoot RTB 20'x2         Ankle TB x4 ways 3 way GTB 2x10 ea 4 way GTB 2x10 ea       Sit to stand  On foam 2x10 from low mat       gastroc stretch seated On slant board  & soleus stretch 20"x2 ea  On slant board & soleus stretch 20"x2 each       Plantar fascia stretch         Mini squat at rail  W/ feet on wedge 2x10         LAQ          SLR flex/abd          Bridges          Prone PF/DF         MMT/ROM reassess         step-up fwd 8" On foam 1x15 R         Step-up lateral 8"        Step-down 8"                                    Ther Activity                           Gait Training         Progression of gait off AD per protocol                  Modalities

## 2022-02-10 ENCOUNTER — EVALUATION (OUTPATIENT)
Dept: PHYSICAL THERAPY | Facility: CLINIC | Age: 52
End: 2022-02-10
Payer: COMMERCIAL

## 2022-02-10 DIAGNOSIS — Z98.890 STATUS POST OSTEOTOMY: ICD-10-CM

## 2022-02-10 DIAGNOSIS — M21.41 PES PLANOVALGUS, ACQUIRED, RIGHT: Primary | ICD-10-CM

## 2022-02-10 DIAGNOSIS — S86.111D: ICD-10-CM

## 2022-02-10 PROCEDURE — 97110 THERAPEUTIC EXERCISES: CPT

## 2022-02-10 PROCEDURE — 97140 MANUAL THERAPY 1/> REGIONS: CPT

## 2022-02-10 NOTE — PROGRESS NOTES
PT Re-Evaluation     Today's date: 2/10/2022  Patient name: Diaz Jay  : 1970  MRN: [id number]  Referring provider: Irma Johnson MD  Dx:   Encounter Diagnosis     ICD-10-CM    1  Pes planovalgus, acquired, right  M21 41    2  Traumatic rupture of posterior tibial tendon, right, subsequent encounter  S86 111D    3  Status post osteotomy  Z98 890        Start Time: 1535  Stop Time: 5739  Total time in clinic (min): 40 minutes    Assessment  Assessment details: 2/10/22 Patient is progressing well with functional mobility, gait, and balance, with continued deficits noted in all three areas still  Patient had increased pain this week, due to wearing shoes without significant support for an event that she had to attend  Isolated edema noted at lateral malleolus which is improved from prior session, but continues to be increased compared to previously  Initiated kinesiotaping to assist with edema management in that area  Patient requires continued skilled PT to address functional impairments in gait, balance, and LE proprioception, with significant differences still noted from L to R sides  Anticipate patient to continue to progress well over the next two months toward goals and to achieve maximal return to prior level of function  22: Patient has been consistently attending PT over the past four weeks, with improvements noted in ROM, strength, decreased pain, and improving gait pattern  Patient has progressed to ambulating in her shoe nearly the entire day without significant increased pain  Patient will benefit from continued skilled PT to address impairments remaining in strength, eccentric control, gait pattern, stairs and uneven surface negotiation for full return to prior level of function  Patient remains highly motivated for recovery and return to playing with her grandchildren      Patient is a 46year old female who presented to OPPT therapy for evaluation today s/p surgical repair of posterior tibial tendon rupture with osteotomies and flat foot repair performed as well  Patient is highly motivated for recovery and excited to get started with PT  Patient presents with impairments including decreased ROM, impaired strength, decreased functional use of R LE, and abnormal gait pattern using an AD and CAM boot at this time  Patient will benefit from skilled PT to address functional impairments and promote return to prior level of function with decreased pain  Impairments: abnormal gait, abnormal or restricted ROM, impaired balance, impaired physical strength and pain with function  Functional limitations: impaired stairs negotiation/control, difficulty squatting down, has significant restriction in return to yoga, although she has initiated some gentle practice at this time  Symptom irritability: lowUnderstanding of Dx/Px/POC: excellent  Goals  ST weeks (1/10/22)  1  Improve ROM to within 5 degrees of opposite side for increased mobility in ankle  MET  2  Progress to WBAT as per MD protocol  MET  3  Independent with initial HEP  MET  4  Return to stair negotiation with step-to gait pattern at home for increased quality of life  MET    LT weeks (22) Extended x 4 weeks due to set-back, rate of progress  1  Independent with comprehensive HEP-progressing  2  Independent with reciprocal stair negotiation with minimal use of railing for return to prior level of function  Progressing, improved with ascending, still difficulty with descending  3  Able to walk 2 miles without increased pain at self-selected pace for return to prior functional tasks  Progressing  4  Initiate return to yoga for exercise and stress management  Partially met  5  Ambulate with near-normal gait pattern without AD for return to independence  Progressing  6  SLS >15 seconds on R LE for decreased risk of falls, improved balance and ankle stability  Progressing        Plan  Patient would benefit from: PT tricia and skilled physical therapy  Planned modality interventions: cryotherapy and unattended electrical stimulation  Planned therapy interventions: joint mobilization, manual therapy, balance/weight bearing training, neuromuscular re-education, patient education, strengthening, stretching, therapeutic activities, therapeutic exercise, gait training, home exercise program and coordination  Frequency: 2-3x per week  Duration in weeks: 8  Plan of Care beginning date: 2/10/2022  Plan of Care expiration date: 2022  Treatment plan discussed with: patient        Subjective Evaluation    History of Present Illness  Date of surgery: 10/20/2021  Mechanism of injury: surgery  Mechanism of injury: Patient sustained a fall last year with L wrist fracture and onset of R ankle pain at that time  Patient underwent skilled PT wtihout resolution of the problem at this time  Patient was finally identified to have a posterior tibial tendon rupture, with pt now s/p surgical repair of posterior tibial tendon rupture with osteotomies and flat foot repair performed as well  Patient was referred to OP physical therapy by Dr Donavan Templeton with protocol present for return to [facility] over several weeks  Quality of life: good    Pain  Current pain ratin  At best pain ratin  At worst pain ratin (with too much activity earlier this week)  Location: R lateral ankle  Quality: dull ache, tight and discomfort  Relieving factors: ice and rest  Aggravating factors: walking and stair climbing  Progression: improved    Social Support  Steps to enter house: yes  2  Stairs in house: yes   14  Lives in: multiple-level home  Lives with: significant other    Exercise history: Walking prior to surgery a couple times a week  Watched grandkids every day  Prior to injury, performing yoga practice several times a week      Diagnostic Tests  Abnormal x-ray: post-op, all xrays show good healing    Treatments  Previous treatment: physical therapy and occupational therapy (PT for R ankle, OT for L wrist)  Current treatment: physical therapy  Patient Goals  Patient goals for therapy: decreased edema, decreased pain, improved balance, return to sport/leisure activities and increased strength  Patient goal: to return to doing yoga, watching the grandchildren again        Objective  ROM:   L  R  R  R     12/9/21 12/9/21 1/5/22  2/10/22  Ankle dorsiflexion 5/10  -6*  0/5  3/8  Ankle Inversion  50  33  40  35  Ankle Eversion 17  0*/5*  5/10  3/10    MMT:    L  R  R  R     12/9/21 12/9/21 1/5/22  2/10/22  Hip flexion(SLR) 4/5  4/5  4+/5  4+/5  Hip abduction   4/5  4+/5  4/5  4+/5  Hip extension  4/5  4+/5  4+/5  4+/5  Knee flexion (prone)    4/5  4/5  4+/5  4+/5  Knee extension 5/5  4+/5  5/5   5/5  Ankle dorsiflexion 5/5  4-/5  5/5  4+/5   Ankle inversion  4+/5  4-/5  4+/5  4+/5  Ankle eversion  4+/5  3+/5*  4/5  4/5    Transfers:   2/10/22: Independent with transfers  Able to perform sit to stand from low mat without significant compensations noted  Able to get up off the couch without difficulty  1/5/22: Independent with functional transfers  Improved ability to perform with near-equal LE WB   12/9/21: Able to perform sit to stand transfers, with use of arms to support herself  Reports difficulty getting off her couch due to depth of couch rather than her foot  Gait:  2/10/22: Ambulating without AD with improved step length, gait speed, and heel strike  Continued mild decreased push-off demonstrated with patient able to complete 5 minutes on treadmill at 2 0 mph, with increased ankle soreness noted after  1/5/22: Ambulating without AD with sneaker on, with improved sequencing, wt shifting and heel strike  Patient reported being sore after performing multiple times up down the stairs yesterday for laundry  Mild increased hip abduction noted, with improvements noted with cues  Feels that she is walking pretty normally on even surfaces    12/9/21: Ambulating with RW with decreased R WB, increased time required, and use of B/L UE to offload foot per Dr Emma Roberts protocol  Adjusted walker height today with improved posture noted  Stairs:   2/10/22: Able to ascend up to 8" step with mild decreased wt shift onto R LE  From 6" step, patient able to descend with R foot on step with fair control, but with 8" step, patient noted to still compensate by placing forefoot off edge of step to decrease DF ROM needs and eccentric control required  With cues, patient able to perform step-down with improved form, but still with hesitation and limitations in control  1/5/22: Patient noted to have increasing ability to perform stairs with step-to gait pattern primarily utilized  Upon assessment on 4" step today, patient able to ascend step with R LE fairly well, but with poor control and form descending, which improved with cues/repetition  Patient still staying on first floor at this time  12/9/21: Minimally using stairs at this time, only to enter/exit home  Not performing steps to bedroom, moved bedroom to first floor  Balance:   Single leg stance: L: 34 36 sec  R: 1 28 sec        Precautions: MS, hx L wrist fx (s/p ORIF), progression of WB per MD protocol    Access Code: [email or web address]  URL: [email or web address]  Date: 12/09/2021  Updated: 01/05/2022  Prepared by: Chau Mehta    Exercises  · Seated Ankle Alphabet - 1 x daily - 7 x weekly - 1 sets - 10 reps  · Seated Ankle Circles - 1 x daily - 7 x weekly - 2 sets - 10 reps  · Seated Calf Towel Stretch - 1 x daily - 7 x weekly - 1 sets - 3 reps - 20 sec hold  · Long Sitting Ankle Plantar Flexion with Resistance - 1 x daily - 7 x weekly - 2 sets - 20 reps  · Ankle Dorsiflexion with Resistance - 1 x daily - 7 x weekly - 2 sets - 20 reps  · Ankle Eversion with Resistance - 1 x daily - 7 x weekly - 2 sets - 20 reps  · Ankle Inversion with Resistance - 1 x daily - 7 x weekly - 2 sets - 20 reps  · Sit to Stand without Arm Support - 1 x daily - 7 x weekly - 2 sets - 10 reps  · Gastroc Stretch on Wall - 2 x daily - 7 x weekly - 1 sets - 3 reps - 20 sec hold  · Single Leg Stance - 1 x daily - 7 x weekly - 1 sets - 10 reps - 5-10 sec hold  · Step Up - 1 x daily - 7 x weekly - 2 sets - 10 reps  · Lateral Step Ups - 1 x daily - 7 x weekly - 2 sets - 10 reps  · Standing Heel Raises - 1 x daily - 7 x weekly - 2 sets - 10 reps            Date 2/3/2022 2/8/22 2/10/22      Visit # 13 14                Manual  8' 10'      Scar mobs         STM   R ankle,  Mobilization for improved ROM/dec pain 5' + taping for edema management over lateral ankle                        Neuro Re-Ed  10'       Standing circles on round balance board  20x CW/CCW       Stance on foam  20"x4       Stance on foam EC   20"x4       Tandem walk near rail         WB AP/ML  No UE support 20x ea         Single leg stance   R/L assessed                                 Ther Ex 45' 20' 30'      Treadmill   5' @ 2 0 mph to start      recumbent bike  L3x5' L2x5'       Heel raises  2x10       Seated leg press  B/L 60#  Uni 40# 2x10 R  B/L 70# 2x12, Uni 40# 2x12 R B/L 80# 2x15  Uni 45# 2x15      Supine uni HR on leg press  R LE 35# 2x10   R LE 35# 2x10      Side stepping TB @ ankles  TB around forefoot RTB 20'x2         Ankle TB x4 ways 3 way GTB 2x10 ea 4 way GTB 2x10 ea       Sit to stand  On foam 2x10 from low mat 2x10 on foam      gastroc stretch seated On slant board  & soleus stretch 20"x2 ea  On slant board & soleus stretch 20"x2 each       Plantar fascia stretch         Mini squat at rail  W/ feet on wedge 2x10         LAQ          SLR flex/abd          Bridges          Prone PF/DF         MMT/ROM/ reassess   LS      step-up fwd 8" On foam 1x15 R   15x R step-up and over      Step-up lateral 8"  2x10 8" step      Step-down 8"                                    Ther Activity                           Gait Training         Progression of gait off AD per protocol Modalities

## 2022-02-15 ENCOUNTER — OFFICE VISIT (OUTPATIENT)
Dept: PHYSICAL THERAPY | Facility: CLINIC | Age: 52
End: 2022-02-15
Payer: COMMERCIAL

## 2022-02-15 DIAGNOSIS — Z98.890 STATUS POST OSTEOTOMY: ICD-10-CM

## 2022-02-15 DIAGNOSIS — M21.41 PES PLANOVALGUS, ACQUIRED, RIGHT: Primary | ICD-10-CM

## 2022-02-15 DIAGNOSIS — G62.9 NEUROPATHY: ICD-10-CM

## 2022-02-15 DIAGNOSIS — S86.111D: ICD-10-CM

## 2022-02-15 PROCEDURE — 97112 NEUROMUSCULAR REEDUCATION: CPT

## 2022-02-15 PROCEDURE — 97110 THERAPEUTIC EXERCISES: CPT

## 2022-02-15 RX ORDER — GABAPENTIN 100 MG/1
CAPSULE ORAL
Qty: 60 CAPSULE | Refills: 1 | Status: SHIPPED | OUTPATIENT
Start: 2022-02-15 | End: 2022-05-06

## 2022-02-15 NOTE — PROGRESS NOTES
Daily Note     Today's date: 2/15/2022  Patient name: Diaz Jay  : 1970  MRN: [id number]  Referring provider: Irma Johnson MD  Dx:   Encounter Diagnosis     ICD-10-CM    1  Pes planovalgus, acquired, right  M21 41    2  Traumatic rupture of posterior tibial tendon, right, subsequent encounter  S86 111D    3  Status post osteotomy  Z98 890        Start Time: 1150  Stop Time: 0576  Total time in clinic (min): 45 minutes    Subjective: Patient reports that the kinesiotaping helped her swelling in the lateral aspect of the ankle  Requesting to repeat taping today  Assessment: Tolerated treatment well and without increased pain  Identified deep ankle DF stretch with foot on chair as an effective stretch for her to carryover at home  Increased overall mobility and improving gait demonstrated    Patient demonstrated fatigue post treatment, exhibited good technique with therapeutic exercises and would benefit from continued PT      Plan: Continue per plan of care  Progress treatment as tolerated  Precautions: MS, hx L wrist fx (s/p ORIF), progression of WB per MD protocol    Access Code: [email or web address]  URL: [email or web address]  Date: 2021  Updated: 2022  Prepared by: Cassandra Bi    Exercises  · Seated Ankle Alphabet - 1 x daily - 7 x weekly - 1 sets - 10 reps  · Seated Ankle Circles - 1 x daily - 7 x weekly - 2 sets - 10 reps  · Seated Calf Towel Stretch - 1 x daily - 7 x weekly - 1 sets - 3 reps - 20 sec hold  · Long Sitting Ankle Plantar Flexion with Resistance - 1 x daily - 7 x weekly - 2 sets - 20 reps  · Ankle Dorsiflexion with Resistance - 1 x daily - 7 x weekly - 2 sets - 20 reps  · Ankle Eversion with Resistance - 1 x daily - 7 x weekly - 2 sets - 20 reps  · Ankle Inversion with Resistance - 1 x daily - 7 x weekly - 2 sets - 20 reps  · Sit to Stand without Arm Support - 1 x daily - 7 x weekly - 2 sets - 10 reps  · Gastroc Stretch on Wall - 2 x daily - 7 x weekly - 1 sets - 3 reps - 20 sec hold  · Single Leg Stance - 1 x daily - 7 x weekly - 1 sets - 10 reps - 5-10 sec hold  · Step Up - 1 x daily - 7 x weekly - 2 sets - 10 reps  · Lateral Step Ups - 1 x daily - 7 x weekly - 2 sets - 10 reps  · Standing Heel Raises - 1 x daily - 7 x weekly - 2 sets - 10 reps            Date 2/3/2022 2/8/22 2/10/22 2/15/22     Visit # 13 14 15 16     Authorized visit # 13/24 14/24 15/24 16/24                       Manual  8' 10' 5'     Scar mobs         STM   R ankle,  Mobilization for improved ROM/dec pain 5' + taping for edema management over lateral ankle kinesiotaping to R lateral ankle, basketweave                       Neuro Re-Ed  10'  10'     Standing circles on round balance board  20x CW/CCW       Stance on foam  20"x4       Stance on foam EC   20"x4       Tandem walk near rail         WB AP/ML  No UE support 20x ea    A/P and M/L taps, 20x each min UE A     Single leg stance   R/L assessed      Cone taps    3 cones, 10x R/L                       Ther Ex 45' 20' 30' 25'     Treadmill   5' @ 2 0 mph to start 5' at 2 2 mph to start     recumbent bike  L3x5' L2x5'       Heel raises  2x10       Seated leg press  B/L 60#  Uni 40# 2x10 R  B/L 70# 2x12, Uni 40# 2x12 R B/L 80# 2x15  Uni 45# 2x15 B/L 80# 2x15 uni 45# 2x15 R/L     Supine uni HR on leg press  R LE 35# 2x10   R LE 35# 2x10       Side stepping TB @ ankles  TB around forefoot RTB 20'x2         Ankle TB x4 ways 3 way GTB 2x10 ea 4 way GTB 2x10 ea       Sit to stand  On foam 2x10 from low mat 2x10 on foam      gastroc stretch seated On slant board  & soleus stretch 20"x2 ea  On slant board & soleus stretch 20"x2 each  On slant board gastroc & soleus, also deep DF stretch w/ R foot on mat 5x10" hold     Plantar fascia stretch         Mini squat at rail  W/ feet on wedge 2x10         MMT/ROM/ reassess   LS      step-up fwd 8" On foam 1x15 R   15x R step-up and over 2x10 fwd, step-up and over 6" step, controlled descent Step-up lateral 8"  2x10 8" step Lateral tap-downs to heel, 6" step 2x10     Step-down 8"                                    Ther Activity                           Gait Training         Progression of gait off AD per protocol                  Modalities

## 2022-02-17 ENCOUNTER — TELEPHONE (OUTPATIENT)
Dept: NEUROLOGY | Facility: CLINIC | Age: 52
End: 2022-02-17

## 2022-02-17 ENCOUNTER — APPOINTMENT (OUTPATIENT)
Dept: PHYSICAL THERAPY | Facility: CLINIC | Age: 52
End: 2022-02-17
Payer: COMMERCIAL

## 2022-02-22 ENCOUNTER — TELEPHONE (OUTPATIENT)
Dept: PHYSICAL THERAPY | Facility: CLINIC | Age: 52
End: 2022-02-22

## 2022-02-22 ENCOUNTER — APPOINTMENT (OUTPATIENT)
Dept: PHYSICAL THERAPY | Facility: CLINIC | Age: 52
End: 2022-02-22
Payer: COMMERCIAL

## 2022-02-22 ENCOUNTER — OFFICE VISIT (OUTPATIENT)
Dept: NEUROLOGY | Facility: CLINIC | Age: 52
End: 2022-02-22
Payer: COMMERCIAL

## 2022-02-22 VITALS
SYSTOLIC BLOOD PRESSURE: 173 MMHG | HEART RATE: 81 BPM | DIASTOLIC BLOOD PRESSURE: 100 MMHG | BODY MASS INDEX: 35.55 KG/M2 | WEIGHT: 240 LBS | HEIGHT: 69 IN | TEMPERATURE: 98 F

## 2022-02-22 DIAGNOSIS — D32.9 MENINGIOMA (HCC): ICD-10-CM

## 2022-02-22 DIAGNOSIS — I10 PRIMARY HYPERTENSION: ICD-10-CM

## 2022-02-22 DIAGNOSIS — G35 MULTIPLE SCLEROSIS (HCC): Primary | ICD-10-CM

## 2022-02-22 PROCEDURE — 99214 OFFICE O/P EST MOD 30 MIN: CPT | Performed by: PSYCHIATRY & NEUROLOGY

## 2022-02-22 NOTE — TELEPHONE ENCOUNTER
Pt called to cancel her appt for today, she just left her Neurologist appt today and he is sending her to her primary due to her blood pressure being very high  rescheduled to tomorrow night

## 2022-02-22 NOTE — PROGRESS NOTES
Return NeuroOutpatient Note        Jeanne Woo  [id number]  4 y o   1970       Multiple Sclerosis        History obtained from:  Patient     HPI/Subjective:    Jeanne Woo is a 47 yo F with PMH of MS presents as f/u  She was diagnosed in 2003  Per my previous history, patient took copaxone for 2 years  She was getting bruises at each site and eventually tried natural, herbal therapy  She was noticing forgetfulness, word finding difficulty  She was thought to have Lyme and still wasn't getting better after abx so imaging finally showed demyelinating lesions  She had LP done to confirm it  Stress, physical exhaustion can be triggers  She can get fatigue easily  She avoids uneven surfaces  She used to get paresthesia but hasn't in a long time  Stress can make her eyes fatigued  Her most recent MRI brain was prior to 2020  Lesions were stable  There was no worsening per her prior neurologist    Patient has been doing well except for elevated BP today  She has no deficits  Her only limitation is if on uneven surface, can lose balance  Patient had MRI brain IAC in sept of 2021 which revealed prior lesions but no significant change  She had ankle surgery in right foot and since she has limited herself physically  She was noted to have elevated bp at that time but though it was from pain  She was on provigil for fatigue  She's not on it anymore  She's on neurontin 100mg bid  She takes mobic as needed  She had ct brain in May which had revealed 6mm hyperdense partially calcified dural based nodule in right anterior interhemispheric falx, small meningioma         Past Medical History:   Diagnosis Date    Ankle arthritis     right is having surgery soon for this    Cancer [facility]) 2007    cervical    Disease of thyroid gland     hypo    GERD (gastroesophageal reflux disease)     History of transfusion     age 2    Hyperlipidemia     MS (congenital mitral stenosis)     patient has multiple sclerosis     Multiple sclerosis ([facility] )      Social History     Socioeconomic History    Marital status:      Spouse name: Not on file    Number of children: Not on file    Years of education: Not on file    Highest education level: Not on file   Occupational History    Not on file   Tobacco Use    Smoking status: Former Smoker     Packs/day: 0 50     Years: 45 00     Pack years: 22 50     Types: Cigarettes     Quit date: 4/10/2020     Years since quittin 8    Smokeless tobacco: Never Used   Vaping Use    Vaping Use: Never used   Substance and Sexual Activity    Alcohol use: Yes     Comment: 1-2 drinks once per week    Drug use: Never    Sexual activity: Yes   Other Topics Concern    Not on file   Social History Narrative    Not on file     Social Determinants of Health     Financial Resource Strain: Not on file   Food Insecurity: Not on file   Transportation Needs: Not on file   Physical Activity: Not on file   Stress: Not on file   Social Connections: Not on file   Intimate Partner Violence: Not on file   Housing Stability: Not on file     Family History   Problem Relation Age of Onset    No Known Problems Mother      No Known Allergies  Current Outpatient Medications on File Prior to Visit   Medication Sig Dispense Refill    azelastine (ASTELIN) 0 1 % nasal spray 2 sprays into each nostril daily      cyclobenzaprine (FLEXERIL) 10 mg tablet Take 10 mg by mouth daily at bedtime       estradiol (ESTRACE) 1 mg tablet Take 1 mg by mouth daily at bedtime      famotidine (PEPCID) 10 mg tablet Take 10 mg by mouth daily at bedtime      fexofenadine (ALLEGRA) 60 MG tablet Take 60 mg by mouth daily at bedtime       fluticasone (FLONASE) 50 mcg/act nasal spray 50 mcg into each nostril daily      gabapentin (NEURONTIN) 100 mg capsule TAKE 1 CAPSULE (100 MG TOTAL) BY MOUTH TWO (TWO) TIMES A DAY 60 capsule 1    levothyroxine 50 mcg tablet Take 50 mcg by mouth daily      montelukast (SINGULAIR) 10 mg tablet Take 10 mg by mouth daily at bedtime       omeprazole (PriLOSEC) 40 MG capsule TAKE 1 CAPSULE (40 MG) BY MOUTH DAILY BEFORE BREAKFAST      rosuvastatin (CRESTOR) 10 MG tablet Take 10 mg by mouth daily at bedtime       aspirin (ECOTRIN) 325 mg EC tablet Take 1 tablet (325 mg total) by mouth 2 (two) times a day (Patient not taking: Reported on 1/18/2022 ) 84 tablet 0    ondansetron (ZOFRAN) 4 mg tablet Take 1 tablet (4 mg total) by mouth every 8 (eight) hours as needed for nausea or vomiting (Patient not taking: Reported on 11/30/2021 ) 20 tablet 0     No current facility-administered medications on file prior to visit  Review of Systems   Refer to positive review of systems in HPI  Review of Systems    Constitutional- No fever  Eyes- No visual change  ENT- Hearing normal  CV- No chest pain  Resp- No Shortness of breath  GI- No diarrhea  - Bladder normal  MS- No Arthritis   Skin- No rash  Psych- No depression  Endo- No DM  Heme- No nodes    Vitals:    02/22/22 0956   BP: (!) 173/100   BP Location: Left arm   Patient Position: Sitting   Cuff Size: Standard   Pulse: 81   Temp: 98 °F (36 7 °C)   TempSrc: Oral   Weight: 109 kg (240 lb)   Height: 5' 9" (1 753 m)     Repeat: similar reading       PHYSICAL EXAM:  Appearance: No Acute Distress  Ophthalmoscopic: Disc Flat, Normal fundus  Mental status:  Orientation: Awake, Alert, and Orientedx3  Memory: Registation 3/3 Recall 3/3  Attention: normal  Knowledge: good  Language: No aphasia  Speech: No dysarthria  Cranial Nerves:  2 No Visual Defect on Confrontation, Pupils round, equal, reactive to light  3,4,6 Extraocular Movements Intact, no nystagmus  5 Facial Sensation Intact  7 No facial asymmetry  8 Intact hearing  9,10 Palate symmetric, normal gag  11 Good shoulder shrug  12 Tongue Midline  Gait: Stable  Coordination: No ataxia with finger to nose testing, and heel to shin  Sensory: Intact, Symmetric to pinprick, light touch, vibration, and joint position  Muscle Tone: Normal              Muscle exam:  Arm Right Left Leg Right Left   Deltoid 5/5 5/5 Iliopsoas 5/5 5/5   Biceps 5/5 5/5 Quads 5/5 5/5   Triceps 5/5 5/5 Hamstrings 5/5 5/5   Wrist Extension 5/5 5/5 Ankle Dorsi Flexion 5/5 5/5   Wrist Flexion 5/5 5/5 Ankle Plantar Flexion 5/5 5/5   Interossei 5/5 5/5 Ankle Eversion 5/5 5/5   APB 5/5 5/5 Ankle Inversion 5/5 5/5       Reflexes   RJ BJ TJ KJ AJ Plantars Guerra's   Right 2+ 2+ 2+ 2+ 2+ Downgoing Not present   Left 2+ 2+ 2+ 2+ 2+ Downgoing Not present     Personal review of  Labs:                    Diagnoses and all orders for this visit:      1  Multiple sclerosis (HCC)  Vitamin B12    Vitamin D 25 hydroxy    Vitamin B12    Vitamin D 25 hydroxy   2  Primary hypertension     3  Meningioma [facility])         Patient has remained clinically stable  Advised her to join gym or [employer] that allows her to exercise routinely  Will check pending vitamin levels to make sure they are normal   Will repeat imaging in 2023 for surveillance  Asked patient to contact her PCP today to address her BP and start of antihypertensive agent                 Total time of encounter:  30 min  More than 50% of the time was used in counseling and/or coordination of care  Extent of counseling and/or coordination of care        Mary Beth Silva MD  [facility]  [address]  [address]  [phone]

## 2022-02-23 ENCOUNTER — OFFICE VISIT (OUTPATIENT)
Dept: PHYSICAL THERAPY | Facility: CLINIC | Age: 52
End: 2022-02-23
Payer: COMMERCIAL

## 2022-02-23 DIAGNOSIS — Z98.890 STATUS POST OSTEOTOMY: ICD-10-CM

## 2022-02-23 DIAGNOSIS — S86.111D: ICD-10-CM

## 2022-02-23 DIAGNOSIS — M21.41 PES PLANOVALGUS, ACQUIRED, RIGHT: Primary | ICD-10-CM

## 2022-02-23 PROCEDURE — 97140 MANUAL THERAPY 1/> REGIONS: CPT

## 2022-02-23 PROCEDURE — 97110 THERAPEUTIC EXERCISES: CPT

## 2022-02-23 NOTE — PROGRESS NOTES
Daily Note     Today's date: 2022  Patient name: Patt   : 1970  MRN: [id number]  Referring provider: Cheyanne Mora MD  Dx:   Encounter Diagnosis     ICD-10-CM    1  Pes planovalgus, acquired, right  M21 41    2  Traumatic rupture of posterior tibial tendon, right, subsequent encounter  S86 111D    3  Status post osteotomy  Z98 890                   Subjective: pt reports that she still has a headache since yesterday wrapping around the top of her head, her MD put her on a medication for her BP yesterday and she has taken 2 doses since then  Reports she tried to use the prescribed orthotics and they hurt her a lot even after only 30 min of use and her toes were curling in her shoe  Objective: See treatment diary below  Resting BP on arrival to session: 180/110 BPM   BP taken by supervising PT: 180/100 BPM     10 min rest break: 170/98 BPM     Post session: 180/108 BPM       Assessment: Pt advised to call her MD before being able to resume higher level PT, pt called her MD while in the office, spoke with nurse and she feels she should not be doing any cardiovascular or high intensity therapy, okay to do stretching and gentle exercise  Plan: Continue per plan of care  Pt to cancel her appt tomorrow due to having authorized visits and she not being able to perform her regular level of therapy and can do these at home  Pt may stop in the clinic tomorrow to get her BP taken again after another dose of the prescribed meds  She is going to call to speak to the MD tomorrow to see if the meds should be working by now or if she needs a diff medication to lower her BP  Pt edu to return to the office that gave her the orthotics and have them fit them properly  Precautions: MS, hx L wrist fx (s/p ORIF), progression of WB per MD protocol    Access Code: [email or web address]  URL: [email or web address]  Date: 2021  Updated: 2022  Prepared by: Andrzej Rosa Karli    Exercises  · Seated Ankle Alphabet - 1 x daily - 7 x weekly - 1 sets - 10 reps  · Seated Ankle Circles - 1 x daily - 7 x weekly - 2 sets - 10 reps  · Seated Calf Towel Stretch - 1 x daily - 7 x weekly - 1 sets - 3 reps - 20 sec hold  · Long Sitting Ankle Plantar Flexion with Resistance - 1 x daily - 7 x weekly - 2 sets - 20 reps  · Ankle Dorsiflexion with Resistance - 1 x daily - 7 x weekly - 2 sets - 20 reps  · Ankle Eversion with Resistance - 1 x daily - 7 x weekly - 2 sets - 20 reps  · Ankle Inversion with Resistance - 1 x daily - 7 x weekly - 2 sets - 20 reps  · Sit to Stand without Arm Support - 1 x daily - 7 x weekly - 2 sets - 10 reps  · Gastroc Stretch on Wall - 2 x daily - 7 x weekly - 1 sets - 3 reps - 20 sec hold  · Single Leg Stance - 1 x daily - 7 x weekly - 1 sets - 10 reps - 5-10 sec hold  · Step Up - 1 x daily - 7 x weekly - 2 sets - 10 reps  · Lateral Step Ups - 1 x daily - 7 x weekly - 2 sets - 10 reps  · Standing Heel Raises - 1 x daily - 7 x weekly - 2 sets - 10 reps            Date 2/3/2022 2/8/22 2/10/22 2/15/22 2/23/2022    Visit # 13 14 15 16 17     Authorized visit # 13/24 14/24 15/24 16/24 17/24                      Manual  8' 10' 5' 15'     Scar mobs         STM   R ankle,  Mobilization for improved ROM/dec pain 5' + taping for edema management over lateral ankle kinesiotaping to R lateral ankle, basketweave kinesiotaping to R lateral ankle, basketweave    BP      Beginning, middle, end of session   Measures in objective section             Neuro Re-Ed  10'  10'     Standing circles on round balance board  20x CW/CCW       Stance on foam  20"x4       Stance on foam EC   20"x4       Tandem walk near rail     Fwd/bwd 20'x4     WB AP/ML  No UE support 20x ea    A/P and M/L taps, 20x each min UE A     Single leg stance   R/L assessed      Cone taps    3 cones, 10x R/L                       Ther Ex 45' 20' 30' 25' 20'     Treadmill   5' @ 2 0 mph to start 5' at 2 2 mph to start recumbent bike  L3x5' L2x5'       Heel raises  2x10   Off step 2x10     Seated leg press  B/L 60#  Uni 40# 2x10 R  B/L 70# 2x12, Uni 40# 2x12 R B/L 80# 2x15  Uni 45# 2x15 B/L 80# 2x15 uni 45# 2x15 R/L     Supine uni HR on leg press  R LE 35# 2x10   R LE 35# 2x10       Side stepping TB @ ankles  TB around forefoot RTB 20'x2         Ankle TB x4 ways 3 way GTB 2x10 ea 4 way GTB 2x10 ea       Sit to stand  On foam 2x10 from low mat 2x10 on foam      gastroc stretch seated On slant board  & soleus stretch 20"x2 ea  On slant board & soleus stretch 20"x2 each  On slant board gastroc & soleus, also deep DF stretch w/ R foot on mat 5x10" hold On slant board gastroc & soleus, also deep DF stretch w/ R foot on mat 5x10" hold     Plantar fascia stretch         Mini squat at rail  W/ feet on wedge 2x10         MMT/ROM/ reassess   LS      step-up fwd 8" On foam 1x15 R   15x R step-up and over 2x10 fwd, step-up and over 6" step, controlled descent     Step-up lateral 8"  2x10 8" step Lateral tap-downs to heel, 6" step 2x10     Step-down 8"         Pt edu      15'                       Ther Activity                           Gait Training         Progression of gait off AD per protocol                  Modalities

## 2022-02-24 ENCOUNTER — APPOINTMENT (OUTPATIENT)
Dept: PHYSICAL THERAPY | Facility: CLINIC | Age: 52
End: 2022-02-24
Payer: COMMERCIAL

## 2022-03-07 ENCOUNTER — TELEPHONE (OUTPATIENT)
Dept: PHYSICAL THERAPY | Facility: CLINIC | Age: 52
End: 2022-03-07

## 2022-03-07 NOTE — TELEPHONE ENCOUNTER
Called patient in regard to no future appts, pt reports she sees her MD tomorrow for her BP follow-up and will update us once she finishes with this appt in regards to her resuming PT

## 2022-03-09 LAB
25(OH)D3+25(OH)D2 SERPL-MCNC: 44.9 NG/ML (ref 30–100)
VIT B12 SERPL-MCNC: 334 PG/ML (ref 232–1245)

## 2022-03-16 ENCOUNTER — OFFICE VISIT (OUTPATIENT)
Dept: OTOLARYNGOLOGY | Facility: CLINIC | Age: 52
End: 2022-03-16
Payer: COMMERCIAL

## 2022-03-16 VITALS — TEMPERATURE: 97.5 F | HEIGHT: 69 IN | WEIGHT: 237 LBS | BODY MASS INDEX: 35.1 KG/M2

## 2022-03-16 DIAGNOSIS — H92.01 OTALGIA, RIGHT: ICD-10-CM

## 2022-03-16 DIAGNOSIS — M26.609 TMJ (TEMPOROMANDIBULAR JOINT SYNDROME): ICD-10-CM

## 2022-03-16 DIAGNOSIS — K21.9 LARYNGOPHARYNGEAL REFLUX (LPR): ICD-10-CM

## 2022-03-16 DIAGNOSIS — M54.2 NECK PAIN: ICD-10-CM

## 2022-03-16 DIAGNOSIS — R07.0 THROAT PAIN: ICD-10-CM

## 2022-03-16 DIAGNOSIS — R51.9 FACIAL PAIN: ICD-10-CM

## 2022-03-16 DIAGNOSIS — H93.11 RIGHT-SIDED TINNITUS: Primary | ICD-10-CM

## 2022-03-16 PROCEDURE — 99214 OFFICE O/P EST MOD 30 MIN: CPT | Performed by: NURSE PRACTITIONER

## 2022-03-16 RX ORDER — METHYLPREDNISOLONE 4 MG/1
TABLET ORAL
Qty: 1 EACH | Refills: 0 | Status: SHIPPED | OUTPATIENT
Start: 2022-03-16 | End: 2022-04-25 | Stop reason: ALTCHOICE

## 2022-03-16 RX ORDER — LISINOPRIL 5 MG/1
5 TABLET ORAL DAILY
COMMUNITY
Start: 2022-02-22 | End: 2022-06-30

## 2022-03-16 NOTE — ASSESSMENT & PLAN NOTE
Nasal congestion   Reviewed actual Ct scan images from 05/2021 indicating clear paranasal sinuses, turbinate hypertrophy, DNS, patchy inflammation bilateral ethmoids, narrow nasal passages  Options include Flonase, Astelin spray, Saline rinses (elías med sinus rinses)    Nasal vestibulitis   On exam bilateral nasal vestibule with crusting, abrasion  Recommend saline sprays and Antibiotic ointment to both nostrils 3 to 4 times per day for one month    LPR  Discussed throat related symptoms and history of reflux  Reviewed LPR causes, symptoms, and treatment options  Informed pt about inflammation caused by acid reflux impacting the vocal cords  Additional options include PPI, H 2 blockers, or GI consultation    Agreed to resume reflux medications - Omeprazole in morning and Pepcid at bedtime  Follow up for laryngoscopy      Right-sided tinnitus  Reviewed recurrent otalgia and possible causes   Discussed ETD, dental changes, vs ear processes  Noted crepitus of right TM joint with increased muscle tension bilaterally   No parotid swelling, no changes of TM or outer ear  No evidence of infection of either ear on exam today  Audiogram last visit indicating normal/borderline bilateral hearing, tymps type A     Discussed tinnitus and possible causes in presence of normal hearing including MS, TMJ syndrome, vs ear related changes    Discussed TMJ syndrome in detail   Reviewed options for treatment including soft diet, jaw rest, NSAIDs, warm compresses, oral steroids, massage, PT, oral appliance, or consultation with dentist    After discussion agreed to Medrol dose pack for otalgia  Follow up 4 to 6 weeks with laryngoscopy

## 2022-03-16 NOTE — PATIENT INSTRUCTIONS
Nasal congestion - Flonase, Astelin spray, Saline rinses (elías med sinus rinses)  Nasal Bleeding - called nasal vestibulitis - Antibiotic ointment to both nostrils 3 to 4 times per day for one month  TMJ syndrome - Reviewed options for treatment including soft diet, jaw rest, NSAIDs, warm compresses, massage, Physical therapy, oral appliance, or consultation with dentist  Medrol dose pack  Take Omeprazole mid morning  Pepcid at bedtime

## 2022-03-16 NOTE — PROGRESS NOTES
Assessment/Plan:    Otalgia, right  Nasal congestion   Reviewed actual Ct scan images from 05/2021 indicating clear paranasal sinuses, turbinate hypertrophy, DNS, patchy inflammation bilateral ethmoids, narrow nasal passages  Options include Flonase, Astelin spray, Saline rinses (elías med sinus rinses)    Nasal vestibulitis   On exam bilateral nasal vestibule with crusting, abrasion  Recommend saline sprays and Antibiotic ointment to both nostrils 3 to 4 times per day for one month    LPR  Discussed throat related symptoms and history of reflux  Reviewed LPR causes, symptoms, and treatment options  Informed pt about inflammation caused by acid reflux impacting the vocal cords  Additional options include PPI, H 2 blockers, or GI consultation    Agreed to resume reflux medications - Omeprazole in morning and Pepcid at bedtime  Follow up for laryngoscopy      Right-sided tinnitus  Reviewed recurrent otalgia and possible causes   Discussed ETD, dental changes, vs ear processes  Noted crepitus of right TM joint with increased muscle tension bilaterally   No parotid swelling, no changes of TM or outer ear  No evidence of infection of either ear on exam today  Audiogram last visit indicating normal/borderline bilateral hearing, tymps type A     Discussed tinnitus and possible causes in presence of normal hearing including MS, TMJ syndrome, vs ear related changes  Discussed TMJ syndrome in detail   Reviewed options for treatment including soft diet, jaw rest, NSAIDs, warm compresses, oral steroids, massage, PT, oral appliance, or consultation with dentist    After discussion agreed to Medrol dose pack for otalgia  Follow up 4 to 6 weeks with laryngoscopy         TMJ (temporomandibular joint syndrome)  On exam crepitus    Discussed TMJ syndrome in detail  Reviewed options for treatment including soft diet, jaw rest, NSAIDs, warm compresses, massage, oral appliance, or consultation with oral surgeon    Pt agreed with recommendations and will follow up as needed  Diagnoses and all orders for this visit:    Right-sided tinnitus    Otalgia, right  -     methylPREDNISolone 4 MG tablet therapy pack; Use as directed on package    Laryngopharyngeal reflux (LPR)    Throat pain  -     methylPREDNISolone 4 MG tablet therapy pack; Use as directed on package    Neck pain  -     methylPREDNISolone 4 MG tablet therapy pack; Use as directed on package    Facial pain  -     methylPREDNISolone 4 MG tablet therapy pack; Use as directed on package    TMJ (temporomandibular joint syndrome)  -     methylPREDNISolone 4 MG tablet therapy pack; Use as directed on package    Other orders  -     lisinopril (ZESTRIL) 5 mg tablet; Take 5 mg by mouth daily          Subjective:      Patient ID: Kings Mccall is a 46 y o  female  Presents today as a follow up due to right ear discomfort  Occurring for past few months  Facial swelling extending to temple right ear  Ringing in right ear  Feels pressure behind the sinus  This persists even after recent surgery  Surgery last August 2020 for deviated septum, Dr Heidi Reyes in [facility] [place]  History sinus infections  History allergies  Recurrent sore throat over past year  Treated with multiple round of antibiotics  Following Dr Jeff Fitzpatrick for history MS  Since last visit Earache began Monday morning  Treated by PCP with antibiotics  Constant nasal congestion  Constant sensation mucus in sinuses  Tightness face and neck on the right persists  Past year constant ringing in right ear  Also sensation something stuck in throat with post nasal drip  Treated with Singulair, nasal sprays  The following portions of the patient's history were reviewed and updated as appropriate: allergies, current medications, past family history, past medical history, past social history, past surgical history and problem list     Review of Systems   Constitutional: Negative      HENT: Positive for congestion, ear pain and postnasal drip  Negative for ear discharge, hearing loss, nosebleeds, rhinorrhea, sinus pressure, sinus pain, sore throat, tinnitus and voice change  Eyes: Negative  Respiratory: Negative for chest tightness and shortness of breath  Cardiovascular: Negative  Gastrointestinal: Negative  Endocrine: Negative  Musculoskeletal: Negative  Skin: Negative for color change  Neurological: Negative for dizziness, numbness and headaches  Psychiatric/Behavioral: Negative  Objective:      Temp 97 5 °F (36 4 °C) (Temporal)   Ht 5' 9" (1 753 m)   Wt 108 kg (237 lb)   BMI 35 00 kg/m²          Physical Exam  Constitutional:       Appearance: She is well-developed  HENT:      Head: Normocephalic  Right Ear: Hearing, tympanic membrane, ear canal and external ear normal  No decreased hearing noted  No drainage or tenderness  Tympanic membrane is not perforated or erythematous  Left Ear: Hearing, tympanic membrane, ear canal and external ear normal  No decreased hearing noted  No drainage or tenderness  Tympanic membrane is not perforated or erythematous  Nose: Nose normal  No nasal deformity or septal deviation  Mouth/Throat:      Mouth: Mucous membranes are not pale and not dry  No oral lesions  Dentition: Normal dentition  Pharynx: Uvula midline  No oropharyngeal exudate  Neck:      Trachea: No tracheal deviation  Cardiovascular:      Rate and Rhythm: Normal rate  Pulmonary:      Effort: Pulmonary effort is normal  No accessory muscle usage or respiratory distress  Musculoskeletal:      Right shoulder: Normal range of motion  Cervical back: Full passive range of motion without pain, normal range of motion and neck supple  Lymphadenopathy:      Cervical: No cervical adenopathy  Skin:     General: Skin is warm and dry  Neurological:      Mental Status: She is alert and oriented to person, place, and time        Cranial Nerves: No cranial nerve deficit  Sensory: No sensory deficit  Psychiatric:         Behavior: Behavior is cooperative

## 2022-03-16 NOTE — ASSESSMENT & PLAN NOTE
On exam crepitus    Discussed TMJ syndrome in detail  Reviewed options for treatment including soft diet, jaw rest, NSAIDs, warm compresses, massage, oral appliance, or consultation with oral surgeon  Pt agreed with recommendations and will follow up as needed

## 2022-03-28 NOTE — PROGRESS NOTES
Patient placed on hold due to BP issues, with patient reporting she will call to resume therapy once medical issues are resolved  Patient has not returned call by 3/28/22, and is now discharged from PT at this time  Recommend further PT as needed once medical issues are resolved      Deborah Dumont, PT, MS, NCS  ABPTS Neurologic Certified Specialist  [place] Licence #[id number]

## 2022-04-06 DIAGNOSIS — M21.41 PES PLANOVALGUS, ACQUIRED, RIGHT: Primary | ICD-10-CM

## 2022-04-19 ENCOUNTER — OFFICE VISIT (OUTPATIENT)
Dept: OBGYN CLINIC | Facility: CLINIC | Age: 52
End: 2022-04-19
Payer: COMMERCIAL

## 2022-04-19 ENCOUNTER — APPOINTMENT (OUTPATIENT)
Dept: RADIOLOGY | Facility: AMBULARY SURGERY CENTER | Age: 52
End: 2022-04-19
Attending: ORTHOPAEDIC SURGERY
Payer: COMMERCIAL

## 2022-04-19 VITALS
SYSTOLIC BLOOD PRESSURE: 136 MMHG | WEIGHT: 237 LBS | HEART RATE: 94 BPM | DIASTOLIC BLOOD PRESSURE: 82 MMHG | HEIGHT: 69 IN | BODY MASS INDEX: 35.1 KG/M2

## 2022-04-19 DIAGNOSIS — M76.821 POSTERIOR TIBIAL TENDON DYSFUNCTION, RIGHT: ICD-10-CM

## 2022-04-19 DIAGNOSIS — M21.41 PES PLANOVALGUS, ACQUIRED, RIGHT: Primary | ICD-10-CM

## 2022-04-19 DIAGNOSIS — M21.41 PES PLANOVALGUS, ACQUIRED, RIGHT: ICD-10-CM

## 2022-04-19 PROCEDURE — 73600 X-RAY EXAM OF ANKLE: CPT

## 2022-04-19 PROCEDURE — 73630 X-RAY EXAM OF FOOT: CPT

## 2022-04-19 PROCEDURE — 99213 OFFICE O/P EST LOW 20 MIN: CPT | Performed by: ORTHOPAEDIC SURGERY

## 2022-04-19 RX ORDER — CEFDINIR 300 MG/1
300 CAPSULE ORAL 2 TIMES DAILY
COMMUNITY
Start: 2022-04-15 | End: 2022-04-25

## 2022-04-19 NOTE — PROGRESS NOTES
GIGI Golden  Attending, Orthopaedic Surgery  [facility] [address] [facility]      ORTHOPAEDIC FOOT AND ANKLE CLINIC VISIT     Assessment:     Encounter Diagnoses   Name Primary?  Pes planovalgus, acquired, right Yes    Posterior tibial tendon dysfunction, right             Plan:   · The patient verbalized understanding of exam findings and treatment plan  We engaged in the shared decision-making process and treatment options were discussed at length with the patient  Surgical and conservative management discussed today along with risks and benefits  · Janelle Osorio is doing well 6 months s/p 5 in 1 flatfoot correction  · No restrictions at this time  · She is having pain with her orthotics, there was an attempt by Mayur Youssef to fix this  She can DC them now that she is 6 months out if they are hurting more than helping  · She described pain over her inframalleolar fat pad  Recommended giving this time and monitoring this for now  Return if symptoms worsen or fail to improve  History of Present Illness:   Chief Complaint:   Chief Complaint   Patient presents with    Right Ankle - Follow-up     Aditya Caldwell is a 46 y o  female who is being seen in follow-up about 6 months s/p right 5 in 1 flatfoot correction  When we last saw she we recommended activities as tolerated  Pain has improved  She had missed a period of time of PT due to high blood pressure but has this scheduled to start again next week       Pain/symptom timing:  Worse during the day when active  Pain/symptom context:  Worse with activites and work  Pain/symptom modifying factors:  Rest makes better, activities make worse  Pain/symptom associated signs/symptoms: none    Prior treatment   · NSAIDsNot Asked   · Injections No   · Bracing/Orthotics Yes    · Physical Therapy Yes     Orthopedic Surgical History:   See below    Past Medical, Surgical and Social History:  Past Medical History:  has a past medical history of Ankle arthritis, Cancer ([facility] ) (2007), Disease of thyroid gland, GERD (gastroesophageal reflux disease), History of transfusion, Hyperlipidemia, MS (congenital mitral stenosis), and Multiple sclerosis ([facility] )  Problem List: does not have any pertinent problems on file  Past Surgical History:  has a past surgical history that includes Wrist surgery (Left, 2020); Hysterectomy; pr revise median n/carpal tunnel surg (Right, 9/17/2021); Carpal tunnel release (Right); Colonoscopy; Sinus surgery; and pr osteotomy heel bone (Right, 10/20/2021)  Family History: family history includes No Known Problems in her mother  Social History:  reports that she quit smoking about 2 years ago  Her smoking use included cigarettes  She has a 22 50 pack-year smoking history  She has never used smokeless tobacco  She reports current alcohol use  She reports that she does not use drugs  Current Medications: has a current medication list which includes the following prescription(s): azelastine, cefdinir, cyclobenzaprine, estradiol, famotidine, fexofenadine, fluticasone, gabapentin, levothyroxine, lisinopril, methylprednisolone, montelukast, omeprazole, and rosuvastatin  Allergies: has No Known Allergies       Review of Systems:  General- denies fever/chills  HEENT- denies hearing loss or sore throat  Eyes- denies eye pain or visual disturbances, denies red eyes  Respiratory- denies cough or SOB  Cardio- denies chest pain or palpitations  GI- denies abdominal pain  Endocrine- denies urinary frequency  Urinary- denies pain with urination  Musculoskeletal- Negative except noted above  Skin- denies rashes or wounds  Neurological- denies dizziness or headache  Psychiatric- denies anxiety or difficulty concentrating    Physical Exam:   /82 (BP Location: Left arm, Patient Position: Sitting, Cuff Size: Adult)   Pulse 94   Ht 5' 9" (1 753 m)   Wt 108 kg (237 lb)   BMI 35 00 kg/m²   General/Constitutional: No apparent distress: well-nourished and well developed  Eyes: normal ocular motion  Lymphatic: No appreciable lymphadenopathy  Respiratory: Non-labored breathing  Vascular: No edema, swelling or tenderness, except as noted in detailed exam   Integumentary: No impressive skin lesions present, except as noted in detailed exam   Neuro: No ataxia or tremors noted  Psych: Normal mood and affect, oriented to person, place and time  Appropriate affect  Musculoskeletal: Normal, except as noted in detailed exam and in HPI  Examination    Right    Gait                Normal   Musculoskeletal No TTP    Skin Normal  Well-healed incisions  Nails Normal    Range of Motion  20 degrees dorsiflexion, 30 degrees plantarflexion  Subtalar motion: normal    Stability Stable    Muscle Strength 5/5 tibialis anterior  5/5 gastrocnemius-soleus  5/5 posterior tibialis  5/5 peroneal/eversion strength  5/5 EHL  5/5 FHL    Neurologic Normal    Sensation Intact to light touch throughout sural, saphenous, superficial peroneal, deep peroneal and medial/lateral plantar nerve distributions  [place] 5 07 filament (10g) testing deferred  Cardiovascular Brisk capillary refill < 2 seconds,intact DP and PT pulses    Special Tests None      Imaging Studies:     5 views of the Right foot and ankle were obtained, reviewed and interpreted independently which demonstrate stable and well healed post-operative changes  Reviewed by me personally  Octaviano Cyphers Lachman, MD  Foot & Ankle Surgery   Department of [address] [place] [address]      I personally performed the service  Octaviano Cyphers Lachman, MD      Scribe Attestation    I,:  Alma Fountain am acting as a scribe while in the presence of the attending physician :       I,:  Mike Looney MD personally performed the services described in this documentation    as scribed in my presence :

## 2022-04-25 ENCOUNTER — OFFICE VISIT (OUTPATIENT)
Dept: OTOLARYNGOLOGY | Facility: CLINIC | Age: 52
End: 2022-04-25
Payer: COMMERCIAL

## 2022-04-25 VITALS — WEIGHT: 243.5 LBS | BODY MASS INDEX: 36.07 KG/M2 | HEIGHT: 69 IN | TEMPERATURE: 97.6 F

## 2022-04-25 DIAGNOSIS — R07.0 THROAT PAIN: ICD-10-CM

## 2022-04-25 DIAGNOSIS — H93.11 RIGHT-SIDED TINNITUS: Primary | ICD-10-CM

## 2022-04-25 DIAGNOSIS — H92.01 OTALGIA, RIGHT: ICD-10-CM

## 2022-04-25 DIAGNOSIS — J31.0 RHINITIS, CHRONIC: ICD-10-CM

## 2022-04-25 DIAGNOSIS — R09.82 POST-NASAL DRIP: ICD-10-CM

## 2022-04-25 DIAGNOSIS — M26.609 TMJ (TEMPOROMANDIBULAR JOINT SYNDROME): ICD-10-CM

## 2022-04-25 DIAGNOSIS — K21.9 LARYNGOPHARYNGEAL REFLUX (LPR): ICD-10-CM

## 2022-04-25 PROCEDURE — 31575 DIAGNOSTIC LARYNGOSCOPY: CPT | Performed by: STUDENT IN AN ORGANIZED HEALTH CARE EDUCATION/TRAINING PROGRAM

## 2022-04-25 PROCEDURE — 99214 OFFICE O/P EST MOD 30 MIN: CPT | Performed by: STUDENT IN AN ORGANIZED HEALTH CARE EDUCATION/TRAINING PROGRAM

## 2022-04-25 RX ORDER — BENZONATATE 100 MG/1
100 CAPSULE ORAL
COMMUNITY
Start: 2022-04-20 | End: 2022-04-27
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
## 2022-04-27 ENCOUNTER — EVALUATION (OUTPATIENT)
Dept: PHYSICAL THERAPY | Facility: CLINIC | Age: 52
End: 2022-04-27
Payer: COMMERCIAL

## 2022-04-27 DIAGNOSIS — S86.111S: Primary | ICD-10-CM

## 2022-04-27 DIAGNOSIS — Z98.890 STATUS POST OSTEOTOMY: ICD-10-CM

## 2022-04-27 DIAGNOSIS — M21.41 PES PLANOVALGUS, ACQUIRED, RIGHT: ICD-10-CM

## 2022-04-27 PROCEDURE — 97164 PT RE-EVAL EST PLAN CARE: CPT

## 2022-04-27 PROCEDURE — 97110 THERAPEUTIC EXERCISES: CPT

## 2022-04-27 NOTE — PROGRESS NOTES
PT Evaluation     Today's date: 2022  Patient name: Sosa Wheat  : 1970  MRN: [id number]  Referring provider: Arnold Palomino PA-C  Dx:   Encounter Diagnosis     ICD-10-CM    1  Traumatic rupture of posterior tibial tendon, right, sequela  S86 111S    2  Pes planovalgus, acquired, right  M21 41 Ambulatory referral to Physical Therapy   3  Status post osteotomy  [id number]        Start Time: 0850  Stop Time: 0935  Total time in clinic (min): 45 minutes    Assessment  Assessment details: Sosa Wheat is a 46 y o  female who presents with pain, decreased strength, decreased ROM, decreased joint mobility, ambulatory dysfunction and balance dysfunction  She returns to PT after a 2 month absence due to other medical issues, including a need for BP management and then delayed return due to recent bout of bronchitis  Due to the above impairments, patient has difficulty performing ADL's, recreational activities, ambulation, stair negotiation, and transfers  Patient's clinical presentation is consistent with their referring diagnosis of Traumatic rupture of posterior tibial tendon, right, sequela (s/p surgical repair/reconstruction), and  pes planovalgus right  Patient has been educated in weight bearing status, home exercise program and plan of care  Patient would benefit from skilled physical therapy services to address their aforementioned functional limitations and progress towards prior level of function and independence with home exercise program      Impairments: abnormal gait, abnormal or restricted ROM, impaired balance, impaired physical strength, pain with function and poor body mechanics  Understanding of Dx/Px/POC: good   Prognosis: good    Goals  Short Term Goals 4 WEEKS Target Date (22)  1  Independent and consistent with updated HEP  2  Improve AROM R ankle DF to 10 degrees AROM to assist with performing reciprocal stairs  3  Improve push-off of R ankle at self-selected speeds    4  Improve R ankle PF strength to complete 20 reps of heel raises at 60# unilaterally  Long Term Goals 8 WEEKS: Target Date (6/22/22)  1  Improve R ankle strength to 5/5    2  Patient able to complete reciprocal stairs w/o handrail while carrying laundry basket without difficulty or imbalance for safe return to household tasks  3  SLS x 20" or more on R LE   4  Independent and able to complete quick turns, increasing gait speed, and increase mobility for return to prior level of function  5  Independent with comprehensive HEP for continuation of stabilization and balance training  Plan  Plan details:     Patient would benefit from: PT eval and skilled physical therapy  Planned modality interventions: cryotherapy, thermotherapy: hydrocollator packs and unattended electrical stimulation  Planned therapy interventions: manual therapy, neuromuscular re-education, therapeutic activities, therapeutic exercise, home exercise program, patient education, functional ROM exercises, strengthening, stretching, balance and gait training  Other planned therapy interventions: transfer training for floor to stand  Frequency: 2x week  Duration in weeks: 8  Plan of Care beginning date: 4/27/2022  Plan of Care expiration date: 6/22/2022  Treatment plan discussed with: patient        Subjective Evaluation    History of Present Illness  Mechanism of injury: Patient sustained a fall last year with L wrist fracture and onset of R ankle pain at that time  Patient underwent skilled PT wtihout resolution of the problem at this time  Patient was finally identified to have a posterior tibial tendon rupture, with pt now s/p surgical repair of posterior tibial tendon rupture with osteotomies and flat foot repair performed 10/20/21 by Dr Melody Mohamud   Patient was progressing well with physical therapy with progression to full weight bearing and gradually improving strength and ROM when she began to have issues with her blood pressure, requiring her to take a break from physical therapy at her [de-identified] instruction  Patient now cleared to return to PT, with continued deficits in gait, balance, push-off and R ankle strength reported  Not a recurrent problem   Quality of life: good    Pain  Current pain ratin  At best pain ratin  At worst pain rating: 3  Location: R ankle  Quality: dull ache  Relieving factors: rest  Aggravating factors: stair climbing, running, standing and walking  Progression: improved    Social Support  Steps to enter house: yes  Stairs in house: yes   Lives in: multiple-level home  Lives with: spouse    Employment status: not working  Exercise history: prior to fall in , pt was doing yoga consistently, walking regularly          Objective  ROM:   L   R     22  Ankle dorsiflexion 5/12   0/10  Ankle Inversion  50   40  Ankle Eversion  17   5/15     MMT:    L   R     22  Ankle dorsiflexion 5/5   4+/5  Ankle inversion  5/5   4+/5  Ankle eversion  5/5   4+/5  Ankle PF  10 uni heel raises Unable to perform std uni heel raise        Completed 30# uni heel raise w/ full ROM        On leg press     Gait/stairs:   Ambulating without AD or bracing, with patient noted to have gait deviations including decreased R push-off, difficulty with increasing speed, and quick turns  Patient also noted to have poor eccentric control with descending stairs, increased effort required to ascend stairs, and     Transfers:   Difficulty arising from the floor, with decreased ability to shift weight between legs  No impairments with     Balance:   Tandem Stance   L lead: 26 72 sec mod-max sway, R lead: >60 sec min sway  Decreased ankle responsiveness noted, without reactive balance responses demonstrated to maintain balance in R foot & ankle  Single leg stance on firm L: 20 97 sec, R: 3 03 sec with poor ankle reactions noted           Precautions: HTN  Daily Exercise Log:    Date 22        Visit # 1 Re-eval See above- 30'                          Manual                                             Neuro Re-Ed         SLS         Tandem stance         REO/EC on foam         High march         Tandem walk         Sidestep on foam         A/P on WB                                    Ther Ex 8'        Leg press         Calf press 70# B/L 2x10, 30# uni R 2x10                 Ankle PF Blue TB 2x20        HEP Issued & reviewed                                   Ther Activity         Step-up and over                  Gait Training         TM gait training w/ emphasis on push-off                  Modalities                                 HEP:  Access Code: [email or web address]  URL: [email or web address]  Date: 04/27/2022  Prepared by: Zamzam Rawls    Exercises  · Ankle and Toe Plantarflexion with Resistance - 1 x daily - 7 x weekly - 3 sets - 10 reps - 5 second hold  · Seated Ankle Alphabet - 2 x daily - 7 x weekly  · Single Leg Stance - 1 x daily - 7 x weekly - 3 sets - 10 reps - 10 second hold  · Tandem Stance - 1 x daily - 7 x weekly - 1 sets - 10 reps - 20 second hold  · Long Sitting Calf Stretch with Strap - 1 x daily - 7 x weekly - 1 sets - 5 reps - 20-30 secon hold

## 2022-05-03 ENCOUNTER — APPOINTMENT (OUTPATIENT)
Dept: PHYSICAL THERAPY | Facility: CLINIC | Age: 52
End: 2022-05-03
Payer: COMMERCIAL

## 2022-05-03 ENCOUNTER — TELEPHONE (OUTPATIENT)
Dept: PHYSICAL THERAPY | Facility: CLINIC | Age: 52
End: 2022-05-03

## 2022-05-03 NOTE — TELEPHONE ENCOUNTER
Patient called today and spoke with Amelia Carr to cancel today's appointment due to needing to take grandson to the doctor  Able to reschedule appointment until tomorrow evening  Continue therapy as appropriate        Willian Camarillo PT, MS, NCS  [facility] Neurologic Certified Specialist  [place] Licence #[id number]

## 2022-05-04 ENCOUNTER — APPOINTMENT (OUTPATIENT)
Dept: PHYSICAL THERAPY | Facility: CLINIC | Age: 52
End: 2022-05-04
Payer: COMMERCIAL

## 2022-05-05 ENCOUNTER — TELEPHONE (OUTPATIENT)
Dept: PHYSICAL THERAPY | Facility: CLINIC | Age: 52
End: 2022-05-05

## 2022-05-05 ENCOUNTER — OFFICE VISIT (OUTPATIENT)
Dept: PHYSICAL THERAPY | Facility: CLINIC | Age: 52
End: 2022-05-05
Payer: COMMERCIAL

## 2022-05-05 DIAGNOSIS — S86.111S: Primary | ICD-10-CM

## 2022-05-05 DIAGNOSIS — M21.41 PES PLANOVALGUS, ACQUIRED, RIGHT: ICD-10-CM

## 2022-05-05 DIAGNOSIS — Z98.890 STATUS POST OSTEOTOMY: ICD-10-CM

## 2022-05-05 PROCEDURE — 97110 THERAPEUTIC EXERCISES: CPT

## 2022-05-05 PROCEDURE — 97112 NEUROMUSCULAR REEDUCATION: CPT

## 2022-05-05 NOTE — PROGRESS NOTES
Daily Note     Today's date: 2022  Patient name: Tran Mack  : 1970  MRN: [id number]  Referring provider: Slim Alexandre PA-C  Dx:   Encounter Diagnosis     ICD-10-CM    1  Traumatic rupture of posterior tibial tendon, right, sequela  S86 111S    2  Pes planovalgus, acquired, right  M21 41                   Subjective: pt reports she cont to have difficulty with push off and change of direction  She has been trying to be more aware of her gait (heel strike to push off) and she has been feeling pretty good with this with no added soreness  Objective: See treatment diary below      Assessment: Tolerated treatment well  Pt fatigued post session, challenged with SL strengthening and balance/stability  Pt dem ability to amb with proper gait on T mill indep  Cont to progress as tolerated to improve ROM/strength for improved functional mobility  Patient would benefit from continued PT      Plan: Continue per plan of care  Precautions: HTN  Daily Exercise Log:    Date 22       Visit # 1 2        Re-eval See above- 30'                          Manual                                             Neuro Re-Ed  30'        SLS  2-5"x10 R        Tandem stance  20"x2 R/L        REO/EC on foam  1'x2        High march  15'x3        Tandem walk  15'x3        Sidestep on foam         A/P on WB  20x                                   Ther Ex 8' 10'        Leg press         Calf press 70# B/L 2x10, 30# uni R 2x10 Uni 30# 2x10   B/L 70# 2x10                 Ankle PF Blue TB 2x20        HEP Issued & reviewed                                   Ther Activity         Step-up and over  6" 2x10 R                 Gait Training         TM gait training w/ emphasis on push-off  1 5mphx5'                 Modalities                                 HEP:  Access Code: [email or web address]  URL: [email or web address]  Date: 2022  Prepared by: Corey Martin    Exercises  · Ankle and Toe Plantarflexion with Resistance - 1 x daily - 7 x weekly - 3 sets - 10 reps - 5 second hold  · Seated Ankle Alphabet - 2 x daily - 7 x weekly  · Single Leg Stance - 1 x daily - 7 x weekly - 3 sets - 10 reps - 10 second hold  · Tandem Stance - 1 x daily - 7 x weekly - 1 sets - 10 reps - 20 second hold  · Long Sitting Calf Stretch with Strap - 1 x daily - 7 x weekly - 1 sets - 5 reps - 20-30 secon hold

## 2022-05-06 ENCOUNTER — OFFICE VISIT (OUTPATIENT)
Dept: OBGYN CLINIC | Facility: CLINIC | Age: 52
End: 2022-05-06
Payer: COMMERCIAL

## 2022-05-06 VITALS
HEIGHT: 69 IN | DIASTOLIC BLOOD PRESSURE: 84 MMHG | BODY MASS INDEX: 35.7 KG/M2 | RESPIRATION RATE: 17 BRPM | WEIGHT: 241 LBS | SYSTOLIC BLOOD PRESSURE: 136 MMHG | HEART RATE: 77 BPM

## 2022-05-06 DIAGNOSIS — M18.0 ARTHRITIS OF CARPOMETACARPAL (CMC) JOINT OF BOTH THUMBS: Primary | ICD-10-CM

## 2022-05-06 DIAGNOSIS — G62.9 NEUROPATHY: ICD-10-CM

## 2022-05-06 PROCEDURE — 20600 DRAIN/INJ JOINT/BURSA W/O US: CPT | Performed by: ORTHOPAEDIC SURGERY

## 2022-05-06 PROCEDURE — 99213 OFFICE O/P EST LOW 20 MIN: CPT | Performed by: ORTHOPAEDIC SURGERY

## 2022-05-06 RX ORDER — LIDOCAINE HYDROCHLORIDE 10 MG/ML
0.5 INJECTION, SOLUTION INFILTRATION; PERINEURAL
Status: COMPLETED | OUTPATIENT
Start: 2022-05-06 | End: 2022-05-06

## 2022-05-06 RX ORDER — GABAPENTIN 100 MG/1
CAPSULE ORAL
Qty: 60 CAPSULE | Refills: 0 | Status: SHIPPED | OUTPATIENT
Start: 2022-05-06

## 2022-05-06 RX ORDER — TRIAMCINOLONE ACETONIDE 40 MG/ML
20 INJECTION, SUSPENSION INTRA-ARTICULAR; INTRAMUSCULAR
Status: COMPLETED | OUTPATIENT
Start: 2022-05-06 | End: 2022-05-06

## 2022-05-06 RX ADMIN — TRIAMCINOLONE ACETONIDE 20 MG: 40 INJECTION, SUSPENSION INTRA-ARTICULAR; INTRAMUSCULAR at 11:11

## 2022-05-06 RX ADMIN — LIDOCAINE HYDROCHLORIDE 0.5 ML: 10 INJECTION, SOLUTION INFILTRATION; PERINEURAL at 11:11

## 2022-05-06 NOTE — PROGRESS NOTES
Assessment/Plan:  1  Arthritis of carpometacarpal (CMC) joint of both thumbs  Small joint arthrocentesis: bilateral thumb CMC    Thumb Cude comf/Cool       Scribe Attestation    I,:  Ana Rosa Goodman MA am acting as a scribe while in the presence of the attending physician :       I,:  Keiko Mead DO personally performed the services described in this documentation    as scribed in my presence  :             69-year-old female with bilateral thumb CMC arthritis  Patient did see good relief from previous injections  We did discuss repeat injections in the office today  Patient was agreeable to this  She consented and underwent bilateral thumb CMC injections in the office today without any complications  Patient may have lost her one comfort cool brace  She will look at home and if she cannot find the brace she can return to the office for a new brace  She is aware she may have to pay for the new brace  A order was placed in her chart for this today  She may follow up with me as needed  Subjective:   Rukhsana Saravia is a 46 y o  female who presents to the office today for follow up evaluation bilateral thumb CMC arthritis  Patient underwent steroid injections at her last visit on 12/23/21 which she states did provide her with good relief  She notes pain to the base of both of her thumbs  She states her right is worse than her left  she does note some relief with the comfort cool braces however, thinks she may have lost her right one  Review of Systems   Constitutional: Negative for chills and fever  HENT: Negative for drooling and sneezing  Eyes: Negative for redness  Respiratory: Negative for cough and wheezing  Gastrointestinal: Negative for nausea and vomiting  Musculoskeletal: Negative for arthralgias, joint swelling and myalgias  Neurological: Negative for weakness and numbness  Psychiatric/Behavioral: Negative for behavioral problems  The patient is not nervous/anxious            Past Medical History:   Diagnosis Date    Ankle arthritis     right is having surgery soon for this    Cancer [facility]) 2007    cervical    Disease of thyroid gland     hypo    GERD (gastroesophageal reflux disease)     History of transfusion     age 3    Hyperlipidemia     MS (congenital mitral stenosis)     patient has multiple sclerosis     Multiple sclerosis (HCC)        Past Surgical History:   Procedure Laterality Date    CARPAL TUNNEL RELEASE Right     COLONOSCOPY      HYSTERECTOMY      NM OSTEOTOMY HEEL BONE Right 10/20/2021    Procedure: OSTEOTOMY CALCANEUS, Medial slide calcaneal osteotomy, FDL tendon transfer to navicular, Cotton osteotomy, Achilles lengthening, Wilson osteotomy;  Surgeon: Bebeto Delgado MD;  Location: [facility] MA[facility] OR;  Service: Orthopedics    NM REVISE MEDIAN N/CARPAL TUNNEL SURG Right 2021    Procedure: RELEASE CARPAL TUNNEL;  Surgeon: Aditya Corona DO;  Location: [facility] MAIN OR;  Service: Orthopedics    SINUS SURGERY      WRIST SURGERY Left        Family History   Problem Relation Age of Onset    No Known Problems Mother        Social History     Occupational History    Not on file   Tobacco Use    Smoking status: Former Smoker     Packs/day: 0 50     Years: 45 00     Pack years: 22 50     Types: Cigarettes     Quit date: 4/10/2020     Years since quittin 0    Smokeless tobacco: Never Used   Vaping Use    Vaping Use: Never used   Substance and Sexual Activity    Alcohol use: Yes     Comment: 1-2 drinks once per week    Drug use: Never    Sexual activity: Yes         Current Outpatient Medications:     azelastine (ASTELIN) 0 1 % nasal spray, 2 sprays into each nostril daily, Disp: , Rfl:     cyclobenzaprine (FLEXERIL) 10 mg tablet, Take 10 mg by mouth daily at bedtime , Disp: , Rfl:     estradiol (ESTRACE) 1 mg tablet, Take 2 mg by mouth daily at bedtime  , Disp: , Rfl:     famotidine (PEPCID) 10 mg tablet, Take 10 mg by mouth daily at bedtime, Disp: , Rfl:   fexofenadine (ALLEGRA) 60 MG tablet, Take 60 mg by mouth daily at bedtime , Disp: , Rfl:     fluticasone (FLONASE) 50 mcg/act nasal spray, 50 mcg into each nostril daily, Disp: , Rfl:     gabapentin (NEURONTIN) 100 mg capsule, TAKE 1 CAPSULE (100 MG TOTAL) BY MOUTH TWO (TWO) TIMES A DAY, Disp: 60 capsule, Rfl: 1    levothyroxine 50 mcg tablet, Take 50 mcg by mouth daily, Disp: , Rfl:     lisinopril (ZESTRIL) 5 mg tablet, Take 5 mg by mouth daily, Disp: , Rfl:     montelukast (SINGULAIR) 10 mg tablet, Take 10 mg by mouth daily at bedtime , Disp: , Rfl:     omeprazole (PriLOSEC) 40 MG capsule, TAKE 1 CAPSULE (40 MG) BY MOUTH DAILY BEFORE BREAKFAST, Disp: , Rfl:     rosuvastatin (CRESTOR) 10 MG tablet, Take 10 mg by mouth daily at bedtime , Disp: , Rfl:     No Known Allergies    Objective:  Vitals:    05/06/22 1000   BP: 136/84   Pulse: 77   Resp: 17       Ortho Exam     Left thumb    TTP thumb CMC  + grind   Compartments soft  Brisk capillary refill  S/m intact median, radial, and ulnar nerve     Right thumb    TTP thumb CMC  + grind   Compartments soft  Brisk capillary refill  S/m intact median, radial, and ulnar nerve     Physical Exam  Constitutional:       Appearance: She is well-developed  HENT:      Head: Normocephalic and atraumatic  Eyes:      General:         Right eye: No discharge  Left eye: No discharge  Conjunctiva/sclera: Conjunctivae normal    Cardiovascular:      Rate and Rhythm: Normal rate  Pulmonary:      Effort: Pulmonary effort is normal  No respiratory distress  Musculoskeletal:      Cervical back: Normal range of motion and neck supple  Comments: As noted in HPI   Skin:     General: Skin is warm and dry  Neurological:      Mental Status: She is alert and oriented to person, place, and time  Psychiatric:         Behavior: Behavior normal          Thought Content:  Thought content normal          Judgment: Judgment normal      Small joint arthrocentesis: bilateral thumb CMC  Universal Protocol:  Consent: Verbal consent obtained    Consent given by: patient  Patient identity confirmed: verbally with patient    Supporting Documentation  Indications: pain   Procedure Details  Location: thumb - bilateral thumb CMC  Preparation: Patient was prepped and draped in the usual sterile fashion  Needle size: 27 G  Ultrasound guidance: no    Medications (Right): 0 5 mL lidocaine 1 %; 20 mg triamcinolone acetonide 40 mg/mLMedications (Left): 0 5 mL lidocaine 1 %; 20 mg triamcinolone acetonide 40 mg/mL   Patient tolerance: patient tolerated the procedure well with no immediate complications  Dressing:  Sterile dressing applied

## 2022-05-09 ENCOUNTER — OFFICE VISIT (OUTPATIENT)
Dept: PHYSICAL THERAPY | Facility: CLINIC | Age: 52
End: 2022-05-09
Payer: COMMERCIAL

## 2022-05-09 DIAGNOSIS — M21.41 PES PLANOVALGUS, ACQUIRED, RIGHT: ICD-10-CM

## 2022-05-09 DIAGNOSIS — Z98.890 STATUS POST OSTEOTOMY: ICD-10-CM

## 2022-05-09 DIAGNOSIS — S86.111S: Primary | ICD-10-CM

## 2022-05-09 PROCEDURE — 97110 THERAPEUTIC EXERCISES: CPT

## 2022-05-09 PROCEDURE — 97112 NEUROMUSCULAR REEDUCATION: CPT

## 2022-05-09 NOTE — PROGRESS NOTES
Daily Note     Today's date: 2022  Patient name: Noris Gates  : 1970  MRN: [id number]  Referring provider: Maribeth York PA-C  Dx:   Encounter Diagnosis     ICD-10-CM    1  Traumatic rupture of posterior tibial tendon, right, sequela  S86 111S    2  Pes planovalgus, acquired, right  M21 41    3  Status post osteotomy  Z98 890                   Subjective: pt reports she felt pretty good after last visit and did not have any notable increases in discomfort  Reports she has cont to be more aware of her foot during walking to trying go from heel to toe  Objective: See treatment diary below      Assessment: Tolerated treatment well  Pt fatigued with LE strengthening  Challenged with ankle proprioceptive exercises, dem improved stance on R LE vs last visit  Patient would benefit from continued PT      Plan: Continue per plan of care        Precautions: HTN  Daily Exercise Log:    Date 22      Visit # 1 2  3       Re-eval See above- 30'                          Manual                                             Neuro Re-Ed  30'  20'       SLS  2-5"x10 R        Tandem stance  20"x2 R/L        REO/EC on foam  1'x2        High march  15'x3  25'x2      Tandem walk  15'x3  25'x2       Sidestep on foam         A/P on WB  20x  20x                                  Ther Ex 8' 10'  25'       Leg press   B/L 60# 2x10  Uni       Calf press 70# B/L 2x10, 30# uni R 2x10 Uni 30# 2x10   B/L 70# 2x10  SL HR 30# 2x10       B/L HR w/ SL eccentric lowering    2x10 R       Ankle PF Blue TB 2x20  PF 2x10        Ankle inv/ever w/ TB    RTB 2x10 ea       HEP Issued & reviewed                                   Ther Activity         Step-up and over  6" 2x10 R  6" 2x10 R                 Gait Training         TM gait training w/ emphasis on push-off  1 5mphx5'  1 7mphx5'                 Modalities                                 HEP:  Access Code: [email or web address]  URL: [email or web address]  Date: 04/27/2022  Prepared by: Aixa Valdivia    Exercises  · Ankle and Toe Plantarflexion with Resistance - 1 x daily - 7 x weekly - 3 sets - 10 reps - 5 second hold  · Seated Ankle Alphabet - 2 x daily - 7 x weekly  · Single Leg Stance - 1 x daily - 7 x weekly - 3 sets - 10 reps - 10 second hold  · Tandem Stance - 1 x daily - 7 x weekly - 1 sets - 10 reps - 20 second hold  · Long Sitting Calf Stretch with Strap - 1 x daily - 7 x weekly - 1 sets - 5 reps - 20-30 secon hold

## 2022-05-12 ENCOUNTER — OFFICE VISIT (OUTPATIENT)
Dept: PHYSICAL THERAPY | Facility: CLINIC | Age: 52
End: 2022-05-12
Payer: COMMERCIAL

## 2022-05-12 DIAGNOSIS — S86.111S: Primary | ICD-10-CM

## 2022-05-12 DIAGNOSIS — Z98.890 STATUS POST OSTEOTOMY: ICD-10-CM

## 2022-05-12 DIAGNOSIS — M21.41 PES PLANOVALGUS, ACQUIRED, RIGHT: ICD-10-CM

## 2022-05-12 PROCEDURE — 97110 THERAPEUTIC EXERCISES: CPT

## 2022-05-12 NOTE — PROGRESS NOTES
Daily Note     Today's date: 2022  Patient name: Bette Mandel  : 1970  MRN: [id number]  Referring provider: Patricio Becerril PA-C  Dx:   Encounter Diagnosis     ICD-10-CM    1  Traumatic rupture of posterior tibial tendon, right, sequela  S86 111S    2  Status post osteotomy  Z98 890    3  Pes planovalgus, acquired, right  M21 41                   Subjective: Patient reports gradual improvements in mobility since returning to PT  Objective: See treatment diary below      Assessment: Tolerated treatment well and without increased pain  Noted to have improved mobility with gait following training in increasing speed and quick turns    Patient demonstrated fatigue post treatment, exhibited good technique with therapeutic exercises and would benefit from continued PT      Plan: Continue per plan of care  Progress treatment as tolerated         Precautions: HTN  Daily Exercise Log:    Date 22     Visit # 1 2  3  4     Re-eval See above- 30'                          Manual                                             Neuro Re-Ed  27'  20'  25'     SLS  2-5"x10 R        Tandem stance  20"x2 R/L   Staggered stance on blue therapads, ball pass overhead visual tracking     REO/EC on foam  1'x2        High march  15'x3  25'x2 25x     Tandem walk  15'x3  25'x2  20'x4     Sidestep on foam         A/P on WB  20x  20x  30x     Walk on toes    25'x4     Walk on heels    20'x4     Walk w/ varied speeds, retro walk, quick turns    LS-8'                                                  Ther Ex 8' 10'  25'  8'     Leg press   B/L 60# 2x10  Uni       Calf press 70# B/L 2x10, 30# uni R 2x10 Uni 30# 2x10   B/L 70# 2x10  SL HR 30# 2x10       B/L HR w/ SL eccentric lowering    2x10 R  2x10 R     Ankle PF Blue TB 2x20  PF 2x10        Ankle inv/ever w/ TB    RTB 2x10 ea       HEP Issued & reviewed                                   Ther Activity         Step-up and over  6" 2x10 R  6" 2x10 R Gait Training         TM gait training w/ emphasis on push-off  1 5mphx5'  1 7mphx5'   2 0-2 2 mph x5'              Modalities                                 HEP:  Access Code: [email or web address]  URL: [email or web address]  Date: 04/27/2022  Prepared by: Elia Hood    Exercises  · Ankle and Toe Plantarflexion with Resistance - 1 x daily - 7 x weekly - 3 sets - 10 reps - 5 second hold  · Seated Ankle Alphabet - 2 x daily - 7 x weekly  · Single Leg Stance - 1 x daily - 7 x weekly - 3 sets - 10 reps - 10 second hold  · Tandem Stance - 1 x daily - 7 x weekly - 1 sets - 10 reps - 20 second hold  · Long Sitting Calf Stretch with Strap - 1 x daily - 7 x weekly - 1 sets - 5 reps - 20-30 secon hold

## 2022-05-17 ENCOUNTER — OFFICE VISIT (OUTPATIENT)
Dept: PHYSICAL THERAPY | Facility: CLINIC | Age: 52
End: 2022-05-17
Payer: COMMERCIAL

## 2022-05-17 DIAGNOSIS — M21.41 PES PLANOVALGUS, ACQUIRED, RIGHT: ICD-10-CM

## 2022-05-17 DIAGNOSIS — S86.111S: Primary | ICD-10-CM

## 2022-05-17 DIAGNOSIS — Z98.890 STATUS POST OSTEOTOMY: ICD-10-CM

## 2022-05-17 PROCEDURE — 97110 THERAPEUTIC EXERCISES: CPT

## 2022-05-17 PROCEDURE — 97112 NEUROMUSCULAR REEDUCATION: CPT

## 2022-05-17 NOTE — PROGRESS NOTES
Daily Note     Today's date: 2022  Patient name: Tran Mack  : 1970  MRN: [id number]  Referring provider: Slim Alexandre PA-C  Dx:   Encounter Diagnosis     ICD-10-CM    1  Traumatic rupture of posterior tibial tendon, right, sequela  S86 111S    2  Status post osteotomy  Z98 890    3  Pes planovalgus, acquired, right  M21 41                   Subjective: pt reports at the end of the night as she is getting ready for bed her foot feels stiff  She has intermittent soreness but overall thinks it is feeling okay   Pt arrived 10 min late to session, was accommodated for       Objective: See treatment diary below      Assessment: Tolerated treatment well  Pt challenged with balance progressions  Cont with ankle/LE strengthening as tolerated  Fatigued post session  Patient would benefit from continued PT      Plan: Continue per plan of care        Precautions: HTN  Daily Exercise Log:    Date 22    Visit # 1 2  3  4 5 FOTO     Re-eval See above- 30'                          Manual                                             Neuro Re-Ed  27'  20'  25' 10'    SLS  2-5"x10 R        Tandem stance  20"x2 R/L   Staggered stance on blue therapads, ball pass overhead visual tracking     REO/EC on foam  1'x2        High march  15'x3  25'x2 25x     Tandem walk  15'x3  25'x2  20'x4 W/ cone taps; 8 cones  2 laps     Sidestep on foam         A/P on WB  20x  20x  30x     Walk on toes    25'x4     Walk on heels    20'x4     Walk w/ varied speeds, retro walk, quick turns    LS-8'     Stand gastroc stretch on slant board      20"x3                                         Ther Ex 8' 10'  25'  8' 30'     Leg press   B/L 60# 2x10  Uni   B/L 60# 2x10  Uni  30# 2x10     Calf press 70# B/L 2x10, 30# uni R 2x10 Uni 30# 2x10   B/L 70# 2x10  SL HR 30# 2x10   SL HR 30# 2x10      B/L HR w/ SL eccentric lowering    2x10 R  2x10 R  2x10 R    Ankle PF Blue TB 2x20  PF 2x10        Ankle inv/ever w/ TB    RTB 2x10 ea   RTB 2x10 ea     HEP Issued & reviewed        Bridges w/ alt LE kick      2x10 R/L                       Ther Activity         Step-up and over  6" 2x10 R  6" 2x10 R    6" 2x10 R             Gait Training         TM gait training w/ emphasis on push-off  1 5mphx5'  1 7mphx5'   2 0-2 2 mph x5' 2 0-2 2 mph x5'              Modalities                                 HEP:  Access Code: [email or web address]  URL: [email or web address]  Date: 04/27/2022  Prepared by: Cole Hatfield    Exercises  · Ankle and Toe Plantarflexion with Resistance - 1 x daily - 7 x weekly - 3 sets - 10 reps - 5 second hold  · Seated Ankle Alphabet - 2 x daily - 7 x weekly  · Single Leg Stance - 1 x daily - 7 x weekly - 3 sets - 10 reps - 10 second hold  · Tandem Stance - 1 x daily - 7 x weekly - 1 sets - 10 reps - 20 second hold  · Long Sitting Calf Stretch with Strap - 1 x daily - 7 x weekly - 1 sets - 5 reps - 20-30 secon hold

## 2022-05-19 ENCOUNTER — OFFICE VISIT (OUTPATIENT)
Dept: PHYSICAL THERAPY | Facility: CLINIC | Age: 52
End: 2022-05-19
Payer: COMMERCIAL

## 2022-05-19 DIAGNOSIS — S86.111S: Primary | ICD-10-CM

## 2022-05-19 DIAGNOSIS — M21.41 PES PLANOVALGUS, ACQUIRED, RIGHT: ICD-10-CM

## 2022-05-19 DIAGNOSIS — Z98.890 STATUS POST OSTEOTOMY: ICD-10-CM

## 2022-05-19 PROCEDURE — 97110 THERAPEUTIC EXERCISES: CPT

## 2022-05-19 PROCEDURE — 97112 NEUROMUSCULAR REEDUCATION: CPT

## 2022-05-19 NOTE — PROGRESS NOTES
Daily Note     Today's date: 2022  Patient name: Aditya Caldwell  : 1970  MRN: [id number]  Referring provider: Jamil Ojeda PA-C  Dx:   Encounter Diagnosis     ICD-10-CM    1  Traumatic rupture of posterior tibial tendon, right, sequela  S86 111S    2  Status post osteotomy  Z98 890    3  Pes planovalgus, acquired, right  M21 41                   Subjective: pt reports she cont to struggle with pushing off her foot if she needs to move quickly and carrying the laundry basket up and down the steps  Other than those two things she thinks she is doing her usual activities with minor/no difficulties  She cont with intermittent "twinges" of discomfort  Objective: See treatment diary below      Assessment: Tolerated treatment well  Pt able to perform SL HR today on flat ground  Pt challenged with balance progressions on foam BB  Cont with LE and foot/ankle strengthening as tolerated  Patient would benefit from continued PT      Plan: Continue per plan of care        Precautions: HTN  Daily Exercise Log:    Date 22   Visit # 1 2  3  4 5 FOTO  6   Re-eval See above- 30'                          Manual                                             Neuro Re-Ed  30'  20'  25' 10' 25'    SLS  2-5"x10 R        Fwd amb on foam BB       3 laps    Tandem stance  20"x2 R/L   Staggered stance on blue therapads, ball pass overhead visual tracking     REO/EC on foam  1'x2        High march  15'x3  25'x2 25x     Tandem walk  15'x3  25'x2  20'x4 W/ cone taps; 8 cones  2 laps  On foam BB 3 laps    Sidestep w/ TB around forefoot      RTB 10'x3    A/P on WB  20x  20x  30x  AP/ML  20x  1' balance    Walk on toes    25'x4     Walk on heels    20'x4     Walk w/ varied speeds, retro walk, quick turns    LS-8'     Stand gastroc stretch on slant board      14"V1  20"x3                                         Ther Ex 8' 10'  25'  8' 30'  15'   Leg press   B/L 60# 2x10  Uni B/L 60# 2x10  Uni  30# 2x10  Uni 30# 2x15    Calf press 70# B/L 2x10, 30# uni R 2x10 Uni 30# 2x10   B/L 70# 2x10  SL HR 30# 2x10   SL HR 30# 2x10   SL HR 30# 2x15    B/L HR w/ SL eccentric lowering    2x10 R  2x10 R  2x10 R Uni HR 2x10    Ankle PF Blue TB 2x20  PF 2x10        Ankle inv/ever w/ TB    RTB 2x10 ea   RTB 2x10 ea     HEP Issued & reviewed        Bridges w/ alt LE kick      2x10 R/L                       Ther Activity         Step-up and over  6" 2x10 R  6" 2x10 R    6" 2x10 R 6" 2x10 R             Gait Training         TM gait training w/ emphasis on push-off  1 5mphx5'  1 7mphx5'   2 0-2 2 mph x5' 2 0-2 2 mph x5'  2 0-2 2 mph x6'             Modalities                                 HEP:  Access Code: [email or web address]  URL: [email or web address]  Date: 04/27/2022  Prepared by: Teodoro Alexander    Exercises  · Ankle and Toe Plantarflexion with Resistance - 1 x daily - 7 x weekly - 3 sets - 10 reps - 5 second hold  · Seated Ankle Alphabet - 2 x daily - 7 x weekly  · Single Leg Stance - 1 x daily - 7 x weekly - 3 sets - 10 reps - 10 second hold  · Tandem Stance - 1 x daily - 7 x weekly - 1 sets - 10 reps - 20 second hold  · Long Sitting Calf Stretch with Strap - 1 x daily - 7 x weekly - 1 sets - 5 reps - 20-30 secon hold

## 2022-05-27 ENCOUNTER — APPOINTMENT (OUTPATIENT)
Dept: PHYSICAL THERAPY | Facility: CLINIC | Age: 52
End: 2022-05-27
Payer: COMMERCIAL

## 2022-05-27 ENCOUNTER — TELEPHONE (OUTPATIENT)
Dept: PHYSICAL THERAPY | Facility: CLINIC | Age: 52
End: 2022-05-27

## 2022-05-27 NOTE — TELEPHONE ENCOUNTER
Patient called and left message to cancel today's appointment with no reason given  [address] [place] returned call to confirm Tuesday appt, with no answer received  Left message with next two appointment times on voicemail      Harmony Reyna, PT, MS, NCS  ABPTS Neurologic Certified Specialist  [place] Licence #[id number]

## 2022-05-31 ENCOUNTER — OFFICE VISIT (OUTPATIENT)
Dept: PHYSICAL THERAPY | Facility: CLINIC | Age: 52
End: 2022-05-31
Payer: COMMERCIAL

## 2022-05-31 DIAGNOSIS — S86.111S: ICD-10-CM

## 2022-05-31 DIAGNOSIS — Z98.890 STATUS POST OSTEOTOMY: ICD-10-CM

## 2022-05-31 DIAGNOSIS — M21.41 PES PLANOVALGUS, ACQUIRED, RIGHT: Primary | ICD-10-CM

## 2022-05-31 PROCEDURE — 97110 THERAPEUTIC EXERCISES: CPT

## 2022-05-31 PROCEDURE — 97112 NEUROMUSCULAR REEDUCATION: CPT

## 2022-05-31 NOTE — PROGRESS NOTES
Daily Note     Today's date: 2022  Patient name: Radha Rocha  : 1970  MRN: [id number]  Referring provider: Sherryle Ends, PA-C  Dx:   Encounter Diagnosis     ICD-10-CM    1  Pes planovalgus, acquired, right  M21 41    2  Status post osteotomy  Z98 890    3  Traumatic rupture of posterior tibial tendon, right, sequela  S86 111S                   Subjective: pt reports she did the most walking she has done in a long time, she had to take some advil due to some increased soreness in her foot but overall it went a lot better than she thought it would  Objective: See treatment diary below      Assessment: Tolerated treatment well  Focus on SLS stance and stability today, pt dem improvement in balance and ROM in R LE during SLS and functional descending of step  Patient would benefit from continued PT      Plan: Continue per plan of care        Precautions: HTN  Daily Exercise Log:    Date 22        Visit # 7        Re-eval                           Manual                                             Neuro Re-Ed 20'        SLS on foam  3-5"x10        Fwd amb on foam BB          Tandem stance         REO/EC on foam         High march         Tandem walk on BB          Sidestep w/ TB around forefoot RTB 10'x3        A/P on WB SL AP taps w/ UE support 20x   Balance AP/ML 1' ea         Walk on toes         Walk on heels         Walk w/ varied speeds, retro walk, quick turns         Stand gastroc stretch on slant board  20"x3         FSU w/ high knee onto BOSU  2x10 min UE support         LSU w/ high knee onto BOSU  2x10 min UE support                           Ther Ex 20'        Leg press Uni 30#         Calf press 70# B/L   30# uni R         B/L HR w/ SL eccentric lowering  Uni HR 2x15         Ankle PF BTB         Ankle inv/ever w/ TB  RTB         HEP         Bridges w/ alt LE kick                            Ther Activity         Step-up and over 8" 2x10 R                  Gait Training         TM gait training w/ emphasis on push-off 2 0-2 2 mph x6'                  Modalities                                 HEP:  Access Code: [email or web address]  URL: [email or web address]  Date: 04/27/2022  Prepared by: Patric Chu    Exercises  · Ankle and Toe Plantarflexion with Resistance - 1 x daily - 7 x weekly - 3 sets - 10 reps - 5 second hold  · Seated Ankle Alphabet - 2 x daily - 7 x weekly  · Single Leg Stance - 1 x daily - 7 x weekly - 3 sets - 10 reps - 10 second hold  · Tandem Stance - 1 x daily - 7 x weekly - 1 sets - 10 reps - 20 second hold  · Long Sitting Calf Stretch with Strap - 1 x daily - 7 x weekly - 1 sets - 5 reps - 20-30 secon hold

## 2022-06-02 ENCOUNTER — EVALUATION (OUTPATIENT)
Dept: PHYSICAL THERAPY | Facility: CLINIC | Age: 52
End: 2022-06-02
Payer: COMMERCIAL

## 2022-06-02 DIAGNOSIS — S86.111S: ICD-10-CM

## 2022-06-02 DIAGNOSIS — M21.41 PES PLANOVALGUS, ACQUIRED, RIGHT: Primary | ICD-10-CM

## 2022-06-02 DIAGNOSIS — Z98.890 STATUS POST OSTEOTOMY: ICD-10-CM

## 2022-06-02 PROCEDURE — 97110 THERAPEUTIC EXERCISES: CPT

## 2022-06-02 PROCEDURE — 97112 NEUROMUSCULAR REEDUCATION: CPT

## 2022-06-02 PROCEDURE — 97530 THERAPEUTIC ACTIVITIES: CPT

## 2022-06-02 NOTE — PROGRESS NOTES
PT Re-Evaluation  and PT Discharge    Today's date: 2022  Patient name: Sherine Saez  : 1970  MRN: [id number]  Referring provider: Dalton Hernandes PA-C  Dx:   Encounter Diagnosis     ICD-10-CM    1  Pes planovalgus, acquired, right  M21 41    2  Status post osteotomy  Z98 890    3  Traumatic rupture of posterior tibial tendon, right, sequela  S86 111S        Start Time: 1445  Stop Time: 1530  Total time in clinic (min): 45 minutes    Assessment  Assessment details: 22: Patient has demonstrated significant improvement in gait pattern, balance, ankle control and strength since start of care  Patient has achieved most of her goals, with patient acknowledging that some progress will come with time and being in various situations  Patient is independent with HEP and is in agreement with discharge at this time  All questions asked and answered, with good prognosis noted  At IE: Sherine Saez is a 46 y o  female who presents with pain, decreased strength, decreased ROM, decreased joint mobility, ambulatory dysfunction and balance dysfunction  She returns to PT after a 2 month absence due to other medical issues, including a need for BP management and then delayed return due to recent bout of bronchitis  Due to the above impairments, patient has difficulty performing ADL's, recreational activities, ambulation, stair negotiation, and transfers  Patient's clinical presentation is consistent with their referring diagnosis of Traumatic rupture of posterior tibial tendon, right, sequela (s/p surgical repair/reconstruction), and  pes planovalgus right  Patient has been educated in weight bearing status, home exercise program and plan of care   Patient would benefit from skilled physical therapy services to address their aforementioned functional limitations and progress towards prior level of function and independence with home exercise program      Understanding of Dx/Px/POC: good   Prognosis: good    Goals  Short Term Goals 4 WEEKS [employer] Date (5/25/22)  1  Independent and consistent with updated HEP  MET  2  Improve AROM R ankle DF to 10 degrees AROM to assist with performing reciprocal stairs  Nearly met  3  Improve push-off of R ankle at self-selected speeds  MET  4  Improve R ankle PF strength to complete 20 reps of heel raises at 60# unilaterally  MET    Long Term Goals 8 WEEKS: Target Date (6/22/22)  1  Improve R ankle strength to 5/5  MET  2  Patient able to complete reciprocal stairs w/o handrail while carrying laundry basket without difficulty or imbalance for safe return to household tasks  MET  3  SLS x 20" or more on R LE  Not met  4  Independent and able to complete quick turns, increasing gait speed, and increase mobility for return to prior level of function  MET  5  Independent with comprehensive HEP for continuation of stabilization and balance training  MET    Plan  Plan details:     Planned therapy interventions: home exercise program  Duration in weeks: 8  Plan of Care beginning date: 4/27/2022  Plan of Care expiration date: 6/22/2022  Treatment plan discussed with: patient        Subjective Evaluation    History of Present Illness  Mechanism of injury: Patient sustained a fall last year with L wrist fracture and onset of R ankle pain at that time  Patient underwent skilled PT wtihout resolution of the problem at this time  Patient was finally identified to have a posterior tibial tendon rupture, with pt now s/p surgical repair of posterior tibial tendon rupture with osteotomies and flat foot repair performed 10/20/21 by Dr Harshil Vazquez  Patient was progressing well with physical therapy with progression to full weight bearing and gradually improving strength and ROM when she began to have issues with her blood pressure, requiring her to take a break from physical therapy at her physician's instruction   Patient now cleared to return to PT, with continued deficits in gait, balance, push-off and R ankle strength reported  Not a recurrent problem   Quality of life: good    Pain  Current pain ratin  At best pain ratin  At worst pain ratin  Location: R ankle  Quality: dull ache  Relieving factors: rest  Aggravating factors: running  Progression: improved    Social Support  Steps to enter house: yes  Stairs in house: yes   Lives in: multiple-level home  Lives with: spouse    Employment status: not working  Exercise history: prior to fall in , pt was doing yoga consistently, walking regularly          Objective  ROM:   L   R  R     22  Ankle dorsiflexion 5/12   0/10  5/12  Ankle Inversion  50   40  45  Ankle Eversion 17   5/15   5/16    MMT:    L   R   R     22  Ankle dorsiflexion 5/5   4+/5   5/5  Ankle inversion  5/5   4+/5   5/5   Ankle eversion  5/5   4+/5   5/5  Ankle PF  10 uni heel raises Unable to perform Able to perform 10 uni heel raises w/ varied height        std uni heel raise Completed 90# uni heel raise on leg press w/ full ROM        Completed 30# uni         heel raise w/ full ROM        On leg press     Gait/stairs:   22: Patient ambulating with improving gait, balance and confidence, with improved gait speed and R push-off noted  Completed 10MWT in 7 77 sec at self-selected speed and 6 14 sec at "fast" speed  Ascending and descending 8" step with good control, and reports ability to descend stairs while carrying items such as laundry basket at home  22: Ambulating without AD or bracing, with patient noted to have gait deviations including decreased R push-off, difficulty with increasing speed, and quick turns  Patient also noted to have poor eccentric control with descending stairs, increased effort required to ascend stairs, and decreased confidence  Transfers:   22: Improved ability to get onto/off the floor, with caution still noted  Independent with functional transfers     22: Difficulty arising from the floor, with decreased ability to shift weight between legs  No impairments with sit to stand transfers    Balance:   6/2/22: Tandem stance L lead: >60 sec, min sway, good ankle responses  SLS: L:  28 03 sec R: 8 17 sec w/ decreased ability to shift wt to maintain balance  4/27/22: Tandem Stance   L lead: 26 72 sec mod-max sway, R lead: >60 sec min sway  Decreased ankle responsiveness noted, without reactive balance responses demonstrated to maintain balance in R foot & ankle  Single leg stance on firm L: 20 97 sec, R: 3 03 sec with poor ankle reactions noted           Precautions: HTN  Daily Exercise Log:    Date 5/31/22 6/2/22       Visit # 7 8       Re-eval                           Manual                                             Neuro Re-Ed 20' 9'       SLS on foam  3-5"x10 On firm 2 trials       Fwd amb on foam BB          Tandem stance  R/L lead       REO/EC on foam         High march         Tandem walk on BB          Sidestep w/ TB around forefoot RTB 10'x3        A/P on WB SL AP taps w/ UE support 20x   Balance AP/ML 1' ea         Walk on toes         Walk on heels         Walk w/ varied speeds, retro walk, quick turns         Stand gastroc stretch on slant board  20"x3  Manual 3x20"       FSU w/ high knee onto BOSU  2x10 min UE support         LSU w/ high knee onto BOSU  2x10 min UE support                           Ther Ex 20' 20'       Leg press Uni 30#         Calf press 70# B/L   30# uni R  90# uni 10x R       B/L HR w/ SL eccentric lowering  Uni HR 2x15  Uni HR 2x10       Ankle PF BTB  Blue TB 2x15       Ankle inv/ever w/ TB  RTB  RTB 2x15       HEP  Updated & reviewed       Bridges w/ alt LE kick          ROM/MMT  LS                Ther Activity  11'       Step-up and over 8" 2x10 R  8" step 2x10 R                Gait Training         TM gait training w/ emphasis on push-off 2 0-2 2 mph x6'  2 0-2 2 mph incline 2 5-5 0 x 6'                Modalities HEP:  Access Code: [email or web address]  URL: [email or web address]  Date: 04/27/2022  Updated: 06/02/2022  Prepared by: Roma Leonard    Exercises  · Ankle and Toe Plantarflexion with Resistance - 1 x daily - 3-4 x weekly - 2 sets - 15 reps - 5 second hold  · Seated Ankle Alphabet - 2 x daily - 7 x weekly  · Single Leg Stance - 1 x daily - 7 x weekly - 3 sets - 10 reps - 10 second hold  · Tandem Stance - 1 x daily - 7 x weekly - 1 sets - 10 reps - 20 second hold  · Long Sitting Calf Stretch with Strap - 1 x daily - 7 x weekly - 1 sets - 5 reps - 20-30 secon hold  · Long Sitting Ankle Inversion with Anchored Resistance - 1 x daily - 3-4 x weekly - 2 sets - 15 reps  · Ankle Eversion with Resistance - 1 x daily - 3-4 x weekly - 2 sets - 15 reps  · Ankle Dorsiflexion with Resistance - 1 x daily - 7 x weekly - 2 sets - 15 reps  · Single Leg Stance - 1 x daily - 7 x weekly - 3 sets - 10 reps  · Forward Hops in [place] - 1 x daily - 7 x weekly - 3 sets - 10 reps  · Lateral Hops in [place] - 1 x daily - 7 x weekly - 3 sets - 10 reps  · Single Leg Stance Ball Toss with Caregiver - 1 x daily - 7 x weekly - 3 sets - 10 reps  · Forward and Backward Small, Big Hops in [place] - 1 x daily - 7 x weekly - 3 sets - 10 reps  · Tree Pose in [place] with Pool Noodle - 1 x daily - 7 x weekly - 3 sets - 10 reps  · Reverse [place] in [place] with [employer] - 1 x daily - 7 x weekly - 3 sets - 10 reps  · Full Triangle Pose in [place] with Pool Noodle - 1 x daily - 7 x weekly - 3 sets - 10 reps  · [place] I in [place] with [employer] - 1 x daily - 7 x weekly - 3 sets - 10 reps  · [place] II in [place] with [employer] - 1 x daily - 7 x weekly - 3 sets - 10 reps  · [place] III in [place] with [employer] - 1 x daily - 7 x weekly - 3 sets - 10 reps  · Chair Pose in [place] with Pool Noodle - 1 x daily - 7 x weekly - 3 sets - 10 reps

## 2022-06-07 ENCOUNTER — APPOINTMENT (OUTPATIENT)
Dept: PHYSICAL THERAPY | Facility: CLINIC | Age: 52
End: 2022-06-07
Payer: COMMERCIAL

## 2022-06-09 ENCOUNTER — APPOINTMENT (OUTPATIENT)
Dept: PHYSICAL THERAPY | Facility: CLINIC | Age: 52
End: 2022-06-09
Payer: COMMERCIAL

## 2022-06-13 ENCOUNTER — APPOINTMENT (OUTPATIENT)
Dept: PHYSICAL THERAPY | Facility: CLINIC | Age: 52
End: 2022-06-13
Payer: COMMERCIAL

## 2022-06-15 ENCOUNTER — APPOINTMENT (OUTPATIENT)
Dept: PHYSICAL THERAPY | Facility: CLINIC | Age: 52
End: 2022-06-15
Payer: COMMERCIAL

## 2022-06-16 ENCOUNTER — APPOINTMENT (OUTPATIENT)
Dept: PHYSICAL THERAPY | Facility: CLINIC | Age: 52
End: 2022-06-16
Payer: COMMERCIAL

## 2022-06-30 ENCOUNTER — OFFICE VISIT (OUTPATIENT)
Dept: OTOLARYNGOLOGY | Facility: CLINIC | Age: 52
End: 2022-06-30
Payer: COMMERCIAL

## 2022-06-30 VITALS — HEIGHT: 69 IN | BODY MASS INDEX: 34.07 KG/M2 | WEIGHT: 230 LBS | TEMPERATURE: 98 F

## 2022-06-30 DIAGNOSIS — M26.609 TMJ (TEMPOROMANDIBULAR JOINT SYNDROME): ICD-10-CM

## 2022-06-30 DIAGNOSIS — H92.01 OTALGIA, RIGHT: ICD-10-CM

## 2022-06-30 DIAGNOSIS — R09.82 POST-NASAL DRIP: ICD-10-CM

## 2022-06-30 DIAGNOSIS — J32.8 OTHER CHRONIC SINUSITIS: Primary | ICD-10-CM

## 2022-06-30 DIAGNOSIS — H93.11 RIGHT-SIDED TINNITUS: ICD-10-CM

## 2022-06-30 DIAGNOSIS — R07.0 THROAT PAIN: ICD-10-CM

## 2022-06-30 DIAGNOSIS — M54.2 NECK PAIN: ICD-10-CM

## 2022-06-30 DIAGNOSIS — R51.9 FACIAL PAIN: ICD-10-CM

## 2022-06-30 PROBLEM — J32.9 CHRONIC SINUSITIS: Status: ACTIVE | Noted: 2022-06-30

## 2022-06-30 PROCEDURE — 99214 OFFICE O/P EST MOD 30 MIN: CPT | Performed by: NURSE PRACTITIONER

## 2022-06-30 NOTE — PROGRESS NOTES
Assessment/Plan:    Chronic sinusitis  Since last visit has developed constant Sinus pressure, green mucus  Previously treated with oral steroids, nasal steroids, saline rinses  Difficulty tolerating saline rinses  Reviewed actual Ct scan images from 05/2021 indicating clear paranasal sinuses, turbinate hypertrophy, DNS, patchy inflammation bilateral ethmoids, narrow nasal passages  Options include Flonase, Astelin spray, Saline rinses (elías med sinus rinses)  Agreed to Continue Flonase and Astelin nasal sprays and obtain updated Ct sinus as symptoms are worsening despite treatment  Also obtain lab allergy testing  Follow up after testing        Right-sided tinnitus  Reviewed recurrent otalgia and possible causes   Discussed ETD, dental changes, vs ear processes  Noted crepitus of right TM joint with increased muscle tension bilaterally   No parotid swelling, no changes of TM or outer ear  No evidence of infection of either ear on exam today  Audiogram last visit indicating normal/borderline bilateral hearing, tymps type A     Discussed tinnitus and possible causes in presence of normal hearing including MS, TMJ syndrome, vs ear related changes  Options for bilateral tinnitus including adding background noise, tinnitus retraining therapy, masking device, Resound tinnitus giovanny, Acupuncture, or acceptance  Limit caffeine and ibuprofen intake  Consider Flonase daily for eustachian tube dysfunction  No specific medications or surgery indicated for treatment of tinnitus  TMJ (temporomandibular joint syndrome)  On exam crepitus bilateral TMJ   Discussed impact of MS on tinnitus and neck/jaw pain  Prior PT ineffective for improvement  Discussed TMJ syndrome in detail  Reviewed options for treatment including soft diet, jaw rest, NSAIDs, warm compresses, massage, oral appliance, or consultation with oral surgeon        Recommend warm compresses, massage to the area, consider acupuncture Diagnoses and all orders for this visit:    Other chronic sinusitis  -     CT sinus wo contrast; Future  -     [facility] Allergy Panel, Adult; Future    Neck pain    Facial pain    Post-nasal drip    TMJ (temporomandibular joint syndrome)    Throat pain    Otalgia, right    Right-sided tinnitus          Subjective:      Patient ID: Darrell Linton is a 46 y o  female  Presents today as a follow up due to multiple ENT concerns  Occurring for past few months  Facial swelling extending to temple right ear  Ringing in right ear  Feels pressure behind the right sinus  This persists even after recent surgery  Surgery  August 2020 for deviated septum, Dr Odalys Metzger in [facility]  History sinus infections  History allergies  Recurrent sore throat over past year  Treated with multiple round of antibiotics  Following Dr Chapis Kelley for history MS  After last visit tried saline rinses  On third day developed sinus discomfort and felt fluid became stuck in sinuses  PCP treated with antibiotic  Then had yellow mucus coming from sinuses  Since finished antibiotic feeling pressure in sinuses again  Ringing in ears worse with sinus pressure  Tinnitus and ringing did improve after oral steroids  Discomfort along right jawline  Coughs and feels it in her ear  Constant cough  Dryness in throat then coughs  Constant discomfort right side of head  Upper teeth discomfort  Feels in back throat near tonsils has discomfort on nightly basis  The following portions of the patient's history were reviewed and updated as appropriate: allergies, current medications, past family history, past medical history, past social history, past surgical history and problem list     Review of Systems   Constitutional: Negative  HENT: Positive for postnasal drip, sinus pressure and sore throat  Negative for congestion, ear discharge, ear pain, hearing loss, nosebleeds, rhinorrhea, sinus pain, tinnitus and voice change      Eyes: Negative  Respiratory: Negative for chest tightness and shortness of breath  Cardiovascular: Negative  Gastrointestinal: Negative  Endocrine: Negative  Musculoskeletal: Negative  Skin: Negative for color change  Neurological: Negative for dizziness, numbness and headaches  Psychiatric/Behavioral: Negative  Objective:      Temp 98 °F (36 7 °C) (Temporal)   Ht 5' 9" (1 753 m)   Wt 104 kg (230 lb)   BMI 33 97 kg/m²          Physical Exam  Constitutional:       Appearance: She is well-developed  HENT:      Head: Normocephalic  Right Ear: Hearing, tympanic membrane, ear canal and external ear normal  No decreased hearing noted  No drainage or tenderness  Tympanic membrane is not perforated or erythematous  Left Ear: Hearing, tympanic membrane, ear canal and external ear normal  No decreased hearing noted  No drainage or tenderness  Tympanic membrane is not perforated or erythematous  Nose: Nose normal  No nasal deformity or septal deviation  Mouth/Throat:      Mouth: Mucous membranes are not pale and not dry  No oral lesions  Dentition: Normal dentition  Pharynx: Uvula midline  No oropharyngeal exudate  Neck:      Trachea: No tracheal deviation  Cardiovascular:      Rate and Rhythm: Normal rate  Pulmonary:      Effort: Pulmonary effort is normal  No accessory muscle usage or respiratory distress  Musculoskeletal:      Right shoulder: Normal range of motion  Cervical back: Full passive range of motion without pain, normal range of motion and neck supple  Lymphadenopathy:      Cervical: No cervical adenopathy  Skin:     General: Skin is warm and dry  Neurological:      Mental Status: She is alert and oriented to person, place, and time  Cranial Nerves: No cranial nerve deficit  Sensory: No sensory deficit  Psychiatric:         Behavior: Behavior is cooperative

## 2022-06-30 NOTE — ASSESSMENT & PLAN NOTE
Since last visit has developed constant Sinus pressure, green mucus  Previously treated with oral steroids, nasal steroids, saline rinses  Difficulty tolerating saline rinses  Reviewed actual Ct scan images from 05/2021 indicating clear paranasal sinuses, turbinate hypertrophy, DNS, patchy inflammation bilateral ethmoids, narrow nasal passages  Options include Flonase, Astelin spray, Saline rinses (elías med sinus rinses)  Agreed to Continue Flonase and Astelin nasal sprays and obtain updated Ct sinus as symptoms are worsening despite treatment  Also obtain lab allergy testing  Follow up after testing        Right-sided tinnitus  Reviewed recurrent otalgia and possible causes   Discussed ETD, dental changes, vs ear processes  Noted crepitus of right TM joint with increased muscle tension bilaterally   No parotid swelling, no changes of TM or outer ear  No evidence of infection of either ear on exam today  Audiogram last visit indicating normal/borderline bilateral hearing, tymps type A     Discussed tinnitus and possible causes in presence of normal hearing including MS, TMJ syndrome, vs ear related changes  Options for bilateral tinnitus including adding background noise, tinnitus retraining therapy, masking device, Resound tinnitus giovanny, Acupuncture, or acceptance  Limit caffeine and ibuprofen intake  Consider Flonase daily for eustachian tube dysfunction  No specific medications or surgery indicated for treatment of tinnitus  TMJ (temporomandibular joint syndrome)  On exam crepitus bilateral TMJ   Discussed impact of MS on tinnitus and neck/jaw pain  Prior PT ineffective for improvement  Discussed TMJ syndrome in detail  Reviewed options for treatment including soft diet, jaw rest, NSAIDs, warm compresses, massage, oral appliance, or consultation with oral surgeon        Recommend warm compresses, massage to the area, consider acupuncture

## 2022-06-30 NOTE — PATIENT INSTRUCTIONS
Jaw - warm compresses, massage to the area, acupuncture    Options for bilateral tinnitus including adding background noise, tinnitus retraining therapy, masking device, Resound tinnitus giovanny, Acupuncture, or acceptance  Limit caffeine and ibuprofen intake  Consider Flonase daily for eustachian tube dysfunction  No specific medications or surgery indicated for treatment of tinnitus       Continue Flonase and Astelin nasal sprays    Hold oral antihistamine medications for 5 days prior to allergy testing - includes Pepcid and Allegra

## 2022-07-21 ENCOUNTER — HOSPITAL ENCOUNTER (OUTPATIENT)
Dept: RADIOLOGY | Facility: HOSPITAL | Age: 52
Discharge: HOME | End: 2022-07-21
Payer: COMMERCIAL

## 2022-07-21 DIAGNOSIS — J32.8 OTHER CHRONIC SINUSITIS: ICD-10-CM

## 2022-07-21 PROCEDURE — 70486 CT MAXILLOFACIAL W/O DYE: CPT

## 2022-07-21 PROCEDURE — G1004 CDSM NDSC: HCPCS

## 2022-08-16 ENCOUNTER — TELEPHONE (OUTPATIENT)
Dept: NEUROLOGY | Facility: CLINIC | Age: 52
End: 2022-08-16

## 2022-08-16 NOTE — TELEPHONE ENCOUNTER
Contacted the patient to confirm the appointment on 08/23/22, the patient requested the appointment be rescheduled  New appointment 01/04/23

## 2022-08-31 ENCOUNTER — PATIENT MESSAGE (OUTPATIENT)
Dept: NEUROLOGY | Facility: CLINIC | Age: 52
End: 2022-08-31

## 2022-08-31 DIAGNOSIS — G62.9 NEUROPATHY: Primary | ICD-10-CM

## 2022-09-02 RX ORDER — GABAPENTIN 300 MG/1
300 CAPSULE ORAL 2 TIMES DAILY
Qty: 60 CAPSULE | Refills: 3 | Status: SHIPPED | OUTPATIENT
Start: 2022-09-02

## 2022-09-16 ENCOUNTER — APPOINTMENT (OUTPATIENT)
Dept: LAB | Facility: HOSPITAL | Age: 52
End: 2022-09-16
Payer: COMMERCIAL

## 2022-09-16 ENCOUNTER — APPOINTMENT (OUTPATIENT)
Dept: RADIOLOGY | Facility: CLINIC | Age: 52
End: 2022-09-16
Payer: COMMERCIAL

## 2022-09-16 ENCOUNTER — OFFICE VISIT (OUTPATIENT)
Dept: OBGYN CLINIC | Facility: CLINIC | Age: 52
End: 2022-09-16
Payer: COMMERCIAL

## 2022-09-16 VITALS
BODY MASS INDEX: 34.36 KG/M2 | DIASTOLIC BLOOD PRESSURE: 75 MMHG | TEMPERATURE: 98.2 F | HEIGHT: 69 IN | WEIGHT: 232 LBS | HEART RATE: 78 BPM | SYSTOLIC BLOOD PRESSURE: 122 MMHG | RESPIRATION RATE: 19 BRPM

## 2022-09-16 DIAGNOSIS — J32.9 CHRONIC SINUSITIS, UNSPECIFIED LOCATION: ICD-10-CM

## 2022-09-16 DIAGNOSIS — M25.541 PAIN IN THUMB JOINT WITH MOVEMENT OF RIGHT HAND: ICD-10-CM

## 2022-09-16 DIAGNOSIS — Z11.59 SPECIAL SCREENING EXAMINATION FOR VIRAL DISEASE: ICD-10-CM

## 2022-09-16 DIAGNOSIS — Z01.812 ENCOUNTER FOR PREPROCEDURAL LABORATORY EXAMINATION: ICD-10-CM

## 2022-09-16 DIAGNOSIS — M18.11 ARTHRITIS OF CARPOMETACARPAL (CMC) JOINT OF RIGHT THUMB: Primary | ICD-10-CM

## 2022-09-16 DIAGNOSIS — M25.542 PAIN IN THUMB JOINT WITH MOVEMENT OF LEFT HAND: ICD-10-CM

## 2022-09-16 DIAGNOSIS — M18.11 PRIMARY OSTEOARTHRITIS OF FIRST CARPOMETACARPAL JOINT OF RIGHT HAND: ICD-10-CM

## 2022-09-16 DIAGNOSIS — M25.541 ARTHRALGIA OF RIGHT HAND: Primary | ICD-10-CM

## 2022-09-16 DIAGNOSIS — Z11.59 SCREENING EXAMINATION FOR POLIOMYELITIS: ICD-10-CM

## 2022-09-16 DIAGNOSIS — Z01.812 PRE-OPERATIVE LABORATORY EXAMINATION: ICD-10-CM

## 2022-09-16 DIAGNOSIS — M18.11 ARTHRITIS OF CARPOMETACARPAL (CMC) JOINT OF RIGHT THUMB: ICD-10-CM

## 2022-09-16 DIAGNOSIS — J32.8 OTHER CHRONIC SINUSITIS: ICD-10-CM

## 2022-09-16 LAB
ANION GAP SERPL CALCULATED.3IONS-SCNC: 9 MMOL/L (ref 4–13)
APTT PPP: 27 SECONDS (ref 23–37)
ATRIAL RATE: 74 BPM
BASOPHILS # BLD AUTO: 0.06 THOUSANDS/ΜL (ref 0–0.1)
BASOPHILS NFR BLD AUTO: 1 % (ref 0–1)
BUN SERPL-MCNC: 11 MG/DL (ref 5–25)
CALCIUM SERPL-MCNC: 9.4 MG/DL (ref 8.3–10.1)
CHLORIDE SERPL-SCNC: 102 MMOL/L (ref 96–108)
CO2 SERPL-SCNC: 26 MMOL/L (ref 21–32)
CREAT SERPL-MCNC: 0.76 MG/DL (ref 0.6–1.3)
EOSINOPHIL # BLD AUTO: 0.11 THOUSAND/ΜL (ref 0–0.61)
EOSINOPHIL NFR BLD AUTO: 1 % (ref 0–6)
ERYTHROCYTE [DISTWIDTH] IN BLOOD BY AUTOMATED COUNT: 13.1 % (ref 11.6–15.1)
GFR SERPL CREATININE-BSD FRML MDRD: 91 ML/MIN/1.73SQ M
GLUCOSE SERPL-MCNC: 99 MG/DL (ref 65–140)
HCT VFR BLD AUTO: 37.5 % (ref 34.8–46.1)
HGB BLD-MCNC: 12.3 G/DL (ref 11.5–15.4)
IMM GRANULOCYTES # BLD AUTO: 0.04 THOUSAND/UL (ref 0–0.2)
IMM GRANULOCYTES NFR BLD AUTO: 1 % (ref 0–2)
INR PPP: 0.87 (ref 0.84–1.19)
LYMPHOCYTES # BLD AUTO: 2.84 THOUSANDS/ΜL (ref 0.6–4.47)
LYMPHOCYTES NFR BLD AUTO: 36 % (ref 14–44)
MCH RBC QN AUTO: 30 PG (ref 26.8–34.3)
MCHC RBC AUTO-ENTMCNC: 32.8 G/DL (ref 31.4–37.4)
MCV RBC AUTO: 92 FL (ref 82–98)
MONOCYTES # BLD AUTO: 0.61 THOUSAND/ΜL (ref 0.17–1.22)
MONOCYTES NFR BLD AUTO: 8 % (ref 4–12)
NEUTROPHILS # BLD AUTO: 4.16 THOUSANDS/ΜL (ref 1.85–7.62)
NEUTS SEG NFR BLD AUTO: 53 % (ref 43–75)
NRBC BLD AUTO-RTO: 0 /100 WBCS
P AXIS: 38 DEGREES
PLATELET # BLD AUTO: 250 THOUSANDS/UL (ref 149–390)
PMV BLD AUTO: 11 FL (ref 8.9–12.7)
POTASSIUM SERPL-SCNC: 4.1 MMOL/L (ref 3.5–5.3)
PR INTERVAL: 138 MS
PROTHROMBIN TIME: 11.9 SECONDS (ref 11.6–14.5)
QRS AXIS: 31 DEGREES
QRSD INTERVAL: 92 MS
QT INTERVAL: 410 MS
QTC INTERVAL: 455 MS
RBC # BLD AUTO: 4.1 MILLION/UL (ref 3.81–5.12)
SODIUM SERPL-SCNC: 137 MMOL/L (ref 135–147)
T WAVE AXIS: 39 DEGREES
VENTRICULAR RATE: 74 BPM
WBC # BLD AUTO: 7.82 THOUSAND/UL (ref 4.31–10.16)

## 2022-09-16 PROCEDURE — 85025 COMPLETE CBC W/AUTO DIFF WBC: CPT

## 2022-09-16 PROCEDURE — 93005 ELECTROCARDIOGRAM TRACING: CPT

## 2022-09-16 PROCEDURE — 36415 COLL VENOUS BLD VENIPUNCTURE: CPT

## 2022-09-16 PROCEDURE — 80048 BASIC METABOLIC PNL TOTAL CA: CPT

## 2022-09-16 PROCEDURE — 73130 X-RAY EXAM OF HAND: CPT

## 2022-09-16 PROCEDURE — 85730 THROMBOPLASTIN TIME PARTIAL: CPT

## 2022-09-16 PROCEDURE — 85610 PROTHROMBIN TIME: CPT

## 2022-09-16 PROCEDURE — 93010 ELECTROCARDIOGRAM REPORT: CPT | Performed by: INTERNAL MEDICINE

## 2022-09-16 PROCEDURE — 99214 OFFICE O/P EST MOD 30 MIN: CPT | Performed by: ORTHOPAEDIC SURGERY

## 2022-09-16 NOTE — PROGRESS NOTES
Assessment/Plan:  1  Arthritis of carpometacarpal [place]) joint of right thumb  Ambulatory Referral to PT/OT Hand Therapy    Case request operating room: [address] [facility]    Ambulatory referral to [facility]    PAT Covid Screening    CBC and differential    APTT    Protime-INR    Basic metabolic panel    EKG 12 lead    Case request operating room: ARTHROPLASTY THUMB WEILBY    CANCELED: CBC and differential    CANCELED: APTT    CANCELED: Protime-INR    CANCELED: Basic metabolic panel   2  Pain in thumb joint with movement of left hand  XR hand 3+ vw left   3  Pain in thumb joint with movement of right hand  XR hand 3+ vw right    Case request operating room: [address] [facility]    Ambulatory referral to [facility]    PAT Covid Screening    CBC and differential    APTT    Protime-INR    Basic metabolic panel    EKG 12 lead    Case request operating room: ARTHROPLASTY THUMB WEILBY    CANCELED: CBC and differential    CANCELED: APTT    CANCELED: Protime-INR    CANCELED: Basic metabolic panel   4  Encounter for preprocedural laboratory examination  Ambulatory referral to [facility]    PAT Covid Screening    CBC and differential    APTT    Protime-INR    Basic metabolic panel    EKG 12 lead    CANCELED: CBC and differential    CANCELED: APTT    CANCELED: Protime-INR    CANCELED: Basic metabolic panel   5  Special screening examination for viral disease  Ambulatory referral to [facility]    PAT Covid Screening    CBC and differential    APTT    Protime-INR    Basic metabolic panel    EKG 12 lead    CANCELED: CBC and differential    CANCELED: APTT    CANCELED: Protime-INR    CANCELED: Basic metabolic panel     Scribe Attestation    I,:  Ana Rosa Goodman MA am acting as a scribe while in the presence of the attending physician :       I,:  Nii Roger MD personally performed the services described in this documentation    as scribed in my presence  :             46year-old female with right thumb CMC arthritis  X-rays were reviewed in the office today which demonstrate severe right thumb CMC arthritis  She is tender to palpation over the thumb CMC on exam  She has decreased pinch and APB strength  She has undergone multiple steroid injections in the past  Surgical intervention was discussed in the form of right thumb carpometacarpal arthroplasty  Risks and benefits were discussed at length  Risks of the surgery are inclusive of but not limited to bleeding, infection, nerve injury, blood clot, worsening of symptoms, not achieving the anticipated results, persistent stiffness, weakness, failure to regain full strength and ability, and the need for additional surgery  The patient verbally stated they understood those risks and would like to proceed with the surgery  Surgical consent was signed in the office today  We did discuss addressing the left side appx 3-4 months after her right  She is aware she will begin outpatient therapy appx 2 weeks postoperatively and a referral was provided for this today  She will require preoperative blood work, EKG, PCP clearance and COVID testing  I will see her the day of surgery  Subjective:   Jeanie Ren is a 46 y o  female who presents to the office today for evaluation bilateral thumb pain  She was treating previously with Dr Rosanna Mo for bilateral thumb CMC arthritis  She underwent a steroid injection at her last visit on 5/6/22 which she states provided her with good relief  She states her pain started to return over the past 3 weeks  She states her right thumb is worse than her left  She notes pain to the base of both of her thumbs which is increased with pinch and   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  She has been taking Tylenol and Advil OTC for pain  Review of Systems   Constitutional: Negative for chills and fever  HENT: Negative for drooling and sneezing  Eyes: Negative for redness  Respiratory: Negative for cough and wheezing  Gastrointestinal: Negative for nausea and vomiting  Musculoskeletal: Positive for arthralgias  Negative for joint swelling and myalgias  Neurological: Negative for weakness and numbness  Psychiatric/Behavioral: Negative for behavioral problems  The patient is not nervous/anxious            Past Medical History:   Diagnosis Date    Ankle arthritis     right is having surgery soon for this    Cancer [facility])     cervical    Disease of thyroid gland     hypo    GERD (gastroesophageal reflux disease)     History of transfusion     age 3    Hyperlipidemia     MS (congenital mitral stenosis)     patient has multiple sclerosis     Multiple sclerosis (HCC)        Past Surgical History:   Procedure Laterality Date    CARPAL TUNNEL RELEASE Right     COLONOSCOPY      HYSTERECTOMY      NE OSTEOTOMY HEEL BONE Right 10/20/2021    Procedure: OSTEOTOMY CALCANEUS, Medial slide calcaneal osteotomy, FDL tendon transfer to navicular, Cotton osteotomy, Achilles lengthening, Wilson osteotomy;  Surgeon: Ava Olson MD;  Location: [facility] MAIN OR;  Service: Orthopedics    NE REVISE MEDIAN N/CARPAL TUNNEL SURG Right 2021    Procedure: RELEASE CARPAL TUNNEL;  Surgeon: Josefina Sarabia DO;  Location: [facility] MAIN OR;  Service: Orthopedics    SINUS SURGERY      WRIST SURGERY Left        Family History   Problem Relation Age of Onset    No Known Problems Mother        Social History     Occupational History    Not on file   Tobacco Use    Smoking status: Former Smoker     Packs/day: 0 50     Years: 45 00     Pack years: 22 50     Types: Cigarettes     Quit date: 4/10/2020     Years since quittin 4    Smokeless tobacco: Never Used   Vaping Use    Vaping Use: Never used   Substance and Sexual Activity    Alcohol use: Yes     Comment: 1-2 drinks once per week    Drug use: Never    Sexual activity: Yes         Current Outpatient Medications:     azelastine (ASTELIN) 0 1 % nasal spray, 2 sprays into each nostril daily, Disp: , Rfl:     cyclobenzaprine (FLEXERIL) 10 mg tablet, Take 10 mg by mouth daily at bedtime , Disp: , Rfl:     estradiol (ESTRACE) 1 mg tablet, Take 2 mg by mouth daily at bedtime  , Disp: , Rfl:     famotidine (PEPCID) 10 mg tablet, Take 10 mg by mouth daily at bedtime, Disp: , Rfl:     fexofenadine (ALLEGRA) 60 MG tablet, Take 60 mg by mouth daily at bedtime , Disp: , Rfl:     gabapentin (Neurontin) 300 mg capsule, Take 1 capsule (300 mg total) by mouth 2 (two) times a day, Disp: 60 capsule, Rfl: 3    levothyroxine 50 mcg tablet, Take 50 mcg by mouth daily, Disp: , Rfl:     montelukast (SINGULAIR) 10 mg tablet, Take 10 mg by mouth daily at bedtime , Disp: , Rfl:     omeprazole (PriLOSEC) 40 MG capsule, TAKE 1 CAPSULE (40 MG) BY MOUTH DAILY BEFORE BREAKFAST, Disp: , Rfl:     rosuvastatin (CRESTOR) 10 MG tablet, Take 10 mg by mouth daily at bedtime , Disp: , Rfl:     fluticasone (FLONASE) 50 mcg/act nasal spray, 50 mcg into each nostril daily, Disp: , Rfl:     lisinopril (ZESTRIL) 5 mg tablet, Take 5 mg by mouth daily, Disp: , Rfl:     No Known Allergies    Objective:  Vitals:    09/16/22 1305   BP: 122/75   Pulse: 78   Resp: 19   Temp: 98 2 °F (36 8 °C)       Right Hand Exam     Muscle Strength   : 4/5     Other   Erythema: absent  Sensation: normal  Pulse: present    Comments:  Right thumb  TTP thumb CMC  + grind  4/5 APB  4/5 pinch strength with pain   Significant deformity present at the right thumb CMC joint            Physical Exam  Constitutional:       Appearance: She is well-developed  HENT:      Head: Normocephalic and atraumatic  Eyes:      General:         Right eye: No discharge  Left eye: No discharge  Conjunctiva/sclera: Conjunctivae normal    Cardiovascular:      Rate and Rhythm: Normal rate  Heart sounds: Normal heart sounds     Pulmonary:      Effort: Pulmonary effort is normal  No respiratory distress  Breath sounds: Normal breath sounds  Musculoskeletal:      Cervical back: Normal range of motion and neck supple  Comments: As noted in HPI   Skin:     General: Skin is warm and dry  Neurological:      Mental Status: She is alert and oriented to person, place, and time  Psychiatric:         Behavior: Behavior normal          Thought Content: Thought content normal          Judgment: Judgment normal          I have personally reviewed pertinent films in PACS and my interpretation is as follows:X-rays bilateral thumb performed in the office today demonstrate severe bilateral thumb CMC arthritis  The right thumb CMC joint has a large osteophyte coming off of the trapezium along the ulnar aspect  This document was created using speech voice recognition software  Grammatical errors, random word insertions, pronoun errors, and incomplete sentences are an occasional consequence of this system due to software limitations, ambient noise, and hardware issues  Any formal questions or concerns about content, text, or information contained within the body of this dictation should be directly addressed to the provider for clarification

## 2022-10-20 RX ORDER — LOSARTAN POTASSIUM 25 MG/1
25 TABLET ORAL
COMMUNITY

## 2022-10-20 NOTE — PRE-PROCEDURE INSTRUCTIONS
My Surgical Experience    The following information was developed to assist you to prepare for your operation  What do I need to do before coming to the hospital?  • Arrange for a responsible person to drive you to and from the hospital   • Arrange care for your children at home  Children are not allowed in the recovery areas of the hospital  • Plan to wear clothing that is easy to put on and take off  If you are having shoulder surgery, wear a shirt that buttons or zippers in the front  Bathing  o Shower the evening before and the morning of your surgery with an antibacterial soap  Please refer to the Pre Op Showering Instructions for Surgery Patients Sheet   o Remove nail polish and all body piercing jewelry  o Do not shave any body part for at least 24 hours before surgery-this includes face, arms, legs and upper body  Food  o Nothing to eat or drink after midnight the night before your surgery  This includes candy and chewing gum  o Exception: If your surgery is after 12:00pm (noon), you may have clear liquids such as 7-Up®, ginger ale, apple or cranberry juice, Jell-O®, water, or clear broth until 8:00 am  o Do not drink milk or juice with pulp on the morning before surgery  o Do not drink alcohol 24 hours before surgery  Medicine  o Follow instructions you received from your surgeon about which medicines you may take on the day of surgery  o If instructed to take medicine on the morning of surgery, take pills with just a small sip of water  Call your prescribing doctor for specific infroamtion on what to do if you take insulin    What should I bring to the hospital?    Bring:  • Crutches or a walker, if you have them, for foot or knee surgery  • A list of the daily medicines, vitamins, minerals, herbals and nutritional supplements you take   Include the dosages of medicines and the time you take them each day  • Glasses, dentures or hearing aids  • Minimal clothing; you will be wearing hospital sleepwear  • Photo ID; required to verify your identity  • If you have a Living Will or Power of , bring a copy of the documents  • If you have an ostomy, bring an extra pouch and any supplies you use    Do not bring  • Medicines or inhalers  • Money, valuables or jewelry    What other information should I know about the day of surgery? • Notify your surgeons if you develop a cold, sore throat, cough, fever, rash or any other illness  • Report to the Ambulatory Surgical/Same Day Surgery Unit  • You will be instructed to stop at Registration only if you have not been pre-registered  • Inform your  fi they do not stay that they will be asked by the staff to leave a phone number where they can be reached  • Be available to be reached before surgery  In the event the operating room schedule changes, you may be asked to come in earlier or later than expected    *It is important to tell your doctor and others involved in your health care if you are taking or have been taking any non-prescription drugs, vitamins, minerals, herbals or other nutritional supplements  Any of these may interact with some food or medicines and cause a reaction      Pre-Surgery Instructions:   Medication Instructions   • azelastine (ASTELIN) 0 1 % nasal spray Take day of surgery  • cyclobenzaprine (FLEXERIL) 10 mg tablet Take night before surgery   • estradiol (ESTRACE) 1 mg tablet Take night before surgery   • famotidine (PEPCID) 10 mg tablet Take night before surgery   • fexofenadine (ALLEGRA) 60 MG tablet Take night before surgery   • fluticasone (FLONASE) 50 mcg/act nasal spray Take day of surgery  • gabapentin (Neurontin) 300 mg capsule Take day of surgery  • levothyroxine 50 mcg tablet Take day of surgery     • losartan (COZAAR) 25 mg tablet Take night before surgery   • montelukast (SINGULAIR) 10 mg tablet Take night before surgery   • rosuvastatin (CRESTOR) 10 MG tablet Take night before surgery

## 2022-10-21 ENCOUNTER — TELEPHONE (OUTPATIENT)
Dept: OBGYN CLINIC | Facility: CLINIC | Age: 52
End: 2022-10-21

## 2022-10-21 DIAGNOSIS — M25.541 PAIN IN THUMB JOINT WITH MOVEMENT OF RIGHT HAND: ICD-10-CM

## 2022-10-21 DIAGNOSIS — Z11.59 SPECIAL SCREENING EXAMINATION FOR VIRAL DISEASE: ICD-10-CM

## 2022-10-21 DIAGNOSIS — M18.11 ARTHRITIS OF CARPOMETACARPAL (CMC) JOINT OF RIGHT THUMB: ICD-10-CM

## 2022-10-21 DIAGNOSIS — Z01.812 ENCOUNTER FOR PREPROCEDURAL LABORATORY EXAMINATION: ICD-10-CM

## 2022-10-21 PROCEDURE — U0003 INFECTIOUS AGENT DETECTION BY NUCLEIC ACID (DNA OR RNA); SEVERE ACUTE RESPIRATORY SYNDROME CORONAVIRUS 2 (SARS-COV-2) (CORONAVIRUS DISEASE [COVID-19]), AMPLIFIED PROBE TECHNIQUE, MAKING USE OF HIGH THROUGHPUT TECHNOLOGIES AS DESCRIBED BY CMS-2020-01-R: HCPCS | Performed by: ORTHOPAEDIC SURGERY

## 2022-10-21 PROCEDURE — U0005 INFEC AGEN DETEC AMPLI PROBE: HCPCS | Performed by: ORTHOPAEDIC SURGERY

## 2022-10-21 NOTE — TELEPHONE ENCOUNTER
Armida Irvin called in inquiring if she would need to remove her Gel/ Acrylic nails prior to surgery next week  Per Justin Rojas:  Not necessarily but for sterility reasons it is better if they remove them     I informed Armida Irvin that it's best to have them removed  She agreed and will have them taken off

## 2022-10-22 LAB — SARS-COV-2 RNA RESP QL NAA+PROBE: NEGATIVE

## 2022-10-26 ENCOUNTER — ANESTHESIA EVENT (OUTPATIENT)
Dept: PERIOP | Facility: HOSPITAL | Age: 52
End: 2022-10-26
Payer: COMMERCIAL

## 2022-10-27 ENCOUNTER — HOSPITAL ENCOUNTER (OUTPATIENT)
Facility: HOSPITAL | Age: 52
Setting detail: OUTPATIENT SURGERY
Discharge: HOME | End: 2022-10-27
Attending: ORTHOPAEDIC SURGERY | Admitting: ORTHOPAEDIC SURGERY
Payer: COMMERCIAL

## 2022-10-27 ENCOUNTER — ANESTHESIA (OUTPATIENT)
Dept: PERIOP | Facility: HOSPITAL | Age: 52
End: 2022-10-27
Payer: COMMERCIAL

## 2022-10-27 VITALS
RESPIRATION RATE: 16 BRPM | SYSTOLIC BLOOD PRESSURE: 113 MMHG | OXYGEN SATURATION: 96 % | TEMPERATURE: 97.2 F | HEART RATE: 78 BPM | HEIGHT: 69 IN | DIASTOLIC BLOOD PRESSURE: 67 MMHG | BODY MASS INDEX: 34.45 KG/M2 | WEIGHT: 232.6 LBS

## 2022-10-27 DIAGNOSIS — M18.11 PRIMARY OSTEOARTHRITIS OF FIRST CARPOMETACARPAL JOINT OF RIGHT HAND: Primary | ICD-10-CM

## 2022-10-27 PROCEDURE — C1713 ANCHOR/SCREW BN/BN,TIS/BN: HCPCS | Performed by: ORTHOPAEDIC SURGERY

## 2022-10-27 DEVICE — IMPL SYS,CMC MINI T-ROPE,1.1 MM
Type: IMPLANTABLE DEVICE | Site: THUMB | Status: FUNCTIONAL
Brand: ARTHREX®

## 2022-10-27 RX ORDER — CEFAZOLIN SODIUM 2 G/50ML
2000 SOLUTION INTRAVENOUS ONCE
Status: COMPLETED | OUTPATIENT
Start: 2022-10-27 | End: 2022-10-27

## 2022-10-27 RX ORDER — ROPIVACAINE HYDROCHLORIDE 5 MG/ML
INJECTION, SOLUTION EPIDURAL; INFILTRATION; PERINEURAL
Status: COMPLETED | OUTPATIENT
Start: 2022-10-27 | End: 2022-10-27

## 2022-10-27 RX ORDER — MIDAZOLAM HYDROCHLORIDE 2 MG/2ML
INJECTION, SOLUTION INTRAMUSCULAR; INTRAVENOUS AS NEEDED
Status: DISCONTINUED | OUTPATIENT
Start: 2022-10-27 | End: 2022-10-27

## 2022-10-27 RX ORDER — ONDANSETRON 2 MG/ML
INJECTION INTRAMUSCULAR; INTRAVENOUS AS NEEDED
Status: DISCONTINUED | OUTPATIENT
Start: 2022-10-27 | End: 2022-10-27

## 2022-10-27 RX ORDER — PROPOFOL 10 MG/ML
INJECTION, EMULSION INTRAVENOUS AS NEEDED
Status: DISCONTINUED | OUTPATIENT
Start: 2022-10-27 | End: 2022-10-27

## 2022-10-27 RX ORDER — FENTANYL CITRATE 50 UG/ML
INJECTION, SOLUTION INTRAMUSCULAR; INTRAVENOUS AS NEEDED
Status: DISCONTINUED | OUTPATIENT
Start: 2022-10-27 | End: 2022-10-27

## 2022-10-27 RX ORDER — FENTANYL CITRATE/PF 50 MCG/ML
25 SYRINGE (ML) INJECTION
Status: DISCONTINUED | OUTPATIENT
Start: 2022-10-27 | End: 2022-10-27 | Stop reason: HOSPADM

## 2022-10-27 RX ORDER — LIDOCAINE HYDROCHLORIDE 10 MG/ML
INJECTION, SOLUTION EPIDURAL; INFILTRATION; INTRACAUDAL; PERINEURAL AS NEEDED
Status: DISCONTINUED | OUTPATIENT
Start: 2022-10-27 | End: 2022-10-27

## 2022-10-27 RX ORDER — LIDOCAINE HYDROCHLORIDE 20 MG/ML
INJECTION, SOLUTION EPIDURAL; INFILTRATION; INTRACAUDAL; PERINEURAL
Status: COMPLETED | OUTPATIENT
Start: 2022-10-27 | End: 2022-10-27

## 2022-10-27 RX ORDER — OXYCODONE HYDROCHLORIDE 5 MG/1
5 TABLET ORAL EVERY 4 HOURS PRN
Qty: 15 TABLET | Refills: 0 | Status: SHIPPED | OUTPATIENT
Start: 2022-10-27

## 2022-10-27 RX ORDER — MAGNESIUM HYDROXIDE 1200 MG/15ML
LIQUID ORAL AS NEEDED
Status: DISCONTINUED | OUTPATIENT
Start: 2022-10-27 | End: 2022-10-27 | Stop reason: HOSPADM

## 2022-10-27 RX ORDER — SODIUM CHLORIDE, SODIUM LACTATE, POTASSIUM CHLORIDE, CALCIUM CHLORIDE 600; 310; 30; 20 MG/100ML; MG/100ML; MG/100ML; MG/100ML
125 INJECTION, SOLUTION INTRAVENOUS CONTINUOUS
Status: DISCONTINUED | OUTPATIENT
Start: 2022-10-27 | End: 2022-10-27 | Stop reason: HOSPADM

## 2022-10-27 RX ADMIN — FENTANYL CITRATE 25 MCG: 50 INJECTION, SOLUTION INTRAMUSCULAR; INTRAVENOUS at 09:07

## 2022-10-27 RX ADMIN — FENTANYL CITRATE 50 MCG: 50 INJECTION, SOLUTION INTRAMUSCULAR; INTRAVENOUS at 08:03

## 2022-10-27 RX ADMIN — SODIUM CHLORIDE, SODIUM LACTATE, POTASSIUM CHLORIDE, AND CALCIUM CHLORIDE: .6; .31; .03; .02 INJECTION, SOLUTION INTRAVENOUS at 07:05

## 2022-10-27 RX ADMIN — CEFAZOLIN SODIUM 2000 MG: 2 SOLUTION INTRAVENOUS at 08:02

## 2022-10-27 RX ADMIN — ONDANSETRON 4 MG: 2 INJECTION INTRAMUSCULAR; INTRAVENOUS at 08:15

## 2022-10-27 RX ADMIN — LIDOCAINE HYDROCHLORIDE 50 MG: 10 INJECTION, SOLUTION EPIDURAL; INFILTRATION; INTRACAUDAL; PERINEURAL at 08:03

## 2022-10-27 RX ADMIN — LIDOCAINE HYDROCHLORIDE 2 ML: 20 INJECTION, SOLUTION EPIDURAL; INFILTRATION; INTRACAUDAL; PERINEURAL at 07:48

## 2022-10-27 RX ADMIN — FENTANYL CITRATE 25 MCG: 50 INJECTION, SOLUTION INTRAMUSCULAR; INTRAVENOUS at 08:41

## 2022-10-27 RX ADMIN — PROPOFOL 200 MG: 10 INJECTION, EMULSION INTRAVENOUS at 08:03

## 2022-10-27 RX ADMIN — ROPIVACAINE HYDROCHLORIDE 20 ML: 5 INJECTION, SOLUTION EPIDURAL; INFILTRATION; PERINEURAL at 07:48

## 2022-10-27 RX ADMIN — MIDAZOLAM 2 MG: 1 INJECTION INTRAMUSCULAR; INTRAVENOUS at 07:47

## 2022-10-27 NOTE — ANESTHESIA PROCEDURE NOTES
Peripheral Block    Patient location during procedure: pre-op  Start time: 10/27/2022 7:45 AM  Reason for block: at surgeon's request and post-op pain management  Staffing  Performed: Anesthesiologist   Anesthesiologist: Marcelina Oleary MD  Preanesthetic Checklist  Completed: patient identified, IV checked, site marked, risks and benefits discussed, surgical consent, monitors and equipment checked, pre-op evaluation and timeout performed  Peripheral Block  Patient position: supine  Prep: ChloraPrep  Patient monitoring: heart rate, continuous pulse ox and frequent blood pressure checks  Block type: supraclavicular  Laterality: right  Injection technique: single-shot  Procedures: ultrasound guided, Ultrasound guidance required for the procedure to increase accuracy and safety of medication placement and decrease risk of complications    Ultrasound permanent image savedropivacaine (NAROPIN) 0 5 % - Perineural   20 mL - 10/27/2022 7:48:00 AM  lidocaine (XYLOCAINE) 2 % - Infiltration   2 mL - 10/27/2022 7:48:00 AM  Needle  Needle type: Stimuplex   Needle gauge: 22 G  Needle length: 10 cm  Needle localization: ultrasound guidance  Assessment  Injection assessment: incremental injection, local visualized surrounding nerve on ultrasound, negative aspiration for heme and no paresthesia on injection  Paresthesia pain: none  Heart rate change: no  Slow fractionated injection: yes  Post-procedure:  site cleaned  patient tolerated the procedure well with no immediate complications

## 2022-10-27 NOTE — ANESTHESIA PREPROCEDURE EVALUATION
Procedure:  ARTHROPLASTY THUMB WEILBY (Right Hand)    Relevant Problems   CARDIO   (+) Hyperlipidemia      ENDO   (+) Hypothyroidism      GI/HEPATIC   (+) Gastroesophageal reflux disease        Physical Exam    Airway    Mallampati score: II  TM Distance: >3 FB  Neck ROM: full     Dental   No notable dental hx     Cardiovascular  Cardiovascular exam normal    Pulmonary  Pulmonary exam normal     Other Findings        Anesthesia Plan  ASA Score- 2     Anesthesia Type- general with ASA Monitors  Additional Monitors:   Airway Plan: LMA  Plan Factors-Exercise tolerance (METS): >4 METS  Chart reviewed  Imaging results reviewed  Existing labs reviewed  Patient summary reviewed  Patient is not a current smoker  Patient did not smoke on day of surgery  Obstructive sleep apnea risk education given perioperatively  Induction- intravenous  Postoperative Plan- Plan for postoperative opioid use  Informed Consent- Anesthetic plan and risks discussed with patient  I personally reviewed this patient with the CRNA  Discussed and agreed on the Anesthesia Plan with the CRNA  Madelaine De Leon

## 2022-10-27 NOTE — PERIOPERATIVE NURSING NOTE
Received from [facility], alert  VSS  Offers no c/o pain at present  Right hand ace dry, intact  Pt able to move fingers

## 2022-10-27 NOTE — OP NOTE
OPERATIVE REPORT  PATIENT NAME: Macie Mcgee    :  1970  MRN: [id number]  Pt Location: [facility] OR ROOM 01    SURGERY DATE: 10/27/2022    Surgeon(s) and Role:     * Josemanuel Morrison MD - Primary     * Delta Caballero PA-C - Assisting necessary for the procedure for assistance with retraction of vital structures well as assistance with preparation of the [facility] joint for implantation of the prosthesis and assistance with implantation and tensioning of the prosthesis  Herbert RICO  [facility] 2nd assistant    Preop Diagnosis:  Arthritis of carpometacarpal [place]) joint of right thumb [M18 11] severe end-stage  Pain in thumb joint with movement of right hand [M25 541]    Post-Op Diagnosis Codes:     * Arthritis of carpometacarpal [place]) joint of right thumb [M18 11] severe end-stage     * Pain in thumb joint with movement of right hand [M25 541]    Procedure:  Right thumb carpometacarpal arthroplasty utilizing Arthrex CMC mini tight rope 1 1 mm implant with 2 buttons under fluoroscopic guidance with mini C-arm    Specimen(s):  * No specimens in log *    Estimated Blood Loss:   Minimal    Drains:  * No LDAs found *    Anesthesia Type:   General w/ Regional    Operative Indications:  Arthritis of carpometacarpal (CMC) joint of right thumb [M18 11]  Pain in thumb joint with movement of right hand Arti Valentin is a 30-year-old female who has been suffering long-term with right thumb pain as well as weakness and disability due to severe CMC arthritis verified with x-rays  She had failed non operative measures and wished to undergo a right thumb CMC arthroplasty  She understood the risks and benefits of that procedure wished to go ahead    The risks are inclusive of but not limited to infection, stiffness, nerve or blood vessel injury causing numbness pain and weakness, dysfunction of the hand, failure to achieve anticipated results, failure to regain full strength and ability, worsening of symptoms, and need for further surgery  Operative Findings:  Right hand examined under loupe magnification demonstrated severe end-stage arthritis at the [facility] joint of the thumb as well as significant degenerative change at the scaphoid trapezial joint  There was a large osteophyte noted along the more ulnar aspect of this region  We were able to remove the entirety of the trapezium and verified this with mini C-arm fluoroscopic guidance  We then achieved a nice tensioning of the [facility] mini tight rope that was appropriate with recreation of Shenton's line and good ability the hand to lay flat with a palm on the operating room table at the end of the procedure and no signs of over tensioning of the thumb  We also let the tourniquet down prior to closure to verify the no significant vessel injury had occurred  We were pleased with just minimal peripheral bleeding and no significant vessel bleeding at the end of the operation  Complications:   None    Procedure:  Ariane Villarreal was taken to the operating room and placed supine on the OR table  She was given preoperative IV antibiotics  She was given preoperative regional anesthesia by attending anesthesiologist   General anesthesia was induced in the right upper extremity was prepped and draped in usual sterile fashion  A surgical time-out was taken  Loupe magnification was utilized for the operation  We did exsanguinate and inflate the tourniquet  Total tourniquet time was 50 minutes  A longitudinal incision was made along the dorsal aspect of the thumb at the [facility] joint from the thumb metacarpal down towards the area of the scaphoid with a 15 blade through skin  We did carefully dissect past subcutaneous structures and protected the dorsal sensory branch of radial nerve as well as tendon and arterial and venous structures  We utilized bipolar cautery for any small venous cauterization necessary for exposure    We did utilized fluoroscopic guidance to directly identify the trapezium  We were then able to enter the capsule and exposed the capsule and trapezium quite readily  We then utilized a saw and did cut the trapezium in half  We were then able to remove the trapezium in piecemeal and pay special attention to removal of this large osteophyte that was found along the more ulnar aspect of the surgical field  We utilized fluoroscopic guidance to verify complete removal to resume  We found the flexor carpi radialis tendon nicely intact in the floor of the wound  We did protect neurovascular structures throughout the procedure  Once we were satisfied with removal trapezium, we were then able to place a guidewire from the radial aspect of the thumb metacarpal base across in bicortical fashion the thumb metacarpal and then in bicortical fashion through the index metacarpal at the proximal aspect of the shaft  We made a small incision over the ulnar aspect of the index metacarpal with a 15 blade and then delivered the guidewire in this region protecting the dorsal sensory branch that was present in this area  We then passed the mini tight rope and placed the button flat along the radial aspect of the base of thumb metacarpal   We then placed the button along the ulnar aspect the index metacarpal and tensioned it gently utilizing fluoroscopic guidance to make certain we did not over tension this region  To excessive knots were thrown and fluoroscopic images were taken after these knots were thrown  We did recreate Shenton's line and were able to very readily place the hand flat on the operating room table  We felt we had excellent motion of the thumb metacarpal at the end of the procedure with no signs of over constraint  We then irrigated and let the tourniquet down  We were pleased that no significant venous or arterial injuries were observed  We had a relatively dry field and the operation    We then closed the dorsal capsule after further irrigation utilizing 0 Vicryl suture and then utilized 4-0 nylon suture for closure of skin at the thumb and index incisions  Dry, sterile dressings were then applied with a thumb spica splint  She tolerated procedure well and transferred to recovery room stable condition  She will follow up in 2 weeks for suture removal   She will be on the [facility] arthroplasty of the thumb rehabilitation protocol     I was present for the entire procedure and A qualified resident physician was not available    Patient Disposition:  PACU         SIGNATURE: Sharita Ortiz MD  DATE: October 27, 2022  TIME: 9:23 AM

## 2022-10-27 NOTE — H&P
H&P Exam - Orthopedics   Vidya Caldwell 46 y o  female MRN: [id number]  Unit/Bed#: [facility] Encounter: [id number]    Assessment/Plan     Assessment:  Right thumb severe CMC arthritis  Plan:  Right thumb CMC arthroplasty    History of Present Illness   HPI:  Vidya Caldwell is a 46 y o  female who presents with severe right thumb CMC arthritis and severe pain and disability      Review of Systems    Historical Information   Past Medical History:   Diagnosis Date   • Ankle arthritis     right is having surgery soon for this   • Cancer [facility]) 2007    cervical   • Disease of thyroid gland     hypo   • GERD (gastroesophageal reflux disease)    • History of transfusion     age 3   • Hyperlipidemia    • MS (congenital mitral stenosis)     patient has multiple sclerosis    • Multiple sclerosis ([facility] [address] )      Past Surgical History:   Procedure Laterality Date   • CARPAL TUNNEL RELEASE Right    • COLONOSCOPY     • HYSTERECTOMY     • MN OSTEOTOMY HEEL BONE Right 10/20/2021    Procedure: OSTEOTOMY CALCANEUS, Medial slide calcaneal osteotomy, FDL tendon transfer to navicular, Cotton osteotomy, Achilles lengthening, Wilson osteotomy;  Surgeon: Amrita Loza MD;  Location: [facility] MAIN OR;  Service: Orthopedics   • MN REVISE MEDIAN N/CARPAL TUNNEL SURG Right 2021    Procedure: RELEASE CARPAL TUNNEL;  Surgeon: Christiana Wayne DO;  Location: [place] MAIN OR;  Service: Orthopedics   • SINUS SURGERY     • WRIST SURGERY Left      Social History   Social History     Substance and Sexual Activity   Alcohol Use Yes    Comment: 1-2 drinks once per week     Social History     Substance and Sexual Activity   Drug Use Never     Social History     Tobacco Use   Smoking Status Former Smoker   • Packs/day: 0 50   • Years: 45 00   • Pack years: 22 50   • Types: Cigarettes   • Quit date: 4/10/2020   • Years since quittin 5   Smokeless Tobacco Never Used     Family History:   Family History   Problem Relation Age of Onset   • No Known Problems Mother        Meds/Allergies   PTA meds:   Prior to Admission Medications   Prescriptions Last Dose Informant Patient Reported? Taking?   azelastine (ASTELIN) 0 1 % nasal spray 10/27/2022 at 0230 Self Yes Yes   Si sprays into each nostril daily   cyclobenzaprine (FLEXERIL) 10 mg tablet 10/26/2022 at 2200 Self Yes Yes   Sig: Take 10 mg by mouth daily at bedtime    estradiol (ESTRACE) 1 mg tablet 10/26/2022 at 2200 Self Yes Yes   Sig: Take 2 mg by mouth daily at bedtime     famotidine (PEPCID) 10 mg tablet 10/26/2022 at 1800 Self Yes Yes   Sig: Take 10 mg by mouth daily at bedtime   fexofenadine (ALLEGRA) 60 MG tablet 10/26/2022 at 2200 Self Yes Yes   Sig: Take 60 mg by mouth daily at bedtime    fluticasone (FLONASE) 50 mcg/act nasal spray 10/27/2022 at 0530 Self Yes Yes   Si mcg into each nostril daily   gabapentin (Neurontin) 300 mg capsule 10/27/2022 at 0530  No Yes   Sig: Take 1 capsule (300 mg total) by mouth 2 (two) times a day   levothyroxine 50 mcg tablet 10/27/2022 at 0530 Self Yes Yes   Sig: Take 50 mcg by mouth daily   losartan (COZAAR) 25 mg tablet 10/26/2022 at 2200  Yes Yes   Sig: Take 25 mg by mouth daily at bedtime   montelukast (SINGULAIR) 10 mg tablet 10/26/2022 at 2200 Self Yes Yes   Sig: Take 10 mg by mouth daily at bedtime    rosuvastatin (CRESTOR) 10 MG tablet 10/26/2022 at 2200 Self Yes Yes   Sig: Take 10 mg by mouth daily at bedtime       Facility-Administered Medications: None     No Known Allergies    Objective   Vitals: Blood pressure 148/73, pulse 81, temperature 97 5 °F (36 4 °C), temperature source Temporal, resp  rate 18, height 5' 9" (1 753 m), weight 106 kg (232 lb 9 6 oz), SpO2 99 %, not currently breastfeeding  ,Body mass index is 34 35 kg/m²  No intake or output data in the 24 hours ending 10/27/22 0752    No intake/output data recorded  Invasive Devices  Report    Peripheral Intravenous Line  Duration           Peripheral IV 10/27/22 Dorsal (posterior); Left Hand <1 day                Physical Exam  Vitals reviewed  Constitutional:       Appearance: She is well-developed  HENT:      Head: Normocephalic and atraumatic  Right Ear: External ear normal       Left Ear: External ear normal    Eyes:      Conjunctiva/sclera: Conjunctivae normal    Cardiovascular:      Rate and Rhythm: Normal rate  Pulmonary:      Effort: Pulmonary effort is normal  No respiratory distress  Skin:     General: Skin is warm  Capillary Refill: Capillary refill takes less than 2 seconds  Neurological:      Mental Status: She is alert and oriented to person, place, and time     Psychiatric:         Behavior: Behavior normal        Right Hand Exam     Comments:  Obvious deformity at the [facility] joint of the right thumb with severe pain to palpation and decreased strength with  strength            Lab Results: CBC: No results found for: WBC, HGB, HCT, MCV, PLT, ADJUSTEDWBC, MCH, MCHC, RDW, MPV, NRBC  Imaging: I have personally reviewed pertinent films in PACS x-rays demonstrate severe arthritis at the [facility] joint of the right thumb

## 2022-10-27 NOTE — DISCHARGE INSTRUCTIONS
INSTRUCTIONS FOLLOWING YOUR HAND/WRIST SURGERY     First follow-up visit after surgery   Call the office the day after your surgery & make an appointment for 10-14 days after surgery to see Dr Murray Islas  At that appointment, your sutures will be removed  Dressing & Wound Care   If you are in a splint, keep the original dressing on and dry until you are seen by Dr Murray Islas  You may take a bath by covering your arm in a garbage bag or other waterproof bag  Tie it securely with rubber bands and duct tape  Keep your dressing as clean as possible     If you are only in a soft dressing, you may take the dressing off at 3 days after your operation; at which point you may shower and get the incision wet  Do not soak the wound in dish, bath, or pool water until after the stitches have been removed     Swelling and Discoloration   You will notice some swelling of your hand - this is normal  Elevate your hand above the level of your heart as much as possible for the first week  When you sleep, place your hand on several pillows  You may notice a bluish discoloration of your fingers  This is also normal  This discoloration may change from blue to purple to green to yellow, then will slowly go away over a few weeks time  Discomfort   You will experience some pain and discomfort after surgery  By the third day, this pain usually decreases significantly  You will be given a prescription for pain medication  Take the medication as prescribed  If the discomfort is mild, you can take 1 or 2 Tylenol every 4 - 6 hours as needed, instead of the prescribed pain medication  Temperature   A temperature of up to 101  5ºF is common for the first 2 days after surgery  If your temperature is elevated above 101 5º F, call the office  Exercise   Unless instructed otherwise, you may gently open and close your fingers  Do not perform any exercises that cause you to sweat   Sweating may increase complications with your incision  We hope this answers many of your questions regarding your surgery  These are only guidelines however  If you have any other concerns or questions at any time, do not hesitate to call the office [phone]

## 2022-11-07 ENCOUNTER — OFFICE VISIT (OUTPATIENT)
Dept: OBGYN CLINIC | Facility: CLINIC | Age: 52
End: 2022-11-07

## 2022-11-07 DIAGNOSIS — Z98.890 HISTORY OF HAND SURGERY: Primary | ICD-10-CM

## 2022-11-07 DIAGNOSIS — M18.11 PRIMARY OSTEOARTHRITIS OF FIRST CARPOMETACARPAL JOINT OF RIGHT HAND: ICD-10-CM

## 2022-11-07 NOTE — PROGRESS NOTES
Assessment/Plan:  1  Status post right thumb [facility] arthroplasty  Durable Medical Equipment   2  Primary osteoarthritis of first carpometacarpal joint of right hand  Durable Medical Equipment     Scribe Attestation    I,:  Alalberto Ankit am acting as a scribe while in the presence of the attending physician :       I,:  Timo Alonso MD personally performed the services described in this documentation    as scribed in my presence :           Vanessa Brunson upon examination is doing well now approximately 2 weeks status post right carpometacarpal joint arthroplasty of the thumb  Her sutures were removed today and she was redressed with Steri-Strips  She is to allow the Steri-Strips to fall off on their own over the next week  She will be fitted with a cock-up wrist splint with thumb spica today  She may remove this for when she is at rest as well as at hand therapy  She was also advised to continue to abstain from any soaking of the hand for an additional 2 weeks for infection medication  She is also to refrain from any heavy weight-bearing of the right hand over the next 6 weeks  She will follow with me in 6 weeks' time for repeat clinical evaluation  At that time if she continues to demonstrate clinical improvements with her right hand we will discuss a carpometacarpal joint arthroplasty of the left thumb at that visit  Subjective:   Radha Rocha is a 46 y o  female who presents for follow up evaluation of the right thumb  She is 11 days status post [facility] arthroplasty of the right thumb  She states that she is doing well  She notes that the pain is well controled and can be exacerbated with movement of the thumb and pinching  She denies any distal paresthesias  Overall, she is happy her progress and states that her right hand is less painful than her left  She does wish to consider surgical intervention in the left hand in the near future        Review of Systems   Constitutional: Negative for chills, fever and unexpected weight change  HENT: Negative for hearing loss, nosebleeds and sore throat  Eyes: Negative for pain, redness and visual disturbance  Respiratory: Negative for cough, shortness of breath and wheezing  Cardiovascular: Negative for chest pain, palpitations and leg swelling  Gastrointestinal: Negative for abdominal pain, nausea and vomiting  Endocrine: Negative for polydipsia and polyuria  Genitourinary: Negative for dysuria and hematuria  Musculoskeletal: Positive for arthralgias and myalgias  See HPI   Skin: Negative for rash and wound  Neurological: Negative for dizziness, numbness and headaches  Psychiatric/Behavioral: Negative for decreased concentration and suicidal ideas  The patient is not nervous/anxious            Past Medical History:   Diagnosis Date   • Ankle arthritis     right is having surgery soon for this   • Cancer [facility]) 2007    cervical   • Disease of thyroid gland     hypo   • GERD (gastroesophageal reflux disease)    • History of transfusion     age 3   • Hyperlipidemia    • MS (congenital mitral stenosis)     patient has multiple sclerosis    • Multiple sclerosis ([address] [address] )        Past Surgical History:   Procedure Laterality Date   • CARPAL TUNNEL RELEASE Right    • COLONOSCOPY     • HYSTERECTOMY     • UT OSTEOTOMY HEEL BONE Right 10/20/2021    Procedure: OSTEOTOMY CALCANEUS, Medial slide calcaneal osteotomy, FDL tendon transfer to navicular, Cotton osteotomy, Achilles lengthening, Wilson osteotomy;  Surgeon: Za Howe MD;  [facility] MAIN OR;  Service: Orthopedics   • UT REPAIR INTERCARP/CARP-METACARP JT Right 10/27/2022    Procedure: ARTHROPLASTY THUMB Billee Done; CARPOMETACARPAL ARTHROPLASTY RIGHT THUMB;  Surgeon: Aguila Alberts MD;  Location: [address] [place] [address];  Service: Orthopedics   • UT REVISE MEDIAN N/CARPAL TUNNEL SURG Right 9/17/2021    Procedure: RELEASE CARPAL TUNNEL;  Surgeon: Ayad Glasgow DO;  Location: [address] [place] [address]; Service: Orthopedics   • SINUS SURGERY     • WRIST SURGERY Left        Family History   Problem Relation Age of Onset   • No Known Problems Mother        Social History     Occupational History   • Not on file   Tobacco Use   • Smoking status: Former Smoker     Packs/day: 0 50     Years: 45 00     Pack years: 22 50     Types: Cigarettes     Quit date: 4/10/2020     Years since quittin 5   • Smokeless tobacco: Never Used   Vaping Use   • Vaping Use: Never used   Substance and Sexual Activity   • Alcohol use: Yes     Comment: 1-2 drinks once per week   • Drug use: Never   • Sexual activity: Yes         Current Outpatient Medications:   •  azelastine (ASTELIN) 0 1 % nasal spray, 2 sprays into each nostril daily, Disp: , Rfl:   •  cyclobenzaprine (FLEXERIL) 10 mg tablet, Take 10 mg by mouth daily at bedtime , Disp: , Rfl:   •  estradiol (ESTRACE) 1 mg tablet, Take 2 mg by mouth daily at bedtime  , Disp: , Rfl:   •  famotidine (PEPCID) 10 mg tablet, Take 10 mg by mouth daily at bedtime, Disp: , Rfl:   •  fexofenadine (ALLEGRA) 60 MG tablet, Take 60 mg by mouth daily at bedtime , Disp: , Rfl:   •  fluticasone (FLONASE) 50 mcg/act nasal spray, 50 mcg into each nostril daily, Disp: , Rfl:   •  gabapentin (Neurontin) 300 mg capsule, Take 1 capsule (300 mg total) by mouth 2 (two) times a day, Disp: 60 capsule, Rfl: 3  •  levothyroxine 50 mcg tablet, Take 50 mcg by mouth daily, Disp: , Rfl:   •  losartan (COZAAR) 25 mg tablet, Take 25 mg by mouth daily at bedtime, Disp: , Rfl:   •  montelukast (SINGULAIR) 10 mg tablet, Take 10 mg by mouth daily at bedtime , Disp: , Rfl:   •  oxyCODONE (Roxicodone) 5 immediate release tablet, Take 1 tablet (5 mg total) by mouth every 4 (four) hours as needed for moderate pain for up to 15 doses Max Daily Amount: 30 mg, Disp: 15 tablet, Rfl: 0  •  rosuvastatin (CRESTOR) 10 MG tablet, Take 10 mg by mouth daily at bedtime , Disp: , Rfl:     No Known Allergies    Objective:   There were no vitals filed for this visit  Right Hand Exam     Tenderness   Right hand tenderness location: mild tenderness at the [facility] joint  Other   Erythema: absent  Scars: present (scars at the radial aspect of the wrist and dorsally are CDI )  Sensation: normal  Pulse: present            Physical Exam  Vitals reviewed  HENT:      Head: Normocephalic and atraumatic  Eyes:      General:         Right eye: No discharge  Left eye: No discharge  Conjunctiva/sclera: Conjunctivae normal       Pupils: Pupils are equal, round, and reactive to light  Cardiovascular:      Rate and Rhythm: Normal rate  Pulmonary:      Effort: Pulmonary effort is normal  No respiratory distress  Musculoskeletal:      Cervical back: Normal range of motion and neck supple  Comments: As noted in HPI   Skin:     General: Skin is warm and dry  Neurological:      Mental Status: She is alert and oriented to person, place, and time  I have personally reviewed pertinent films in PACS and my interpretation is as follows: No images reviewed today      This document was created using speech voice recognition software  Grammatical errors, random word insertions, pronoun errors, and incomplete sentences are an occasional consequence of this system due to software limitations, ambient noise, and hardware issues  Any formal questions or concerns about content, text, or information contained within the body of this dictation should be directly addressed to the provider for clarification

## 2022-11-10 ENCOUNTER — APPOINTMENT (OUTPATIENT)
Dept: OCCUPATIONAL THERAPY | Facility: CLINIC | Age: 52
End: 2022-11-10

## 2022-11-17 ENCOUNTER — EVALUATION (OUTPATIENT)
Dept: OCCUPATIONAL THERAPY | Facility: CLINIC | Age: 52
End: 2022-11-17

## 2022-11-17 DIAGNOSIS — M18.11 ARTHRITIS OF CARPOMETACARPAL (CMC) JOINT OF RIGHT THUMB: ICD-10-CM

## 2022-11-17 NOTE — PROGRESS NOTES
OT Evaluation     Today's date: 2022  Patient name: Gabriela Tsang  : 1970  MRN: [id number]  Referring provider: Chang Morrell*  Dx:   Encounter Diagnosis     ICD-10-CM    1  Arthritis of carpometacarpal [place]) joint of right thumb  M18 11 Ambulatory Referral to PT/OT Hand Therapy          Start Time:   Stop Time:   Total time in clinic (min): 45 minutes    Assessment  Assessment details: Patient is a 46 y o  RHD who presents to OT IE s/p R CMC arthroplasty due to OA of the thumb  Conservative treatment attempted with steroid injections (most recent on 2022) however pain and symptoms persisted therefore patient elected for surgery  Patient's surgery was performed by Dr Marco A Cooper on 10/27/2022 and splint was removed on 2022  Patient to initiate skilled OT hand therapy by referring physician to improve functional performance in daily activities  Impairments: activity intolerance and lacks appropriate home exercise program  Understanding of Dx/Px/POC: good   Prognosis: good    Goals  Short term goals: 2-4 weeks    Patient will demonstrate understanding of HEP  Patient will increase ROM by at least 15 degrees to perform composite motor movements in household chores  Long term goals: By discharge    Patient will implement home exercise program to fully return to functional ADLs/IADLs  Patient will achieve functional ROM of RUE to engage in daily grooming activities  Patient will demonstrate functional  and pinch strength of RUE to participate in leisure activities            Plan  Patient would benefit from: OT eval and skilled occupational therapy  Planned modality interventions: TENS and thermotherapy: hydrocollator packs  Planned therapy interventions: graded activity, graded exercise, graded motor, manual therapy, patient education, therapeutic activities, therapeutic exercise and home exercise program  Frequency: 2x week  Duration in weeks: 12  Plan of Care beginning date: 2022  Plan of Care expiration date: 2023  Treatment plan discussed with: patient        Subjective Evaluation    History of Present Illness  Date of surgery: 10/27/2022  Mechanism of injury: Patient is a 46 y o  RHD who presents to OT IE s/p R CMC arthroplasty due to OA of the thumb  Conservative treatment attempted with steroid injections (most recent on 2022) however pain and symptoms persisted therefore patient elected for surgery  Patient's surgery was performed by Dr Josh Spangler on 10/27/2022 and splint was removed on 2022  Patient to initiate skilled OT hand therapy by referring physician to improve functional performance in daily activities  Pain  Current pain ratin  At best pain ratin  At worst pain ratin  Quality: dull ache    Hand dominance: right    Patient Goals  Patient goals for therapy: independence with ADLs/IADLs, return to sport/leisure activities and increased motion          Objective     Active Range of Motion     Left Wrist   Wrist flexion: 62 degrees   Wrist extension: 75 degrees   Radial deviation: 17 degrees   Ulnar deviation: 46 degrees     Right Wrist   Wrist flexion: 62 degrees   Wrist extension: 69 degrees   Radial deviation: 13 degrees   Ulnar deviation: 40 degrees     Left Thumb   Flexion     CMC: 12 degrees    MP: -22 degrees    DIP: -24 degrees  Extension     CMC: 32 degrees    MP: 10 degrees    DIP: 54 degrees  Palmar Abduction     CMC: 40 degrees  Radial abduction    CMC: 54 degrees    Right Thumb   Flexion     CMC: 9    MP: -20    DIP: -15  Extension     CMC: 32    MP: 33    DIP: 52  Palmar Abduction    CMC: 36  Radial Abduction    CMC: 31  Opposition: Full opposition and functional closed fist noted              Auth Tracker AUTH req  Auth Status Total   Visits  Expiration date Co-Insurance   Pending                                  Visit Tracker  Date  IE Unit Tracker  Date               Used               Remaining                      PMHx:   has a past medical history of Ankle arthritis, Cancer ([facility] ) ([facility]), Disease of thyroid gland, GERD (gastroesophageal reflux disease), History of transfusion, Hyperlipidemia, MS (congenital mitral stenosis), and Multiple sclerosis ([facility] )  Precautions:        Manuals HEP                        Ther Ex     Education on HEP and dx x5 min     Towel scrunch w/ ext x10 3 sec hold    AROM wrist flex/ext/RD/UD x10    AROM thumb, all planes x10    AROM thumb opposition w/ slides x10    AROM forearm rotation (sup/pro) x10    PROM wrist flex/ext x3 10 sec hold                                  Modalities     MHP 5 min            Assessment:   Patient tolerated session well  Patient demonstrates mild deficits upon assessment today as demonstrated in measurements taken for the wrist and thumb/hand  Patient session focused on patient education on anatomy and physiology concerning current dx, techniques for decreasing deficits through HEP, and appropriate use of modalities  Patient educated on HEP to include ROM with verbal instructions and handouts for patient reference  Patient educated on treatment plan at this time  Patient benefiting from skilled hand therapy OT to reduce deficits to improve independence with daily activities  Plan:   Focus on ROM to improve ability to complete daily activites with ease    POC 11/17/2022 - 2/8/2023

## 2022-11-22 ENCOUNTER — OFFICE VISIT (OUTPATIENT)
Dept: OCCUPATIONAL THERAPY | Facility: CLINIC | Age: 52
End: 2022-11-22

## 2022-11-22 DIAGNOSIS — M18.11 ARTHRITIS OF CARPOMETACARPAL (CMC) JOINT OF RIGHT THUMB: Primary | ICD-10-CM

## 2022-11-22 NOTE — PROGRESS NOTES
Daily Note     Today's date: 2022  Patient name: Isis Manzo  : 1970  MRN: [id number]  Referring provider: Le Weldon*  Dx:   Encounter Diagnosis     ICD-10-CM    1  Arthritis of carpometacarpal (CMC) joint of right thumb  M18 11                      Subjective: Patient notes she has been compliant with HEP  Patient notes the hand feels a little better each day  No complaints at this time  Objective: See treatment diary below         Raul Hidalgo 35 req  Auth Status Total   Visits  Expiration date Co-Insurance   approved 23                                Visit Tracker  Date  IE                                                                                      PMHx:   has a past medical history of Ankle arthritis, Cancer ([facility] [address] ) (), Disease of thyroid gland, GERD (gastroesophageal reflux disease), History of transfusion, Hyperlipidemia, MS (congenital mitral stenosis), and Multiple sclerosis ([facility] [address] )  Precautions: Sx 10/27/22       Manuals HEP 2022                        Ther Ex     Education on HEP and dx x5 min  x5min    Towel scrunch w/ ext x10 3 sec hold x10 3 sec hold   AROM wrist flex/ext/RD/UD x10 x10   AROM thumb, all planes x10 x10   AROM thumb opposition w/ slides x10 x10   AROM forearm rotation (sup/pro) x10 x10   PROM wrist flex/ext x3 10 sec hold x3 10 sec hold    STM  x5 min                   There act     Towel scrunch   x5   juxtaciser  x10   Wrist rotator  x10   Dice   x25   Iron balls  x3 min              Modalities     MHP 5 min  5 min           Assessment:   Patient tolerated session well  Session focused on HEP education, range of motion, and patient education to improve functional task performance with daily activities  Patient tolerated all TE/TA with no complaints  Patient progressing well towards goals  Patient benefiting from skilled hand therapy OT to reduce deficits to improve independence with daily activities  Plan:   Focus on ROM to improve ability to complete daily activites with ease    POC 11/17/2022 - 2/8/2023

## 2022-11-29 ENCOUNTER — OFFICE VISIT (OUTPATIENT)
Dept: OCCUPATIONAL THERAPY | Facility: CLINIC | Age: 52
End: 2022-11-29

## 2022-11-29 DIAGNOSIS — M18.11 ARTHRITIS OF CARPOMETACARPAL (CMC) JOINT OF RIGHT THUMB: Primary | ICD-10-CM

## 2022-11-29 NOTE — PROGRESS NOTES
Daily Note     Today's date: 2022  Patient name: Wan Sanchez  : 1970  MRN: [id number]  Referring provider: Taisha Montenegro*  Dx:   Encounter Diagnosis     ICD-10-CM    1  Arthritis of carpometacarpal (CMC) joint of right thumb  M18 11           Start Time: 4694  Stop Time: 1500  Total time in clinic (min): 45 minutes    Subjective: Patient reports following HEP to help with increased functional performance for daily activities  Objective: See treatment diary below     Raul Hidalgo 35 req  Auth Status Total   Visits  Expiration date Co-Insurance   approved 23                                Visit Tracker  Date  IE                                                                                     PMHx:   has a past medical history of Ankle arthritis, Cancer ([facility] [address] ) (), Disease of thyroid gland, GERD (gastroesophageal reflux disease), History of transfusion, Hyperlipidemia, MS (congenital mitral stenosis), and Multiple sclerosis ([facility] [address] )  Precautions: Sx 10/27/22       Manuals HEP 2022                        Ther Ex     Education on HEP and dx x5 min  x5min    Towel scrunch w/ ext x10 3 sec hold x10 3 sec hold   AROM wrist flex/ext/RD/UD x10 x10   AROM thumb, all planes x10 x10   AROM thumb opposition w/ slides x10 x10   AROM forearm rotation (sup/pro) x10 x10   PROM wrist flex/ext x3 10 sec hold x3 10 sec hold    STM  x5 min                   There act     Towel scrunch   x5   Juxtaciser  x10   Wrist rotator  x10   Dice   x25   Iron balls  x3 min              Modalities     MHP 5 min  5 min           Assessment:   Patient tolerated session well  Session focused on HEP, ROM, and patient education to improve functional task performance with daily activities  Patient progressing well towards with continued tolerance for all TE/TA with no complaints   Patient benefiting from skilled hand therapy OT to reduce deficits to improve independence with daily activities  Plan:   Focus on ROM to improve ability to complete daily activites with ease    POC 11/17/2022 - 2/8/2023

## 2022-12-01 ENCOUNTER — OFFICE VISIT (OUTPATIENT)
Dept: OCCUPATIONAL THERAPY | Facility: CLINIC | Age: 52
End: 2022-12-01

## 2022-12-01 DIAGNOSIS — M18.11 ARTHRITIS OF CARPOMETACARPAL (CMC) JOINT OF RIGHT THUMB: Primary | ICD-10-CM

## 2022-12-01 NOTE — PROGRESS NOTES
Daily Note     Today's date: 2022  Patient name: Wan Sanchez  : 1970  MRN: [id number]  Referring provider: Taisha Montenegro*  Dx:   Encounter Diagnosis     ICD-10-CM    1  Arthritis of carpometacarpal (CMC) joint of right thumb  M18 11         Start Time: 5439  Stop Time: 1500  Total time in clinic (min): 45 minutes    Subjective: Patient reports no functional change at this visit  Objective: See treatment diary below     Raul Rosas req  Auth Status Total   Visits  Expiration date Co-Insurance   approved 23                                Visit Tracker  Date  IE                                                                                    PMHx:   has a past medical history of Ankle arthritis, Cancer ([facility] [address] ) (), Disease of thyroid gland, GERD (gastroesophageal reflux disease), History of transfusion, Hyperlipidemia, MS (congenital mitral stenosis), and Multiple sclerosis ([facility] [address] )  Precautions: Sx 10/27/22     Manuals HEP 2022                        Ther Ex     Education on HEP and dx x5 min  x5min    Towel scrunch w/ ext x10 3 sec hold x10 3 sec hold   AROM wrist flex/ext/RD/UD x10 x10   AROM thumb, all planes x10 x10   AROM thumb opposition w/ slides x10 x10   AROM forearm rotation (sup/pro) x10 x10   PROM wrist flex/ext x3 10 sec hold x3 10 sec hold    STM  x5 min                   There act     Towel scrunch   x5   Juxtaciser  x10   Wrist rotator  x10   Dice   x25   Iron balls  x3 min              Modalities     MHP 5 min  5 min           Assessment:   Patient tolerated session well  Session focused on HEP, ROM, and patient education to improve functional task performance with daily activities  Patient progressing well towards with continued tolerance for all TE/TA with no complaints  No new exercises or upgrades implemented at this visit   Patient benefiting from skilled hand therapy OT to reduce deficits to improve independence with daily activities  Plan:   Focus on ROM to improve ability to complete daily activites with ease    POC 11/17/2022 - 2/8/2023

## 2022-12-06 ENCOUNTER — OFFICE VISIT (OUTPATIENT)
Dept: OCCUPATIONAL THERAPY | Facility: CLINIC | Age: 52
End: 2022-12-06

## 2022-12-06 DIAGNOSIS — M18.11 ARTHRITIS OF CARPOMETACARPAL (CMC) JOINT OF RIGHT THUMB: Primary | ICD-10-CM

## 2022-12-06 NOTE — PROGRESS NOTES
Daily Note     Today's date: 2022  Patient name: Gabriela Tsang  : 1970  MRN: [id number]  Referring provider: Chang Morrell*  Dx:   Encounter Diagnosis     ICD-10-CM    1  Arthritis of carpometacarpal (CMC) joint of right thumb  M18 11                    Subjective: Patient reports increased engagement in daily activities and following HEP for improved functional performance in leisure activities  Objective: See treatment diary below     Raul Hidalgo 35 req  Auth Status Total   Visits  Expiration date Co-Insurance   approved 23                                Visit Tracker  Date  IE                                                                                   PMHx:   has a past medical history of Ankle arthritis, Cancer ([facility] [address] ) (), Disease of thyroid gland, GERD (gastroesophageal reflux disease), History of transfusion, Hyperlipidemia, MS (congenital mitral stenosis), and Multiple sclerosis ([facility] [address] )  Precautions: Sx 10/27/22     Manuals HEP 2022                        Ther Ex     Education on HEP and dx x5 min  x5min    Towel scrunch w/ ext x10 3 sec hold x10 3 sec hold   AROM wrist flex/ext/RD/UD x10 x10   AROM thumb, all planes x10 x10   AROM thumb opposition w/ slides x10 x10   AROM forearm rotation (sup/pro) x10 x10   PROM wrist flex/ext x3 10 sec hold x3 10 sec hold    STM  x5 min    Digit ext band  x10 yellow             Ther act     Towel scrunch   x5   Juxtaciser  x10   Wrist rotator  x10   Dice   x25   Iron balls  x3 min    Sponge squeeze  x10 blue        Modalities     MHP 5 min  5 min           Assessment:   Patient tolerated session well  Session focused on HEP, ROM, and patient education to improve functional task performance with daily activities  Patient progressing well towards with continued tolerance for all TE/TA with no complaints  New exercises  implemented at this visit without pain   Patient benefiting from skilled hand therapy OT to reduce deficits to improve independence with daily activities  Plan:   Focus on ROM to improve ability to complete daily activites with ease    POC 11/17/2022 - 2/8/2023

## 2022-12-08 ENCOUNTER — OFFICE VISIT (OUTPATIENT)
Dept: OCCUPATIONAL THERAPY | Facility: CLINIC | Age: 52
End: 2022-12-08

## 2022-12-08 DIAGNOSIS — M18.11 ARTHRITIS OF CARPOMETACARPAL (CMC) JOINT OF RIGHT THUMB: Primary | ICD-10-CM

## 2022-12-13 ENCOUNTER — OFFICE VISIT (OUTPATIENT)
Dept: OCCUPATIONAL THERAPY | Facility: CLINIC | Age: 52
End: 2022-12-13

## 2022-12-13 DIAGNOSIS — M18.11 ARTHRITIS OF CARPOMETACARPAL (CMC) JOINT OF RIGHT THUMB: Primary | ICD-10-CM

## 2022-12-13 NOTE — PROGRESS NOTES
Daily Note     Today's date: 2022  Patient name: Sandra Clement  : 1970  MRN: [id number]  Referring provider: Alicia Williamson*  Dx:   Encounter Diagnosis     ICD-10-CM    1  Arthritis of carpometacarpal (CMC) joint of right thumb  M18 11         Start Time: 5346  Stop Time: 1500  Total time in clinic (min): 45 minutes    Subjective: Patient reports no functional change  Objective: See treatment diary below     Raul Rosas req  Auth Status Total   Visits  Expiration date Co-Insurance   approved 23                              Visit Tracker  Date  IE                                                                          PMHx:   has a past medical history of Ankle arthritis, Cancer ([facility] [address] ) (), Disease of thyroid gland, GERD (gastroesophageal reflux disease), History of transfusion, Hyperlipidemia, MS (congenital mitral stenosis), and Multiple sclerosis ([facility] [address] )  Precautions: Sx 10/27/22     Manuals HEP 2022                        Ther Ex     Education on HEP and dx x5 min  x5min    Towel scrunch w/ ext x10 3 sec hold x10 3 sec hold   AROM wrist flex/ext/RD/UD x10 x10   AROM thumb, all planes x10 x10   AROM thumb opposition w/ slides x10 x10   AROM forearm rotation (sup/pro) x10 x10   PROM wrist flex/ext x3 10 sec hold x3 10 sec hold    STM  x5 min    Digit ext band  x10 yellow   Flexbar (all planes)  x10 red   TheraPutty (/pinch)  x10 yellow             Ther act     Towel scrunch   x5   Juxtaciser  x10   Wrist rotator  x10   Dice, opposition  x25   Dice, pincher  x25 green   Dice, in-hand manipulation  x25   Iron balls  x3 min    Sponge squeeze  x10 blue   Gripper  x10 level 3   PREs wrist flex/ext/RD  x10 3 lb        Modalities     MHP 5 min  5 min           Assessment:   Patient tolerated session well  Session focused on HEP, ROM, and patient education to improve functional task performance with daily activities   Patient progressing well towards with continued tolerance for all TE/TA with no complaints  New exercises added to this visit without reports of pain  Patient benefiting from skilled hand therapy OT to reduce deficits to improve independence with daily activities  Plan:   Focus on ROM to improve ability to complete daily activites with ease    POC 11/17/2022 - 2/8/2023

## 2022-12-19 ENCOUNTER — OFFICE VISIT (OUTPATIENT)
Dept: OCCUPATIONAL THERAPY | Facility: CLINIC | Age: 52
End: 2022-12-19

## 2022-12-19 ENCOUNTER — OFFICE VISIT (OUTPATIENT)
Dept: OBGYN CLINIC | Facility: CLINIC | Age: 52
End: 2022-12-19

## 2022-12-19 DIAGNOSIS — Z98.890 HISTORY OF HAND SURGERY: ICD-10-CM

## 2022-12-19 DIAGNOSIS — M18.11 ARTHRITIS OF CARPOMETACARPAL (CMC) JOINT OF RIGHT THUMB: Primary | ICD-10-CM

## 2022-12-19 DIAGNOSIS — M18.12 OSTEOARTHRITIS OF CARPOMETACARPAL (CMC) JOINT OF LEFT THUMB, UNSPECIFIED OSTEOARTHRITIS TYPE: Primary | ICD-10-CM

## 2022-12-19 DIAGNOSIS — M19.079 ARTHRITIS OF JOINT OF TOE: ICD-10-CM

## 2022-12-19 NOTE — PROGRESS NOTES
Assessment/Plan:  1  Osteoarthritis of carpometacarpal (CMC) joint of left thumb, unspecified osteoarthritis type  Case request operating room: Thumb Carpometacarpal Arthroplasty    Case request operating room: Thumb Carpometacarpal Arthroplasty      2  Status post right thumb CMC arthroplasty        3  Arthritis of joint of toe  Ambulatory Referral to Podiatry        Scribe Attestation    I,:  Laura Blossom am acting as a scribe while in the presence of the attending physician :       I,:  Bora Beckwith MD personally performed the services described in this documentation    as scribed in my presence :         Danny Miles on examination and review of the therapy notes for her right thumb is doing well now approximately 8 weeks that is post thumb carpometacarpal joint arthroplasty  Her wound is healed quite well and does demonstrate functional strength and range of motion of the thumb on exam today  She may continue with home exercises in regards to her right thumb  She may follow-up with me on an as-needed basis in regards to her right thumb  Danny Miles upon examination of the review of the x-rays of the left hand does demonstrate severe metacarpal joint osteoarthritis of the thumb  As she did have success with her right hand she would like to consider a carpometacarpal joint arthroplasty of the left thumb today  I did discuss procedure with her at length as well as the risks including but not limited to bleeding, infection, nerve injury resulting weakness, numbness, pain, stiffness, fracture worsening of symptoms, failure of surgery and need for further surgery  She verbalized understanding and was amenable to undergoing surgical intervention for her left thumb  She did provide signed consent for left abdominal carpometacarpal joint arthroplasty  She does not require preoperative labs or EKG  She will my surgical scheduler set a date and time to have the surgery completed    I will see her back on the date of surgery  She does also have complaints of osteoarthritis like symptoms into the second toe on the right foot  She does have a history of significant surgical intervention to the foot and ankle before by Dr Maira Gonzalez  She does wish to consider seeing another provider for consultation and orthotics  I did provide her with a referral to Dr Steven Serrano podiatry for this consultation  Subjective:   Corky Sanchez is a 46 y o  female who presents for follow-up evaluation of her right thumb  She is now approximately 8 weeks as opposed to right thumb carpometacarpal joint arthroplasty  He states that she is doing very well and is experiencing minimal pain  She states that therapy has been beneficial in restoring her function of her thumb  She denies any distal paresthesias  She states that she is still working on strength  However he is quite happy with her progress at this point  She would like to discuss surgical intervention in the form of a left thumb carpometacarpal joint arthroplasty  Review of Systems   Constitutional: Negative for chills, fever and unexpected weight change  HENT: Negative for hearing loss, nosebleeds and sore throat  Eyes: Negative for pain, redness and visual disturbance  Respiratory: Negative for cough, shortness of breath and wheezing  Cardiovascular: Negative for chest pain, palpitations and leg swelling  Gastrointestinal: Negative for abdominal pain, nausea and vomiting  Endocrine: Negative for polydipsia and polyuria  Genitourinary: Negative for dysuria and hematuria  Musculoskeletal:        See HPI   Skin: Negative for rash and wound  Neurological: Negative for dizziness, numbness and headaches  Psychiatric/Behavioral: Negative for decreased concentration and suicidal ideas  The patient is not nervous/anxious            Past Medical History:   Diagnosis Date   • Ankle arthritis     right is having surgery soon for this   • Cancer [facility]) 2007 cervical   • Disease of thyroid gland     hypo   • GERD (gastroesophageal reflux disease)    • History of transfusion     age 3   • Hyperlipidemia    • MS (congenital mitral stenosis)     patient has multiple sclerosis    • Multiple sclerosis ([address] )        Past Surgical History:   Procedure Laterality Date   • CARPAL TUNNEL RELEASE Right    • COLONOSCOPY     • HYSTERECTOMY     • CA OSTEOTOMY HEEL BONE Right 10/20/2021    Procedure: OSTEOTOMY CALCANEUS, Medial slide calcaneal osteotomy, FDL tendon transfer to navicular, Cotton osteotomy, Achilles lengthening, Wilson osteotomy;  Surgeon: Leopold Mulligan, MD;  Location: [facility] MAIN OR;  Service: Orthopedics   • CA REPAIR INTERCARP/CARP-METACARP JT Right 10/27/2022    Procedure: ARTHROPLASTY THUMB Sra[employer]hi [employer]; CARPOMETACARPAL ARTHROPLASTY RIGHT THUMB;  Surgeon: Judit Goodwin MD;  Location: [facility] MAIN OR;  Service: Orthopedics   • CA REVISE MEDIAN N/CARPAL TUNNEL SURG Right 2021    Procedure: RELEASE CARPAL TUNNEL;  Surgeon: Vinicio Mcnair DO;  Location: [facility] MAIN OR;  Service: Orthopedics   • SINUS SURGERY     • WRIST SURGERY Left        Family History   Problem Relation Age of Onset   • No Known Problems Mother        Social History     Occupational History   • Not on file   Tobacco Use   • Smoking status: Former     Packs/day: 0 50     Years: 45 00     Pack years: 22 50     Types: Cigarettes     Quit date: 4/10/2020     Years since quittin 6   • Smokeless tobacco: Never   Vaping Use   • Vaping Use: Never used   Substance and Sexual Activity   • Alcohol use: Yes     Comment: 1-2 drinks once per week   • Drug use: Never   • Sexual activity: Yes         Current Outpatient Medications:   •  azelastine (ASTELIN) 0 1 % nasal spray, 2 sprays into each nostril daily, Disp: , Rfl:   •  cyclobenzaprine (FLEXERIL) 10 mg tablet, Take 10 mg by mouth daily at bedtime , Disp: , Rfl:   •  estradiol (ESTRACE) 1 mg tablet, Take 2 mg by mouth daily at bedtime  , Disp: , Rfl:   •  famotidine (PEPCID) 10 mg tablet, Take 10 mg by mouth daily at bedtime, Disp: , Rfl:   •  fexofenadine (ALLEGRA) 60 MG tablet, Take 60 mg by mouth daily at bedtime , Disp: , Rfl:   •  gabapentin (Neurontin) 300 mg capsule, Take 1 capsule (300 mg total) by mouth 2 (two) times a day, Disp: 60 capsule, Rfl: 3  •  levothyroxine 50 mcg tablet, Take 50 mcg by mouth daily, Disp: , Rfl:   •  losartan (COZAAR) 25 mg tablet, Take 25 mg by mouth daily at bedtime, Disp: , Rfl:   •  montelukast (SINGULAIR) 10 mg tablet, Take 10 mg by mouth daily at bedtime , Disp: , Rfl:   •  rosuvastatin (CRESTOR) 10 MG tablet, Take 10 mg by mouth daily at bedtime , Disp: , Rfl:   •  fluticasone (FLONASE) 50 mcg/act nasal spray, 50 mcg into each nostril daily, Disp: , Rfl:   •  oxyCODONE (Roxicodone) 5 immediate release tablet, Take 1 tablet (5 mg total) by mouth every 4 (four) hours as needed for moderate pain for up to 15 doses Max Daily Amount: 30 mg (Patient not taking: Reported on 12/19/2022), Disp: 15 tablet, Rfl: 0    No Known Allergies    Objective: There were no vitals filed for this visit  Right Hand Exam     Tenderness   The patient is experiencing no tenderness  Other   Erythema: absent  Scars: present  Sensation: normal  Pulse: present      Left Hand Exam     Tenderness   Left hand tenderness location: Carpometacarpal joint thumb  Range of Motion   Wrist   Extension: 40   Flexion: 70     Other   Erythema: absent  Scars: absent  Sensation: normal  Pulse: present    Comments:  Significant deformity of the thumb at the carpometacarpal joint            Physical Exam  Vitals reviewed  HENT:      Head: Normocephalic and atraumatic  Eyes:      General:         Right eye: No discharge  Left eye: No discharge  Conjunctiva/sclera: Conjunctivae normal       Pupils: Pupils are equal, round, and reactive to light  Cardiovascular:      Rate and Rhythm: Normal rate     Pulmonary:      Effort: Pulmonary effort is normal  No respiratory distress  Musculoskeletal:      Cervical back: Normal range of motion and neck supple  Comments: As noted in HPI   Skin:     General: Skin is warm and dry  Neurological:      Mental Status: She is alert and oriented to person, place, and time  I have personally reviewed pertinent films in PACS and my interpretation is as follows:    Xrays of the left hand obtained on September 16, 2022 demonstrate severe carpometacarpal joint osteoarthritis with significant joint line narrowing, subchondral sclerosis and osteophyte formation  No obvious fractures demonstrated  This document was created using speech voice recognition software  Grammatical errors, random word insertions, pronoun errors, and incomplete sentences are an occasional consequence of this system due to software limitations, ambient noise, and hardware issues  Any formal questions or concerns about content, text, or information contained within the body of this dictation should be directly addressed to the provider for clarification

## 2022-12-19 NOTE — PROGRESS NOTES
Daily Note     Today's date: 2022  Patient name: Shira Crawfrod  : 1970  MRN: [id number]  Referring provider: Romayne Rama*  Dx:   Encounter Diagnosis     ICD-10-CM    1  Arthritis of carpometacarpal (CMC) joint of right thumb  M18 11                    Subjective: Patient reports no functional change  Objective: See treatment diary below     Khalifjohn Rocky 35 req  Auth Status Total   Visits  Expiration date Co-Insurance   approved 23                              Visit Tracker  Date  IE     x                                                                    PMHx:   has a past medical history of Ankle arthritis, Cancer ([facility] [address] ) (), Disease of thyroid gland, GERD (gastroesophageal reflux disease), History of transfusion, Hyperlipidemia, MS (congenital mitral stenosis), and Multiple sclerosis ([facility] [address] )  Precautions: Sx 10/27/22     Manuals HEP 2022                        Ther Ex     Education on HEP and dx x5 min  x5min    Towel scrunch w/ ext x10 3 sec hold x10 3 sec hold   AROM wrist flex/ext/RD/UD x10 x10   AROM thumb, all planes x10 x10   AROM thumb opposition w/ slides x10 x10   AROM forearm rotation (sup/pro) x10 x10   PROM wrist flex/ext x3 10 sec hold x3 10 sec hold    STM  x5 min    Digit ext band  x10 yellow   Flexbar (all planes)  x10 green   TheraPutty (/pinch)  x10 yellow             Ther act     Towel scrunch   x5   Juxtaciser  x10   Wrist rotator  x10   Dice, opposition  x25   Dice, pincher  x25 green   Dice, in-hand manipulation  x25   Iron balls  x3 min    Sponge squeeze  x10 blue   Gripper  x10 level 3   PREs wrist flex/ext/RD  x10 3 lb        Modalities     MHP 5 min  5 min           Assessment:   Patient tolerated session well  Session focused on HEP, ROM, patient education, and  strengthening to improve functional task performance with daily activities   Patient progressing well towards with continued tolerance for all TE/TA with no complaints  New exercises added to this visit without reports of pain  Patient benefiting from skilled hand therapy OT to reduce deficits to improve independence with daily activities  Plan:   Focus on ROM and strengthening to improve ability to complete daily activites with ease    POC 11/17/2022 - 2/8/2023

## 2022-12-28 ENCOUNTER — OFFICE VISIT (OUTPATIENT)
Dept: OCCUPATIONAL THERAPY | Facility: CLINIC | Age: 52
End: 2022-12-28

## 2022-12-28 DIAGNOSIS — M18.11 ARTHRITIS OF CARPOMETACARPAL (CMC) JOINT OF RIGHT THUMB: Primary | ICD-10-CM

## 2022-12-28 NOTE — PROGRESS NOTES
Daily Note     Today's date: 2022  Patient name: Shira Crawford  : 1970  MRN: [id number]  Referring provider: Romayne Rama*  Dx:   Encounter Diagnosis     ICD-10-CM    1  Arthritis of carpometacarpal (CMC) joint of right thumb  M18 11                    Subjective: Patient reports no functional change  Objective: See treatment diary below     Kwabenaradha Rocky 35 req  Auth Status Total   Visits  Expiration date Co-Insurance   approved 23                              Visit Tracker  Date  IE    x                                                                    PMHx:   has a past medical history of Ankle arthritis, Cancer ([facility] [address] ) (), Disease of thyroid gland, GERD (gastroesophageal reflux disease), History of transfusion, Hyperlipidemia, MS (congenital mitral stenosis), and Multiple sclerosis ([facility] [address] )  Precautions: Sx 10/27/22     Manuals HEP 2022                        Ther Ex     Education on HEP and dx x5 min  x5min    Towel scrunch w/ ext x10 3 sec hold x10 3 sec hold   AROM wrist flex/ext/RD/UD x10 x10   AROM thumb, all planes x10 x10   AROM thumb opposition w/ slides x10 x10   AROM forearm rotation (sup/pro) x10 x10   PROM wrist flex/ext x3 10 sec hold x3 10 sec hold    STM  x5 min    Digit ext band  x10 red   Flexbar (all planes)  x10 green   TheraPutty (/pinch)  x10 yellow             Ther act     Towel scrunch   x5   Juxtaciser  x10   Wrist rotator  x10   Dice, opposition  x25   Dice, pincher  x25 green   Gripper  x10 level 4   PREs wrist flex/ext/RD  x10 4 lb        Modalities     MHP 5 min  5 min           Assessment:   Patient tolerated session well  Session focused on HEP, ROM, patient education, and  strengthening to improve functional task performance with daily activities  Patient progressing well towards with continued tolerance for all TE/TA with no complaints   Patient benefiting from skilled hand therapy OT to reduce deficits to improve independence with daily activities  Plan:   Focus on ROM and strengthening to improve ability to complete daily activites with ease    POC 11/17/2022 - 2/8/2023

## 2023-01-03 DIAGNOSIS — G62.9 NEUROPATHY: ICD-10-CM

## 2023-01-03 RX ORDER — GABAPENTIN 300 MG/1
CAPSULE ORAL
Qty: 60 CAPSULE | Refills: 2 | Status: SHIPPED | OUTPATIENT
Start: 2023-01-03

## 2023-01-04 ENCOUNTER — OFFICE VISIT (OUTPATIENT)
Dept: NEUROLOGY | Facility: CLINIC | Age: 53
End: 2023-01-04

## 2023-01-04 VITALS
WEIGHT: 229 LBS | HEIGHT: 69 IN | SYSTOLIC BLOOD PRESSURE: 141 MMHG | BODY MASS INDEX: 33.92 KG/M2 | DIASTOLIC BLOOD PRESSURE: 79 MMHG | HEART RATE: 84 BPM | OXYGEN SATURATION: 98 % | TEMPERATURE: 96.7 F

## 2023-01-04 DIAGNOSIS — D32.9 MENINGIOMA (HCC): ICD-10-CM

## 2023-01-04 DIAGNOSIS — G31.84 MCI (MILD COGNITIVE IMPAIRMENT): ICD-10-CM

## 2023-01-04 DIAGNOSIS — H93.19 TINNITUS: ICD-10-CM

## 2023-01-04 DIAGNOSIS — G35 MULTIPLE SCLEROSIS (HCC): Primary | ICD-10-CM

## 2023-01-04 RX ORDER — TERIFLUNOMIDE 7 MG/1
7 TABLET, FILM COATED ORAL DAILY
Qty: 14 TABLET | Refills: 0 | Status: SHIPPED | OUTPATIENT
Start: 2023-01-04

## 2023-01-04 RX ORDER — RENAGEL 800 MG/1
14 TABLET ORAL DAILY
Qty: 30 TABLET | Refills: 3 | Status: SHIPPED | OUTPATIENT
Start: 2023-01-04

## 2023-01-04 NOTE — PROGRESS NOTES
Return NeuroOutpatient Note        Emerita Plascencia  [id number]  43 y o   1970       MS(Multiple Scerosis and Meningoma        History obtained from:  Patient     HPI/Subjective:    Emerita Plascencia is a 47 yo F with PMH of MS presents as f/u  She was diagnosed in 2003  Per my previous history, patient took copaxone for 2 years  She was getting bruises at each site and eventually tried natural, herbal therapy  She was noticing forgetfulness, word finding difficulty  She was thought to have Lyme and still wasn't getting better after abx so imaging finally showed demyelinating lesions  She had LP done to confirm it  Patient is not interested in taking any DMD  Initially, she had difficulty writing with right hand, vertigo which are her typical symptoms  With flare ups, she gets crawling sensation  Stress, physical exhaustion can be triggers  She can get fatigue easily  She avoids uneven surfaces  She used to get paresthesia but hasn't in a long time  Stress can make her eyes fatigued  Patient has been doing well except for elevated BP today  She has no deficits  Her only limitation is if on uneven surface, can lose balance  Patient had MRI brain IAC in sept of 2021 which revealed prior lesions but no significant change  She had ankle surgery in right foot and since she has limited herself physically  She was noted to have elevated bp at that time but though it was from pain  She was on provigil for fatigue  She's not on it anymore  She's on neurontin 100mg bid  She takes mobic as needed     She had ct brain in May which had revealed 6mm hyperdense partially calcified dural based nodule in right anterior interhemispheric falx, small meningioma        Past Medical History:   Diagnosis Date   • Ankle arthritis     right is having surgery soon for this   • Cancer [facility]) 2007    cervical   • Disease of thyroid gland     hypo   • GERD (gastroesophageal reflux disease)    • History of transfusion     age 3 • Hyperlipidemia    • MS (congenital mitral stenosis)     patient has multiple sclerosis    • Multiple sclerosis ([facility] [address] )      Social History     Socioeconomic History   • Marital status:      Spouse name: Not on file   • Number of children: Not on file   • Years of education: Not on file   • Highest education level: Not on file   Occupational History   • Not on file   Tobacco Use   • Smoking status: Former     Packs/day: 0 50     Years: 45 00     Pack years: 22 50     Types: Cigarettes     Quit date: 4/10/2020     Years since quittin 7   • Smokeless tobacco: Never   Vaping Use   • Vaping Use: Never used   Substance and Sexual Activity   • Alcohol use: Yes     Comment: 1-2 drinks once per week   • Drug use: Never   • Sexual activity: Yes   Other Topics Concern   • Not on file   Social History Narrative   • Not on file     Social Determinants of Health     Financial Resource Strain: Not on file   Food Insecurity: Not on file   Transportation Needs: Not on file   Physical Activity: Not on file   Stress: Not on file   Social Connections: Not on file   Intimate Partner Violence: Not on file   Housing Stability: Not on file     Family History   Problem Relation Age of Onset   • No Known Problems Mother      No Known Allergies  Current Outpatient Medications on File Prior to Visit   Medication Sig Dispense Refill   • azelastine (ASTELIN) 0 1 % nasal spray 2 sprays into each nostril daily     • cyclobenzaprine (FLEXERIL) 10 mg tablet Take 10 mg by mouth daily at bedtime      • estradiol (ESTRACE) 1 mg tablet Take 2 mg by mouth daily at bedtime       • famotidine (PEPCID) 10 mg tablet Take 10 mg by mouth daily at bedtime     • fexofenadine (ALLEGRA) 60 MG tablet Take 60 mg by mouth daily at bedtime      • fluticasone (FLONASE) 50 mcg/act nasal spray 50 mcg into each nostril daily     • gabapentin (NEURONTIN) 300 mg capsule TAKE 1 CAPSULE (300 MG TOTAL) BY MOUTH TWO (TWO) TIMES A DAY 60 capsule 2   • levothyroxine 50 mcg tablet Take 50 mcg by mouth daily     • losartan (COZAAR) 25 mg tablet Take 25 mg by mouth daily at bedtime     • montelukast (SINGULAIR) 10 mg tablet Take 10 mg by mouth daily at bedtime      • rosuvastatin (CRESTOR) 10 MG tablet Take 10 mg by mouth daily at bedtime      • oxyCODONE (Roxicodone) 5 immediate release tablet Take 1 tablet (5 mg total) by mouth every 4 (four) hours as needed for moderate pain for up to 15 doses Max Daily Amount: 30 mg (Patient not taking: Reported on 12/19/2022) 15 tablet 0     No current facility-administered medications on file prior to visit  Review of Systems   Refer to positive review of systems in HPI     Review of Systems    Constitutional- No fever  Eyes- No visual change  ENT- Hearing normal  CV- No chest pain  Resp- No Shortness of breath  GI- No diarrhea  - Bladder normal  MS- No Arthritis   Skin- No rash  Psych- No depression  Endo- No DM  Heme- No nodes    Vitals:    01/04/23 1347   BP: 141/79   BP Location: Right arm   Patient Position: Sitting   Cuff Size: Standard   Pulse: 84   Temp: (!) 96 7 °F (35 9 °C)   TempSrc: Tympanic   SpO2: 98%   Weight: 104 kg (229 lb)   Height: 5' 9" (1 753 m)       PHYSICAL EXAM:  Appearance: No Acute Distress  Ophthalmoscopic: Disc Flat, Normal fundus  Mental status:  Orientation: Awake, Alert, and Orientedx3  Memory: Registation 3/3 Recall 3/3  Attention: normal  Knowledge: good  Language: No aphasia  Speech: No dysarthria  Cranial Nerves:  2 No Visual Defect on Confrontation, Pupils round, equal, reactive to light  3,4,6 Extraocular Movements Intact, no nystagmus  5 Facial Sensation Intact  7 No facial asymmetry  8 Intact hearing  9,10 Palate symmetric, normal gag  11 Good shoulder shrug  12 Tongue Midline  Gait: Stable  Coordination: No ataxia with finger to nose testing, and heel to shin  Sensory: Intact, Symmetric to pinprick, light touch, vibration, and joint position  Muscle Tone: Normal              Muscle exam:  Arm Right Left Leg Right Left   Deltoid 5/5 5/5 Iliopsoas 5/5 5/5   Biceps 5/5 5/5 Quads 5/5 5/5   Triceps 5/5 5/5 Hamstrings 5/5 5/5   Wrist Extension 5/5 5/5 Ankle Dorsi Flexion 5/5 5/5   Wrist Flexion 5/5 5/5 Ankle Plantar Flexion 5/5 5/5   Interossei 5/5 5/5 Ankle Eversion 5/5 5/5   APB 5/5 5/5 Ankle Inversion 5/5 5/5       Reflexes   RJ BJ TJ KJ AJ Plantars Guerra's   Right 2+ 2+ 2+ 2+ 2+ Downgoing Not present   Left 2+ 2+ 2+ 2+ 2+ Downgoing Not present     Personal review of  Labs:                  Diagnoses and all orders for this visit:      1  Multiple sclerosis ([facility] [address] )  MRI brain MS wo and w contrast    Teriflunomide (Aubagio) 7 MG TABS    Teriflunomide (Aubagio) 14 MG TABS      2  Tinnitus        3  Meningioma ([facility] [address] )  MRI brain MS wo and w contrast      4  MCI (mild cognitive impairment)            Clinically patient has remained stable  Will repeat MRI brain MS protocol for surveillance  Patient is now considering starting DMD  We discussed options of Aubagio vs Ponvory  She wants to try aubagio as it has been around for longer  She is concerned about worsening her short term memory  Will do f/u for meningioma with updated MRI brain as well                 Total time of encounter:  40 min  More than 50% of the time was used in counseling and/or coordination of care  Extent of counseling and/or coordination of care        MD [facility]  [address]  [address]  [phone]

## 2023-01-05 ENCOUNTER — TELEPHONE (OUTPATIENT)
Dept: NEUROLOGY | Facility: CLINIC | Age: 53
End: 2023-01-05

## 2023-01-05 DIAGNOSIS — N28.9 RENAL DYSFUNCTION: Primary | ICD-10-CM

## 2023-01-05 NOTE — TELEPHONE ENCOUNTER
Pt left a VM re: CMP be ordered before her MRI  Transcribed VM:   Hi, my name is Avinash Rocha  I'm calling about blood work that I need to have done before  I can have my MRI  My MRI is scheduled for February 9th  The woman i scheduled with said I need to have either a metabolic panel, a comprehensive metabolic panel, or a BUN and Creatinine  I don't, I'm not sure I'm saying it right  But regardless, i needed a metabolic panel ordered  So if you could put that into my order that then I can just go into the hospital and have my blood drawn  My name, again, my name is Avinash Rochawilfredo ROGELIO Felicianouel Core B is [phone] and my phone number is [phone]  My address to verify whatever is [address], [facility]  Thank you very much for your time and I look forward to hearing from you  Thank you

## 2023-01-23 ENCOUNTER — TELEPHONE (OUTPATIENT)
Dept: NEUROLOGY | Facility: CLINIC | Age: 53
End: 2023-01-23

## 2023-01-23 DIAGNOSIS — G35 MULTIPLE SCLEROSIS (HCC): Primary | ICD-10-CM

## 2023-01-23 RX ORDER — ASCORBATE CALCIUM 500 MG
500 TABLET ORAL DAILY
COMMUNITY

## 2023-01-23 RX ORDER — MELATONIN
1000 DAILY
COMMUNITY

## 2023-01-23 NOTE — PRE-PROCEDURE INSTRUCTIONS
My Surgical Experience    The following information was developed to assist you to prepare for your operation  What do I need to do before coming to the hospital?  • Arrange for a responsible person to drive you to and from the hospital   • Arrange care for your children at home  Children are not allowed in the recovery areas of the hospital  • Plan to wear clothing that is easy to put on and take off  If you are having shoulder surgery, wear a shirt that buttons or zippers in the front  Bathing  o Shower the evening before and the morning of your surgery with an antibacterial soap  Please refer to the Pre Op Showering Instructions for Surgery Patients Sheet   o Remove nail polish and all body piercing jewelry  o Do not shave any body part for at least 24 hours before surgery-this includes face, arms, legs and upper body  Food  o Nothing to eat or drink after midnight the night before your surgery  This includes candy and chewing gum  o Exception: If your surgery is after 12:00pm (noon), you may have clear liquids such as 7-Up®, ginger ale, apple or cranberry juice, Jell-O®, water, or clear broth until 8:00 am  o Do not drink milk or juice with pulp on the morning before surgery  o Do not drink alcohol 24 hours before surgery  Medicine  o Follow instructions you received from your surgeon about which medicines you may take on the day of surgery  o If instructed to take medicine on the morning of surgery, take pills with just a small sip of water  Call your prescribing doctor for specific infroamtion on what to do if you take insulin    What should I bring to the hospital?    Bring:  • Crutches or a walker, if you have them, for foot or knee surgery  • A list of the daily medicines, vitamins, minerals, herbals and nutritional supplements you take   Include the dosages of medicines and the time you take them each day  • Glasses, dentures or hearing aids  • Minimal clothing; you will be wearing hospital sleepwear  • Photo ID; required to verify your identity  • If you have a Living Will or Power of , bring a copy of the documents  • If you have an ostomy, bring an extra pouch and any supplies you use    Do not bring  • Medicines or inhalers  • Money, valuables or jewelry    What other information should I know about the day of surgery? • Notify your surgeons if you develop a cold, sore throat, cough, fever, rash or any other illness  • Report to the Ambulatory Surgical/Same Day Surgery Unit  • You will be instructed to stop at Registration only if you have not been pre-registered  • Inform your  fi they do not stay that they will be asked by the staff to leave a phone number where they can be reached  • Be available to be reached before surgery  In the event the operating room schedule changes, you may be asked to come in earlier or later than expected    *It is important to tell your doctor and others involved in your health care if you are taking or have been taking any non-prescription drugs, vitamins, minerals, herbals or other nutritional supplements  Any of these may interact with some food or medicines and cause a reaction      Pre-Surgery Instructions:   Medication Instructions   • azelastine (ASTELIN) 0 1 % nasal spray Take night before surgery   • Calcium Ascorbate (VITAMIN C) 500 mg tablet Hold day of surgery  • cholecalciferol (VITAMIN D3) 1,000 units tablet Hold day of surgery  • cyclobenzaprine (FLEXERIL) 10 mg tablet Take night before surgery   • estradiol (ESTRACE) 1 mg tablet Take night before surgery   • famotidine (PEPCID) 10 mg tablet Take night before surgery   • fexofenadine (ALLEGRA) 60 MG tablet Take night before surgery   • fluticasone (FLONASE) 50 mcg/act nasal spray Take night before surgery   • gabapentin (NEURONTIN) 300 mg capsule Take day of surgery  • levothyroxine 50 mcg tablet Take day of surgery     • losartan (COZAAR) 25 mg tablet Take night before surgery   • montelukast (SINGULAIR) 10 mg tablet Take night before surgery   • rosuvastatin (CRESTOR) 10 MG tablet Take night before surgery

## 2023-01-24 DIAGNOSIS — M18.12 OSTEOARTHRITIS OF CARPOMETACARPAL (CMC) JOINT OF LEFT THUMB, UNSPECIFIED OSTEOARTHRITIS TYPE: ICD-10-CM

## 2023-01-24 DIAGNOSIS — Z11.59 SPECIAL SCREENING EXAMINATION FOR VIRAL DISEASE: ICD-10-CM

## 2023-01-25 LAB — SARS-COV-2 RNA RESP QL NAA+PROBE: NEGATIVE

## 2023-01-25 RX ORDER — DIMETHYL FUMARATE 120 MG/1
120 CAPSULE ORAL DAILY
Qty: 14 CAPSULE | Refills: 0 | Status: SHIPPED | OUTPATIENT
Start: 2023-01-25

## 2023-01-25 RX ORDER — DIMETHYL FUMARATE 240 MG/1
240 CAPSULE ORAL DAILY
Qty: 30 CAPSULE | Refills: 2 | Status: SHIPPED | OUTPATIENT
Start: 2023-01-25

## 2023-01-25 NOTE — TELEPHONE ENCOUNTER
Lelo Santana MD  to Me      12:49 PM  She had tried copaxone for 2 years many years ago  Does that work?

## 2023-01-25 NOTE — TELEPHONE ENCOUNTER
PA denied  Letter states that pt must have tried and failed formulary options: generic Copaxone, generic Tecfidera, generic Gilenya, betaseron, Avonex, Rebif  Dr Marylyn Kocher - Please advise

## 2023-01-25 NOTE — TELEPHONE ENCOUNTER
Called pt and she states that she has only ever tried Copaxone and Aubagio  Pt agreeable to try other formulary medication  Pt has not tried Tecfidera  Pt asking for call back when medication is sent to pharmacy

## 2023-01-25 NOTE — TELEPHONE ENCOUNTER
Chema Rice MD  to Me      2:12 PM  [id number] [address]jo can you ask patient whether she'll try oral tecfidera? Even for 2-3 months, we can use this to get to [place]

## 2023-01-25 NOTE — TELEPHONE ENCOUNTER
Called insurance at [phone]  Spoke with Zulma and provided additional information that pt has tried Copaxone  Rep states that even though it does not indicate in the denial letter, pt must have tried and failed 2 formulary medications  Dr Lucía Mckeon - Please advise

## 2023-01-26 NOTE — TELEPHONE ENCOUNTER
Gilford Lincoln, MD  to Me      5:27 PM  Jaclyn Velez sent    -----------------------------------------------------    Spoke with pharmacist and he confirms that script for Dimethyl Fumarate 120 mg has been filled, just awaiting  from patient  Says 240 mg on hold until pt finishes lower dose  Nothing further at this time

## 2023-01-27 ENCOUNTER — ANESTHESIA EVENT (OUTPATIENT)
Dept: PERIOP | Facility: HOSPITAL | Age: 53
End: 2023-01-27

## 2023-01-30 ENCOUNTER — HOSPITAL ENCOUNTER (OUTPATIENT)
Facility: HOSPITAL | Age: 53
Setting detail: OUTPATIENT SURGERY
Discharge: HOME | End: 2023-01-30
Attending: ORTHOPAEDIC SURGERY | Admitting: ORTHOPAEDIC SURGERY

## 2023-01-30 ENCOUNTER — ANESTHESIA (OUTPATIENT)
Dept: PERIOP | Facility: HOSPITAL | Age: 53
End: 2023-01-30

## 2023-01-30 VITALS
HEART RATE: 78 BPM | DIASTOLIC BLOOD PRESSURE: 64 MMHG | OXYGEN SATURATION: 96 % | SYSTOLIC BLOOD PRESSURE: 114 MMHG | RESPIRATION RATE: 18 BRPM | TEMPERATURE: 97.6 F

## 2023-01-30 DIAGNOSIS — M18.12 PRIMARY OSTEOARTHRITIS OF FIRST CARPOMETACARPAL JOINT OF LEFT HAND: Primary | ICD-10-CM

## 2023-01-30 DEVICE — IMPL SYS,CMC MINI T-ROPE,1.1 MM
Type: IMPLANTABLE DEVICE | Site: THUMB | Status: FUNCTIONAL
Brand: ARTHREX®

## 2023-01-30 RX ORDER — CEFAZOLIN SODIUM 2 G/50ML
2000 SOLUTION INTRAVENOUS ONCE
Status: COMPLETED | OUTPATIENT
Start: 2023-01-30 | End: 2023-01-30

## 2023-01-30 RX ORDER — FENTANYL CITRATE/PF 50 MCG/ML
25 SYRINGE (ML) INJECTION
Status: DISCONTINUED | OUTPATIENT
Start: 2023-01-30 | End: 2023-01-30 | Stop reason: HOSPADM

## 2023-01-30 RX ORDER — BUPIVACAINE HYDROCHLORIDE 5 MG/ML
INJECTION, SOLUTION PERINEURAL
Status: COMPLETED | OUTPATIENT
Start: 2023-01-30 | End: 2023-01-30

## 2023-01-30 RX ORDER — GLYCOPYRROLATE 0.2 MG/ML
INJECTION INTRAMUSCULAR; INTRAVENOUS AS NEEDED
Status: DISCONTINUED | OUTPATIENT
Start: 2023-01-30 | End: 2023-01-30

## 2023-01-30 RX ORDER — OXYCODONE HYDROCHLORIDE 5 MG/1
5 TABLET ORAL EVERY 4 HOURS PRN
Qty: 15 TABLET | Refills: 0 | Status: SHIPPED | OUTPATIENT
Start: 2023-01-30

## 2023-01-30 RX ORDER — PROPOFOL 10 MG/ML
INJECTION, EMULSION INTRAVENOUS AS NEEDED
Status: DISCONTINUED | OUTPATIENT
Start: 2023-01-30 | End: 2023-01-30

## 2023-01-30 RX ORDER — LIDOCAINE HYDROCHLORIDE 10 MG/ML
INJECTION, SOLUTION EPIDURAL; INFILTRATION; INTRACAUDAL; PERINEURAL AS NEEDED
Status: DISCONTINUED | OUTPATIENT
Start: 2023-01-30 | End: 2023-01-30

## 2023-01-30 RX ORDER — SODIUM CHLORIDE, SODIUM LACTATE, POTASSIUM CHLORIDE, CALCIUM CHLORIDE 600; 310; 30; 20 MG/100ML; MG/100ML; MG/100ML; MG/100ML
INJECTION, SOLUTION INTRAVENOUS CONTINUOUS PRN
Status: DISCONTINUED | OUTPATIENT
Start: 2023-01-30 | End: 2023-01-30

## 2023-01-30 RX ORDER — FENTANYL CITRATE 50 UG/ML
INJECTION, SOLUTION INTRAMUSCULAR; INTRAVENOUS AS NEEDED
Status: DISCONTINUED | OUTPATIENT
Start: 2023-01-30 | End: 2023-01-30

## 2023-01-30 RX ORDER — DEXAMETHASONE SODIUM PHOSPHATE 4 MG/ML
INJECTION, SOLUTION INTRA-ARTICULAR; INTRALESIONAL; INTRAMUSCULAR; INTRAVENOUS; SOFT TISSUE AS NEEDED
Status: DISCONTINUED | OUTPATIENT
Start: 2023-01-30 | End: 2023-01-30

## 2023-01-30 RX ORDER — ONDANSETRON 2 MG/ML
INJECTION INTRAMUSCULAR; INTRAVENOUS AS NEEDED
Status: DISCONTINUED | OUTPATIENT
Start: 2023-01-30 | End: 2023-01-30

## 2023-01-30 RX ORDER — SODIUM CHLORIDE, SODIUM LACTATE, POTASSIUM CHLORIDE, CALCIUM CHLORIDE 600; 310; 30; 20 MG/100ML; MG/100ML; MG/100ML; MG/100ML
125 INJECTION, SOLUTION INTRAVENOUS CONTINUOUS
Status: DISCONTINUED | OUTPATIENT
Start: 2023-01-30 | End: 2023-01-30 | Stop reason: HOSPADM

## 2023-01-30 RX ORDER — MIDAZOLAM HYDROCHLORIDE 2 MG/2ML
INJECTION, SOLUTION INTRAMUSCULAR; INTRAVENOUS AS NEEDED
Status: DISCONTINUED | OUTPATIENT
Start: 2023-01-30 | End: 2023-01-30

## 2023-01-30 RX ORDER — MAGNESIUM HYDROXIDE 1200 MG/15ML
LIQUID ORAL AS NEEDED
Status: DISCONTINUED | OUTPATIENT
Start: 2023-01-30 | End: 2023-01-30 | Stop reason: HOSPADM

## 2023-01-30 RX ADMIN — GLYCOPYRROLATE 0.1 MG: 0.2 INJECTION, SOLUTION INTRAMUSCULAR; INTRAVENOUS at 14:02

## 2023-01-30 RX ADMIN — LIDOCAINE HYDROCHLORIDE 50 MG: 10 INJECTION, SOLUTION EPIDURAL; INFILTRATION; INTRACAUDAL; PERINEURAL at 13:38

## 2023-01-30 RX ADMIN — SODIUM CHLORIDE, SODIUM LACTATE, POTASSIUM CHLORIDE, AND CALCIUM CHLORIDE: .6; .31; .03; .02 INJECTION, SOLUTION INTRAVENOUS at 11:59

## 2023-01-30 RX ADMIN — MIDAZOLAM 2 MG: 1 INJECTION INTRAMUSCULAR; INTRAVENOUS at 13:10

## 2023-01-30 RX ADMIN — ONDANSETRON 4 MG: 2 INJECTION INTRAMUSCULAR; INTRAVENOUS at 13:45

## 2023-01-30 RX ADMIN — PROPOFOL 200 MG: 10 INJECTION, EMULSION INTRAVENOUS at 13:38

## 2023-01-30 RX ADMIN — DEXAMETHASONE SODIUM PHOSPHATE 4 MG: 4 INJECTION, SOLUTION INTRA-ARTICULAR; INTRALESIONAL; INTRAMUSCULAR; INTRAVENOUS; SOFT TISSUE at 13:45

## 2023-01-30 RX ADMIN — CEFAZOLIN SODIUM 2000 MG: 2 SOLUTION INTRAVENOUS at 13:37

## 2023-01-30 RX ADMIN — BUPIVACAINE HYDROCHLORIDE 20 ML: 5 INJECTION, SOLUTION PERINEURAL at 13:10

## 2023-01-30 RX ADMIN — FENTANYL CITRATE 50 MCG: 50 INJECTION, SOLUTION INTRAMUSCULAR; INTRAVENOUS at 13:45

## 2023-01-30 RX ADMIN — FENTANYL CITRATE 50 MCG: 50 INJECTION, SOLUTION INTRAMUSCULAR; INTRAVENOUS at 13:38

## 2023-01-30 NOTE — ANESTHESIA PROCEDURE NOTES
Peripheral Block    Patient location during procedure: pre-op  Start time: 1/30/2023 1:10 PM  Reason for block: at surgeon's request and post-op pain management  Staffing  Performed: Anesthesiologist   Anesthesiologist: Ho Espinoza MD  Preanesthetic Checklist  Completed: patient identified, IV checked, site marked, risks and benefits discussed, surgical consent, monitors and equipment checked, pre-op evaluation and timeout performed  Peripheral Block  Patient position: supine  Prep: ChloraPrep  Patient monitoring: heart rate, continuous pulse ox and frequent blood pressure checks  Block type: supraclavicular  Laterality: left  Injection technique: single-shot  Procedures: ultrasound guided, Ultrasound guidance required for the procedure to increase accuracy and safety of medication placement and decrease risk of complications    Ultrasound permanent image savedbupivacaine (MARCAINE) 0 5 % - Perineural   20 mL - 1/30/2023 1:10:00 PM  Needle  Needle type: Stimuplex   Needle gauge: 22 G  Needle length: 10 cm  Needle localization: ultrasound guidance  Assessment  Injection assessment: incremental injection, local visualized surrounding nerve on ultrasound, negative aspiration for heme and no paresthesia on injection  Paresthesia pain: none  Heart rate change: no  Slow fractionated injection: yes  Post-procedure:  site cleaned  patient tolerated the procedure well with no immediate complications

## 2023-01-30 NOTE — PERIOPERATIVE NURSING NOTE
Patient received into [facility] via stretcher from [facility]  Patient awake and alert, denies pain or discomfort  Left arm cast with ace bandage, clean, dry and intact  Left arm in sling  Fingers warm 2 fingers are mobile  Good capillary refill

## 2023-01-30 NOTE — DISCHARGE INSTR - AVS FIRST PAGE
INSTRUCTIONS FOLLOWING YOUR HAND/WRIST SURGERY     First follow-up visit after surgery   Call the office the day after your surgery & make an appointment for 10-14 days after surgery to see Dr Lawyer Box  At that appointment, your sutures will be removed  Dressing & Wound Care   If you are in a splint, keep the original dressing on and dry until you are seen by Dr Lawyer Box  You may take a bath by covering your arm in a garbage bag or other waterproof bag  Tie it securely with rubber bands and duct tape  Keep your dressing as clean as possible     If you are only in a soft dressing, you may take the dressing off at 3 days after your operation; at which point you may shower and get the incision wet  Do not soak the wound in dish, bath, or pool water until after the stitches have been removed     Swelling and Discoloration   You will notice some swelling of your hand - this is normal  Elevate your hand above the level of your heart as much as possible for the first week  When you sleep, place your hand on several pillows  You may notice a bluish discoloration of your fingers  This is also normal  This discoloration may change from blue to purple to green to yellow, then will slowly go away over a few weeks time  Discomfort   You will experience some pain and discomfort after surgery  By the third day, this pain usually decreases significantly  You will be given a prescription for pain medication  Take the medication as prescribed  If the discomfort is mild, you can take 1 or 2 Tylenol every 4 - 6 hours as needed, instead of the prescribed pain medication  Temperature   A temperature of up to 101  5ºF is common for the first 2 days after surgery  If your temperature is elevated above 101 5º F, call the office  Exercise   Unless instructed otherwise, you may gently open and close your fingers  Do not perform any exercises that cause you to sweat   Sweating may increase complications with your incision  We hope this answers many of your questions regarding your surgery  These are only guidelines however  If you have any other concerns or questions at any time, do not hesitate to call the office [phone]

## 2023-01-30 NOTE — H&P
Assessment/Plan:  The patient would like to proceed with left thumb cmc arthroplasty  We discussed the procedure and risks at length, including but not limited to, infection, bleeding, wound issues, nerve injury, blood clot, stiffness, failure of procedure, and need for additional surgery  We will see the patient back at the time of surgery  Subjective:   Tim Ch is a 46 y o  female with left thumb cmc arthritis who notes ongoing pain about the base of her thumb  Review of Systems   Constitutional: Negative  Negative for chills and fever  HENT: Negative  Negative for ear pain and sore throat  Eyes: Negative  Negative for pain and redness  Respiratory: Negative  Negative for shortness of breath and wheezing  Cardiovascular: Negative for chest pain and palpitations  Gastrointestinal: Negative  Negative for abdominal pain and blood in stool  Endocrine: Negative  Negative for polydipsia and polyuria  Genitourinary: Negative  Negative for difficulty urinating and dysuria  Musculoskeletal:        As noted in HPI   Skin: Negative  Negative for pallor and rash  Neurological: Negative  Negative for dizziness and numbness  Hematological: Negative  Negative for adenopathy  Does not bruise/bleed easily  Psychiatric/Behavioral: Negative  Negative for confusion and suicidal ideas           Past Medical History:   Diagnosis Date   • Ankle arthritis     right is having surgery soon for this   • Cancer [facility]) 2007    cervical   • Disease of thyroid gland     hypo   • GERD (gastroesophageal reflux disease)    • History of transfusion     age 3   • Hyperlipidemia    • MS (congenital mitral stenosis)     patient has multiple sclerosis    • Multiple sclerosis ([facility] [address] )        Past Surgical History:   Procedure Laterality Date   • CARPAL TUNNEL RELEASE Right    • COLONOSCOPY     • HYSTERECTOMY     • NM ARTHRP INTERPOS INTERCARPAL/METACARPAL JOINTS Right 10/27/2022    Procedure: ARTHROPLASTY Tristian Winters; CARPOMETACARPAL ARTHROPLASTY RIGHT THUMB;  Surgeon: Nelida Scheuermann, MD;  Location: [facility] MAIN OR;  Service: Orthopedics   • SD NEUROPLASTY &/TRANSPOS MEDIAN NRV CARPAL Evelyne Lis Right 2021    Procedure: RELEASE CARPAL TUNNEL;  Surgeon: Karen Osorio DO;  Location: [facility] MAIN OR;  Service: Orthopedics   • SD OSTEOTOMY CALCANEUS W/WO INTERNAL FIXATION Right 10/20/2021    Procedure: OSTEOTOMY CALCANEUS, Medial slide calcaneal osteotomy, FDL tendon transfer to navicular, Cotton osteotomy, Achilles lengthening, Wilson osteotomy;  Surgeon: Meek Garcia MD;  Location: [facility] MAIN OR;  Service: Orthopedics   • SINUS SURGERY     • WRIST SURGERY Left        Family History   Problem Relation Age of Onset   • No Known Problems Mother        Social History     Occupational History   • Not on file   Tobacco Use   • Smoking status: Former     Packs/day: 0 50     Years: 45 00     Pack years: 22 50     Types: Cigarettes     Quit date: 4/10/2020     Years since quittin 8   • Smokeless tobacco: Never   Vaping Use   • Vaping Use: Never used   Substance and Sexual Activity   • Alcohol use: Yes     Comment: 1-2 drinks once per week   • Drug use: Never   • Sexual activity: Yes         Current Facility-Administered Medications:   •  lactated ringers infusion, 125 mL/hr, Intravenous, Continuous, Venida Fleischer, MD    No Known Allergies    Objective:  Vitals:    23 1149   BP: 131/83   Pulse: 83   Resp: 16   Temp: (!) 96 8 °F (36 °C)   SpO2: 98%       Ortho Exam    Physical Exam  Constitutional:       General: She is not in acute distress  Appearance: She is well-developed  HENT:      Head: Normocephalic and atraumatic  Eyes:      General: No scleral icterus  Conjunctiva/sclera: Conjunctivae normal    Neck:      Vascular: No JVD  Cardiovascular:      Rate and Rhythm: Normal rate  Pulmonary:      Effort: Pulmonary effort is normal  No respiratory distress  Skin:     General: Skin is warm  Neurological:      Mental Status: She is alert and oriented to person, place, and time  Coordination: Coordination normal          Left hand: Thumb CMC tenderness  This document was created using speech voice recognition software  Grammatical errors, random word insertions, pronoun errors, and incomplete sentences are an occasional consequence of this system due to software limitations, ambient noise, and hardware issues  Any formal questions or concerns about content, text, or information contained within the body of this dictation should be directly addressed to the provider for clarification

## 2023-01-30 NOTE — ANESTHESIA PREPROCEDURE EVALUATION
Procedure: Thumb Carpometacarpal Arthroplasty (Left: Hand)    Relevant Problems   CARDIO   (+) Hyperlipidemia      ENDO   (+) Hypothyroidism      GI/HEPATIC   (+) Gastroesophageal reflux disease        Physical Exam    Airway    Mallampati score: II  TM Distance: >3 FB  Neck ROM: full     Dental   No notable dental hx     Cardiovascular  Cardiovascular exam normal    Pulmonary  Pulmonary exam normal     Other Findings        Anesthesia Plan  ASA Score- 2     Anesthesia Type- general with ASA Monitors  Additional Monitors:   Airway Plan: LMA  Plan Factors-Exercise tolerance (METS): >4 METS  Chart reviewed  Imaging results reviewed  Existing labs reviewed  Patient summary reviewed  Patient is not a current smoker  Induction- intravenous  Postoperative Plan- Plan for postoperative opioid use  Informed Consent- Anesthetic plan and risks discussed with patient  I personally reviewed this patient with the CRNA  Discussed and agreed on the Anesthesia Plan with the CRNA  Theodora Gray

## 2023-02-09 ENCOUNTER — HOSPITAL ENCOUNTER (OUTPATIENT)
Dept: RADIOLOGY | Facility: HOSPITAL | Age: 53
Discharge: HOME | End: 2023-02-09
Attending: PSYCHIATRY & NEUROLOGY

## 2023-02-09 DIAGNOSIS — G35 MULTIPLE SCLEROSIS (HCC): ICD-10-CM

## 2023-02-09 DIAGNOSIS — D32.9 MENINGIOMA (HCC): ICD-10-CM

## 2023-02-09 RX ADMIN — GADOBUTROL 10 ML: 604.72 INJECTION INTRAVENOUS at 13:35

## 2023-02-10 ENCOUNTER — TELEPHONE (OUTPATIENT)
Dept: NEUROLOGY | Facility: CLINIC | Age: 53
End: 2023-02-10

## 2023-02-10 NOTE — TELEPHONE ENCOUNTER
Patient took Dimethyl Fumarate during the week put yesterday afternoon she had an allergic reaction  She felt like her skin was burning  She didn't take any today  Please call patient at [phone]

## 2023-02-13 ENCOUNTER — OFFICE VISIT (OUTPATIENT)
Dept: OBGYN CLINIC | Facility: CLINIC | Age: 53
End: 2023-02-13

## 2023-02-13 VITALS
BODY MASS INDEX: 33.92 KG/M2 | HEART RATE: 96 BPM | WEIGHT: 229 LBS | HEIGHT: 69 IN | DIASTOLIC BLOOD PRESSURE: 82 MMHG | SYSTOLIC BLOOD PRESSURE: 152 MMHG

## 2023-02-13 DIAGNOSIS — G35 MULTIPLE SCLEROSIS (HCC): Primary | ICD-10-CM

## 2023-02-13 DIAGNOSIS — M18.12 OSTEOARTHRITIS OF CARPOMETACARPAL (CMC) JOINT OF LEFT THUMB, UNSPECIFIED OSTEOARTHRITIS TYPE: Primary | ICD-10-CM

## 2023-02-13 RX ORDER — TERIFLUNOMIDE 7 MG/1
7 TABLET, FILM COATED ORAL DAILY
Qty: 14 TABLET | Refills: 0 | Status: SHIPPED | OUTPATIENT
Start: 2023-02-13

## 2023-02-13 RX ORDER — RENAGEL 800 MG/1
14 TABLET ORAL DAILY
Qty: 30 TABLET | Refills: 3 | Status: SHIPPED | OUTPATIENT
Start: 2023-02-13

## 2023-02-13 NOTE — TELEPHONE ENCOUNTER
Samuel Watkins MD  You 19 minutes ago (1:53 PM)     Yes tecfidera should be on allergy list for her  I placed orders for aubagio

## 2023-02-13 NOTE — TELEPHONE ENCOUNTER
Pt reported that she started last week,took it first time and had symptoms,second dose she felt little hot ,and when she took the  3rd dose about  half hour later face was flushed, bright red all over her skin ,itchiness   Pt said she stopped the medication and her symptoms has resolved since      If you agreeable place orders for Aubagio and we can try obtaining PA again , pt is agreeable

## 2023-02-13 NOTE — PROGRESS NOTES
Assessment/Plan:  1  Osteoarthritis of carpometacarpal (CMC) joint of left thumb, unspecified osteoarthritis type  Durable Medical Equipment        The patient is doing well and was placed into a thumb spica brace today  She will start occupational therapy on the [facility] arthroplasty protocol  She will avoid any significant activity or heavy lifting with this hand  She will follow-up in 4 weeks for repeat evaluation  Subjective:   Maria Eugenia Calabrese is a 46 y o  female who presents today for follow-up of her left thumb, now about 2 weeks status post [facility] arthroplasty  She is doing well and notes minimal pain  She has been in her splint since the time of surgery  She notes good sensation of the left upper extremity  Review of Systems      Past Medical History:   Diagnosis Date   • Ankle arthritis     right is having surgery soon for this   • Cancer [facility]) 2007    cervical   • Disease of thyroid gland     hypo   • GERD (gastroesophageal reflux disease)    • History of transfusion     age 3   • Hyperlipidemia    • MS (congenital mitral stenosis)     patient has multiple sclerosis    • Multiple sclerosis ([address] )        Past Surgical History:   Procedure Laterality Date   • CARPAL TUNNEL RELEASE Right    • COLONOSCOPY     • HYSTERECTOMY     • NH ARTHRP INTERPOS INTERCARPAL/METACARPAL JOINTS Right 10/27/2022    Procedure: ARTHROPLASTY THUMB Jade Cunning; CARPOMETACARPAL ARTHROPLASTY RIGHT THUMB;  Surgeon: Pepe Boles MD;  Location: [address] [place] [address];  Service: Orthopedics   • NH ARTHRP INTERPOS INTERCARPAL/METACARPAL JOINTS Left 1/30/2023    Procedure:  Thumb Carpometacarpal Arthroplasty;  Surgeon: Pepe Boles MD;  Location: [address] [place] [address];  Service: Orthopedics   • NH NEUROPLASTY &/TRANSPOS MEDIAN NRV CARPAL Rainell Bile Right 9/17/2021    Procedure: RELEASE CARPAL TUNNEL;  Surgeon: Mellisa Pena DO;  Location: [address] [place] [address];  Service: Orthopedics   • NH OSTEOTOMY CALCANEUS W/WO INTERNAL FIXATION Right 10/20/2021 Procedure: OSTEOTOMY CALCANEUS, Medial slide calcaneal osteotomy, FDL tendon transfer to navicular, Cotton osteotomy, Achilles lengthening, Wilson osteotomy;  Surgeon: Pepe Chapman MD;  Location: [facility] MAIN OR;  Service: Orthopedics   • SINUS SURGERY     • WRIST SURGERY Left        Family History   Problem Relation Age of Onset   • No Known Problems Mother        Social History     Occupational History   • Not on file   Tobacco Use   • Smoking status: Former     Packs/day: 0 50     Years: 45 00     Pack years: 22 50     Types: Cigarettes     Quit date: 4/10/2020     Years since quittin 8   • Smokeless tobacco: Never   Vaping Use   • Vaping Use: Never used   Substance and Sexual Activity   • Alcohol use: Yes     Comment: 1-2 drinks once per week   • Drug use: Never   • Sexual activity: Yes         Current Outpatient Medications:   •  azelastine (ASTELIN) 0 1 % nasal spray, 2 sprays into each nostril daily, Disp: , Rfl:   •  Calcium Ascorbate (VITAMIN C) 500 mg tablet, Take 500 mg by mouth daily, Disp: , Rfl:   •  cholecalciferol (VITAMIN D3) 1,000 units tablet, Take 1,000 Units by mouth daily, Disp: , Rfl:   •  cyclobenzaprine (FLEXERIL) 10 mg tablet, Take 10 mg by mouth daily at bedtime , Disp: , Rfl:   •  Dimethyl Fumarate 120 MG CPDR, Take 1 capsule (120 mg total) by mouth in the morning, Disp: 14 capsule, Rfl: 0  •  Dimethyl Fumarate 240 MG CPDR, Take 1 capsule (240 mg total) by mouth in the morning Start after completion of 120mg for 2 weeks  , Disp: 30 capsule, Rfl: 2  •  estradiol (ESTRACE) 1 mg tablet, Take 2 mg by mouth daily at bedtime  , Disp: , Rfl:   •  famotidine (PEPCID) 10 mg tablet, Take 10 mg by mouth daily at bedtime, Disp: , Rfl:   •  fexofenadine (ALLEGRA) 60 MG tablet, Take 60 mg by mouth daily at bedtime , Disp: , Rfl:   •  gabapentin (NEURONTIN) 300 mg capsule, TAKE 1 CAPSULE (300 MG TOTAL) BY MOUTH TWO (TWO) TIMES A DAY, Disp: 60 capsule, Rfl: 2  •  levothyroxine 50 mcg tablet, Take 50 mcg by mouth daily, Disp: , Rfl:   •  losartan (COZAAR) 25 mg tablet, Take 25 mg by mouth daily at bedtime, Disp: , Rfl:   •  montelukast (SINGULAIR) 10 mg tablet, Take 10 mg by mouth daily at bedtime , Disp: , Rfl:   •  oxyCODONE (Roxicodone) 5 immediate release tablet, Take 1 tablet (5 mg total) by mouth every 4 (four) hours as needed for moderate pain for up to 15 doses Max Daily Amount: 30 mg, Disp: 15 tablet, Rfl: 0  •  rosuvastatin (CRESTOR) 10 MG tablet, Take 10 mg by mouth daily at bedtime , Disp: , Rfl:   •  fluticasone (FLONASE) 50 mcg/act nasal spray, 50 mcg into each nostril daily, Disp: , Rfl:     No Known Allergies    Objective:  Vitals:    02/13/23 1116   BP: 152/82   Pulse: 96       Ortho Exam    Physical Exam    Left hand: Sutures removed  Incisions clean dry and intact  No erythema appreciated  Steri-Strips placed  Patient moves all fingers freely  Sensation intact median, ulnar, and radial nerve distributions  2+ radial pulse  This document was created using speech voice recognition software  Grammatical errors, random word insertions, pronoun errors, and incomplete sentences are an occasional consequence of this system due to software limitations, ambient noise, and hardware issues  Any formal questions or concerns about content, text, or information contained within the body of this dictation should be directly addressed to the provider for clarification

## 2023-02-13 NOTE — TELEPHONE ENCOUNTER
Called pt's insurance [phone] to provided additional information and appeal [place]     Spoke to Dolores Edmond and provided additional information reported that pt has tried and failed Copaxone and developed allergic reaction to genetic Yeceniafrancisco Salinas stated that she will send the new information to the clinical team and will get decision via fax-turn around time 24 hours

## 2023-02-14 ENCOUNTER — EVALUATION (OUTPATIENT)
Dept: OCCUPATIONAL THERAPY | Facility: CLINIC | Age: 53
End: 2023-02-14

## 2023-02-14 DIAGNOSIS — M18.12 PRIMARY OSTEOARTHRITIS OF FIRST CARPOMETACARPAL JOINT OF LEFT HAND: Primary | ICD-10-CM

## 2023-02-14 DIAGNOSIS — M18.12 OSTEOARTHRITIS OF CARPOMETACARPAL (CMC) JOINT OF LEFT THUMB, UNSPECIFIED OSTEOARTHRITIS TYPE: ICD-10-CM

## 2023-02-14 NOTE — PROGRESS NOTES
OT Evaluation     Today's date: 2023  Patient name: Susan Lucero  : 1970  MRN: [id number]  Referring provider: Ruma Carroll*  Dx:   Encounter Diagnosis     ICD-10-CM    1  Primary osteoarthritis of first carpometacarpal joint of left hand  M18 12                      Assessment  Assessment details: Patient is a 46 y o  RHD female who presents for OT IE and treatment for s/p Left CMC mini tight rope arthroplasty done 23  Patient reports having arthritis for years in her let hand  Patient recently underwent surgical intervention with Dr Mariana Pallas  Patient referred by Dr Mariana Pallas to initiate treatment including hand therapy  Impairments: abnormal or restricted ROM, impaired physical strength, lacks appropriate home exercise program and pain with function  Understanding of Dx/Px/POC: good   Prognosis: good    Goals  Short term goals 2-4 weeks  Establish HEP to enhance performance with ADLs  Improve active range of motion of LUE by 20  to assist with UE dressing  Long Term goals by discharge  Establish final home exercise program to enhance maximal functional level with ADLs  Achieve functional active range of motion of LUE for full return to household chores  Achieve functional strength of LUE for full return to high level ADLs            Plan  Patient would benefit from: OT eval and skilled occupational therapy  Planned modality interventions: thermotherapy: hydrocollator packs and cryotherapy  Planned therapy interventions: manual therapy, joint mobilization, strengthening, stretching, therapeutic activities, therapeutic exercise, home exercise program, graded exercise, graded activity, functional ROM exercises and flexibility  Duration in weeks: 8  Plan of Care beginning date: 2023  Plan of Care expiration date: 2023  Treatment plan discussed with: patient        Subjective Evaluation    History of Present Illness  Date of surgery: 2023  Mechanism of injury: Patient is a 46 y o  RHD female who presents for OT IE and treatment for s/p Left CMC mini tight rope arthroplasty done 23  Patient reports having arthritis for years in her let hand  Patient recently underwent surgical intervention with Dr Alex Sauceda  Patient referred by Dr Alex Sauceda to initiate treatment including hand therapy  Pain  Current pain rating: 3  At best pain rating: 3  At worst pain ratin  Quality: dull ache and throbbing    Patient Goals  Patient goals for therapy: decreased edema, decreased pain, increased motion, independence with ADLs/IADLs, return to sport/leisure activities and increased strength          Objective     Active Range of Motion     Left Wrist   Wrist flexion: 58 degrees   Wrist extension: 60 degrees   Radial deviation: 10 degrees   Ulnar deviation: 22 degrees     Right Wrist   Wrist flexion: 70 degrees   Wrist extension: 75 degrees   Radial deviation: 8 degrees   Ulnar deviation: 35 degrees     Left Thumb   Flexion     CMC: 40 degrees    MP: 30 degrees  Extension     CMC: 10 degrees    MP: 0 degrees    DIP: 20 degrees  Palmar Abduction     CMC: 35 degrees  Radial abduction    CMC: 35 degrees    Right Thumb   Flexion     CMC: 15    MP: 1    DIP: 50  Extension     CMC: 0    MP: 25    DIP: 20  Palmar Abduction    CMC: 44  Radial Abduction    CMC: 40               Auth Tracker  Auth Status Total   Visits  Expiration date Co-Insurance   Pending                                   Visit Tracker  Date                                                                                         PMHx:   has a past medical history of Ankle arthritis, Cancer ([address] ) (), Disease of thyroid gland, GERD (gastroesophageal reflux disease), History of transfusion, Hyperlipidemia, MS (congenital mitral stenosis), and Multiple sclerosis ([address] )      Precautions:        Manuals HEP                        Ther Ex     Education on HEP and dx x5 min               AROM thumb all planes x10 AROM wrist flex/ext/RD/UD x10    AROM forearm rotation x10    PROM wrist flex/ext x3 10 sec                                  Modalities     MHP 5 min            Assessment:   Patient tolerated session well  Patient demonstrates ROM deficits upon assessment today  Patient session focused on patient education on anatomy and physiology concerning current dx, techniques for decreasing deficits through HEP, and appropriate use of modalities  Patient educated on HEP to include ROM and modalities  with verbal instructions and handouts for patient reference  Patient educated on treatment plan at this time  Patient benefiting from skilled hand therapy OT to reduce deficits to improve independence with daily activities      Plan:   Focus on ROM to improve ability to complete daily activites with ease    POC 2/14/23 - 4/11/23

## 2023-02-16 NOTE — TELEPHONE ENCOUNTER
Called pt insurance again to follow up on the PA for Aleksandra ,spoke to Kathya Gavin she stated that was denied again because the dose 21 mg exceeds the recommended dose of 14 mg daily  Advised Kathya Gavin that is not correct and was never provided information that pt will be taking 21 mg daily,   Advised that pt will start with 7 mg po daily for 14 days and then they will continue with 14 mg daily ,she look into the previous PA and information and stated that what was written so she was not sure where they come up with the assumption that pt has been prescribed 21 mg po daily  Kathya Gavin sent this to the clinical team again to be review and stated that we will be notified via fax -turn around time is 24 hours

## 2023-02-22 ENCOUNTER — OFFICE VISIT (OUTPATIENT)
Dept: OCCUPATIONAL THERAPY | Facility: CLINIC | Age: 53
End: 2023-02-22

## 2023-02-22 DIAGNOSIS — M18.12 OSTEOARTHRITIS OF CARPOMETACARPAL (CMC) JOINT OF LEFT THUMB, UNSPECIFIED OSTEOARTHRITIS TYPE: ICD-10-CM

## 2023-02-22 DIAGNOSIS — M18.12 PRIMARY OSTEOARTHRITIS OF FIRST CARPOMETACARPAL JOINT OF LEFT HAND: Primary | ICD-10-CM

## 2023-02-22 NOTE — PROGRESS NOTES
Daily Note     Today's date: 2023  Patient name: Patt   : 1970  MRN: [id number]  Referring provider: Bjorn Morin*  Dx:   Encounter Diagnosis     ICD-10-CM    1  Primary osteoarthritis of first carpometacarpal joint of left hand  M18 12       2  Osteoarthritis of carpometacarpal [place]) joint of left thumb, unspecified osteoarthritis type  M18 12                      Subjective: Patient notes the thumb gets sore at times, but nothing abnormal        Objective: See treatment diary below         Auth Tracker  Auth Status Total   Visits  Expiration date Co-Insurance   approved                                  Visit Tracker  Date                                                                                        PMHx:   has a past medical history of Ankle arthritis, Cancer ([facility] ) (), Disease of thyroid gland, GERD (gastroesophageal reflux disease), History of transfusion, Hyperlipidemia, MS (congenital mitral stenosis), and Multiple sclerosis ([facility] )  Precautions:        Manuals HEP 2023                        Ther Ex     Education on HEP and dx  x5 min              AROM thumb all planes x x10   AROM wrist flex/ext/RD/UD x x10   AROM forearm rotation x x10   PROM wrist flex/ext x x3 10 sec                  There act     juxtaciser  x5   Iron balls x x10    Small pegs  x x10   Dice IHM x x20             Modalities     MHP 5 min  5 min           Assessment:   Patient tolerated session well  Session focused on HEP education, range of motion, and patient education to improve functional task performance with daily activities  Patient tolerated all TE/TA with no complaints  Patient progressing well towards goals  Patient benefiting from skilled hand therapy OT to reduce deficits to improve independence with daily activities  Plan:   Focus on ROM to improve ability to complete daily activites with ease    POC 23 - 23

## 2023-03-01 ENCOUNTER — OFFICE VISIT (OUTPATIENT)
Dept: OCCUPATIONAL THERAPY | Facility: CLINIC | Age: 53
End: 2023-03-01

## 2023-03-01 DIAGNOSIS — M18.12 PRIMARY OSTEOARTHRITIS OF FIRST CARPOMETACARPAL JOINT OF LEFT HAND: Primary | ICD-10-CM

## 2023-03-01 DIAGNOSIS — M18.12 OSTEOARTHRITIS OF CARPOMETACARPAL (CMC) JOINT OF LEFT THUMB, UNSPECIFIED OSTEOARTHRITIS TYPE: ICD-10-CM

## 2023-03-01 NOTE — PROGRESS NOTES
Daily Note     Today's date: 3/1/2023  Patient name: Sergei Lozano  : 1970  MRN: [id number]  Referring provider: Shruthi Pitt*  Dx:   Encounter Diagnosis     ICD-10-CM    1  Primary osteoarthritis of first carpometacarpal joint of left hand  M18 12       2  Osteoarthritis of carpometacarpal [place]) joint of left thumb, unspecified osteoarthritis type  M18 12                      Subjective: Patient notes she did laundry and her thumb feels very sore  Objective: See treatment diary below         Auth Tracker  Auth Status Total   Visits  Expiration date Co-Insurance   approved                                  Visit Tracker  Date 2/14 2/22 3/1            x                                                                          PMHx:   has a past medical history of Ankle arthritis, Cancer ([facility] ) (), Disease of thyroid gland, GERD (gastroesophageal reflux disease), History of transfusion, Hyperlipidemia, MS (congenital mitral stenosis), and Multiple sclerosis ([facility] )  Precautions:        Manuals HEP 3/1/2023                        Ther Ex     Education on HEP and dx  x5 min              AROM thumb all planes x x10   AROM wrist flex/ext/RD/UD x x10   AROM forearm rotation x x10   PROM wrist flex/ext x x3 10 sec                  There act     juxtaciser  x5   Iron balls x x10    Small pegs  x x10   Dice IHM x x20             Modalities     MHP 5 min  5 min           Assessment:   Patient tolerated session well  Session focused on HEP education, range of motion, and patient education to improve functional task performance with daily activities  Patient tolerated all TE/TA with no complaints  Patient progressing well towards goals  Patient benefiting from skilled hand therapy OT to reduce deficits to improve independence with daily activities  Plan:   Focus on ROM to improve ability to complete daily activites with ease    POC 23 - 23

## 2023-03-08 ENCOUNTER — OFFICE VISIT (OUTPATIENT)
Dept: OCCUPATIONAL THERAPY | Facility: CLINIC | Age: 53
End: 2023-03-08

## 2023-03-08 DIAGNOSIS — M18.12 OSTEOARTHRITIS OF CARPOMETACARPAL (CMC) JOINT OF LEFT THUMB, UNSPECIFIED OSTEOARTHRITIS TYPE: ICD-10-CM

## 2023-03-08 DIAGNOSIS — M18.12 PRIMARY OSTEOARTHRITIS OF FIRST CARPOMETACARPAL JOINT OF LEFT HAND: Primary | ICD-10-CM

## 2023-03-08 NOTE — PROGRESS NOTES
Daily Note     Today's date: 3/8/2023  Patient name: Homero Quinn  : 1970  MRN: [id number]  Referring provider: Ha Taylor*  Dx:   Encounter Diagnosis     ICD-10-CM    1  Primary osteoarthritis of first carpometacarpal joint of left hand  M18 12       2  Osteoarthritis of carpometacarpal (CMC) joint of left thumb, unspecified osteoarthritis type  M18 12                      Subjective: Patient offers no functional changes this date  Objective: See treatment diary below         Auth Tracker  Auth Status Total   Visits  Expiration date Co-Insurance   approved                                  Visit Tracker  Date 2/14 2/22 3/1 3/8           x                                                                          PMHx:   has a past medical history of Ankle arthritis, Cancer ([facility] ) (), Disease of thyroid gland, GERD (gastroesophageal reflux disease), History of transfusion, Hyperlipidemia, MS (congenital mitral stenosis), and Multiple sclerosis ([facility] )  Precautions:        Manuals HEP 3/8/2023                        Ther Ex     Education on HEP and dx  x5 min              AROM thumb all planes x x10   AROM wrist flex/ext/RD/UD x x10   AROM forearm rotation x x10   PROM wrist flex/ext x x3 10 sec   Flex bar  x10 red   Thumb depressor  x10   Digit ext band x x10 yellow   Sponge gripper x x10 blue                  There act     juxtaciser  x5   Iron balls x x10    Small pegs  x x10   Dice IHM x x20             Modalities     MHP 5 min  5 min           Assessment:   Patient tolerated session well  Session focused on HEP education, range of motion, and patient education to improve functional task performance with daily activities  Patient tolerated all TE/TA with no complaints  Patient progressing well towards goals  Patient benefiting from skilled hand therapy OT to reduce deficits to improve independence with daily activities          Plan:   Focus on ROM  And gentle strengthening to improve ability to complete daily activites with ease    POC 2/14/23 - 4/11/23

## 2023-03-15 ENCOUNTER — OFFICE VISIT (OUTPATIENT)
Dept: OBGYN CLINIC | Facility: CLINIC | Age: 53
End: 2023-03-15

## 2023-03-15 ENCOUNTER — OFFICE VISIT (OUTPATIENT)
Dept: OCCUPATIONAL THERAPY | Facility: CLINIC | Age: 53
End: 2023-03-15

## 2023-03-15 VITALS
BODY MASS INDEX: 34.21 KG/M2 | WEIGHT: 231 LBS | SYSTOLIC BLOOD PRESSURE: 131 MMHG | DIASTOLIC BLOOD PRESSURE: 81 MMHG | TEMPERATURE: 95.5 F | HEIGHT: 69 IN | HEART RATE: 82 BPM

## 2023-03-15 DIAGNOSIS — M18.12 OSTEOARTHRITIS OF CARPOMETACARPAL (CMC) JOINT OF LEFT THUMB, UNSPECIFIED OSTEOARTHRITIS TYPE: Primary | ICD-10-CM

## 2023-03-15 DIAGNOSIS — M18.12 PRIMARY OSTEOARTHRITIS OF FIRST CARPOMETACARPAL JOINT OF LEFT HAND: Primary | ICD-10-CM

## 2023-03-15 DIAGNOSIS — M18.12 OSTEOARTHRITIS OF CARPOMETACARPAL (CMC) JOINT OF LEFT THUMB, UNSPECIFIED OSTEOARTHRITIS TYPE: ICD-10-CM

## 2023-03-15 RX ORDER — MULTIVIT-MIN/IRON FUM/FOLIC AC 7.5 MG-4
1 TABLET ORAL DAILY
COMMUNITY

## 2023-03-15 NOTE — PROGRESS NOTES
Daily Note     Today's date: 3/15/2023  Patient name: Jae Kinney  : 1970  MRN: [id number]  Referring provider: Jimbo Saldivar  Dx:   Encounter Diagnosis     ICD-10-CM    1  Primary osteoarthritis of first carpometacarpal joint of left hand  M18 12       2  Osteoarthritis of carpometacarpal (CMC) joint of left thumb, unspecified osteoarthritis type  M18 12                      Subjective: Patient offers no functional changes this date  Objective: See treatment diary below         Auth Tracker  Auth Status Total   Visits  Expiration date Co-Insurance   approved                                  Visit Tracker  Date 2/14 2/22 3/1 3/8 3/15          x                                                                          PMHx:   has a past medical history of Ankle arthritis, Cancer ([facility] ) (), Disease of thyroid gland, GERD (gastroesophageal reflux disease), History of transfusion, Hyperlipidemia, MS (congenital mitral stenosis), and Multiple sclerosis ([facility] )  Precautions:        Manuals HEP 3/15/2023                        Ther Ex     Education on HEP and dx  x5 min              AROM thumb all planes x x10   AROM wrist flex/ext/RD/UD x x10   AROM forearm rotation x x10   PROM wrist flex/ext x x3 10 sec   Flex bar  x10 red   Thumb depressor  x10   Digit ext band x x10 yellow   Sponge gripper x x10 blue   PREs wrist flex/ext/RD x x10 2 lbs    Thumb depressor          There act     juxtaciser  x5   Iron balls x x10         Dice with pincher x x20 red             Modalities     MHP 5 min  5 min           Assessment:   Patient tolerated session well  Session focused on HEP education, range of motion, patient education, and gentle strengthening to improve functional task performance with daily activities  Patient tolerated all TE/TA with no complaints  Patient progressing well towards goals   Patient benefiting from skilled hand therapy OT to reduce deficits to improve independence with daily activities  Plan:   Focus on ROM  And gentle strengthening to improve ability to complete daily activites with ease    POC 2/14/23 - 4/11/23

## 2023-03-15 NOTE — PROGRESS NOTES
Assessment/Plan:  1  Osteoarthritis of carpometacarpal (CMC) joint of left thumb, unspecified osteoarthritis type            The patient is doing well and has minimal pain about her thumb at this point  She is already discontinued her thumb spica brace  She will continue physical therapy for another 6 weeks to work on strengthening  She would like to then transition to home exercises  She is aware she is should avoid any heavy lifting over the next 6 weeks  She may then gradually increase her activity as tolerated  She will follow-up as needed  Subjective:   Raul Taylor is a 46 y o  female who presents today for follow-up of her left thumb, status post [facility] arthroplasty performed on 1/30/23  She is doing well and notes minimal pain about the hand  She notes she discontinued her thumb spica brace about 2 weeks ago  She has bee compliant with occupation therapy  She notes good ROM and improving strength  She denies any paresthesias of the left upper extremity  Review of Systems   Constitutional: Negative  Negative for chills and fever  HENT: Negative  Negative for ear pain and sore throat  Eyes: Negative  Negative for pain and redness  Respiratory: Negative  Negative for shortness of breath and wheezing  Cardiovascular: Negative for chest pain and palpitations  Gastrointestinal: Negative  Negative for abdominal pain and blood in stool  Endocrine: Negative  Negative for polydipsia and polyuria  Genitourinary: Negative  Negative for difficulty urinating and dysuria  Musculoskeletal:        As noted in HPI   Skin: Negative  Negative for pallor and rash  Neurological: Negative  Negative for dizziness and numbness  Hematological: Negative  Negative for adenopathy  Does not bruise/bleed easily  Psychiatric/Behavioral: Negative  Negative for confusion and suicidal ideas           Past Medical History:   Diagnosis Date   • Ankle arthritis     right is having surgery soon for this   • Cancer [facility]) 2007    cervical   • Disease of thyroid gland     hypo   • GERD (gastroesophageal reflux disease)    • History of transfusion     age 3   • Hyperlipidemia    • MS (congenital mitral stenosis)     patient has multiple sclerosis    • Multiple sclerosis ([address] )        Past Surgical History:   Procedure Laterality Date   • CARPAL TUNNEL RELEASE Right    • COLONOSCOPY     • HYSTERECTOMY     • ME ARTHRP INTERPOS INTERCARPAL/METACARPAL JOINTS Right 10/27/2022    Procedure: ARTHROPLASTY THUMB Trinity Imam; CARPOMETACARPAL ARTHROPLASTY RIGHT THUMB;  Surgeon: Jh Lovell MD;  Location: [address] [place] [address];  Service: Orthopedics   • ME ARTHRP INTERPOS INTERCARPAL/METACARPAL JOINTS Left 2023    Procedure:  Thumb Carpometacarpal Arthroplasty;  Surgeon: Jh Lovell MD;  Location: [place] MAIN OR;  Service: Orthopedics   • ME NEUROPLASTY &/TRANSPOS MEDIAN NRV CARPAL Sloane Best Right 2021    Procedure: RELEASE CARPAL TUNNEL;  Surgeon: Trell Petty DO;  Location: [address] [place] [address];  Service: Orthopedics   • ME OSTEOTOMY CALCANEUS W/WO INTERNAL FIXATION Right 10/20/2021    Procedure: OSTEOTOMY CALCANEUS, Medial slide calcaneal osteotomy, FDL tendon transfer to navicular, Cotton osteotomy, Achilles lengthening, Wilson osteotomy;  Surgeon: Flora Davila MD;  Location: [facility] MAIN OR;  Service: Orthopedics   • SINUS SURGERY     • WRIST SURGERY Left        Family History   Problem Relation Age of Onset   • No Known Problems Mother        Social History     Occupational History   • Not on file   Tobacco Use   • Smoking status: Former     Packs/day: 0 50     Years: 45 00     Pack years: 22 50     Types: Cigarettes     Quit date: 4/10/2020     Years since quittin 9   • Smokeless tobacco: Never   Vaping Use   • Vaping Use: Never used   Substance and Sexual Activity   • Alcohol use: Yes     Comment: 1-2 drinks once per week   • Drug use: Never   • Sexual activity: Yes         Current Outpatient Medications:   •  azelastine (ASTELIN) 0 1 % nasal spray, 2 sprays into each nostril daily, Disp: , Rfl:   •  cholecalciferol (VITAMIN D3) 1,000 units tablet, Take 1,000 Units by mouth daily, Disp: , Rfl:   •  cyclobenzaprine (FLEXERIL) 10 mg tablet, Take 10 mg by mouth daily at bedtime , Disp: , Rfl:   •  estradiol (ESTRACE) 1 mg tablet, Take 2 mg by mouth daily at bedtime  , Disp: , Rfl:   •  famotidine (PEPCID) 10 mg tablet, Take 10 mg by mouth daily at bedtime, Disp: , Rfl:   •  fexofenadine (ALLEGRA) 60 MG tablet, Take 60 mg by mouth daily at bedtime , Disp: , Rfl:   •  gabapentin (NEURONTIN) 300 mg capsule, TAKE 1 CAPSULE (300 MG TOTAL) BY MOUTH TWO (TWO) TIMES A DAY, Disp: 60 capsule, Rfl: 2  •  levothyroxine 50 mcg tablet, Take 50 mcg by mouth daily, Disp: , Rfl:   •  losartan (COZAAR) 25 mg tablet, Take 25 mg by mouth daily at bedtime, Disp: , Rfl:   •  montelukast (SINGULAIR) 10 mg tablet, Take 10 mg by mouth daily at bedtime , Disp: , Rfl:   •  Multiple Vitamins-Minerals (multivitamin with minerals) tablet, Take 1 tablet by mouth daily, Disp: , Rfl:   •  rosuvastatin (CRESTOR) 10 MG tablet, Take 10 mg by mouth daily at bedtime , Disp: , Rfl:   •  Calcium Ascorbate (VITAMIN C) 500 mg tablet, Take 500 mg by mouth daily (Patient not taking: Reported on 3/15/2023), Disp: , Rfl:   •  fluticasone (FLONASE) 50 mcg/act nasal spray, 50 mcg into each nostril daily, Disp: , Rfl:   •  oxyCODONE (Roxicodone) 5 immediate release tablet, Take 1 tablet (5 mg total) by mouth every 4 (four) hours as needed for moderate pain for up to 15 doses Max Daily Amount: 30 mg (Patient not taking: Reported on 3/15/2023), Disp: 15 tablet, Rfl: 0  •  Teriflunomide (Aubagio) 14 MG TABS, Take 1 tablet (14 mg total) by mouth in the morning (Patient not taking: Reported on 3/15/2023), Disp: 30 tablet, Rfl: 3  •  Teriflunomide (Aubagio) 7 MG TABS, Take 1 tablet (7 mg total) by mouth in the morning (Patient not taking: Reported on 3/15/2023), Disp: 14 tablet, Rfl: 0    No Known Allergies    Objective:  Vitals:    03/15/23 1042   BP: 131/81   Pulse: 82   Temp: (!) 95 5 °F (35 3 °C)       Ortho Exam    Physical Exam    Left thumb: Well healed surgical scars  No tenderness  Near full ROM of wrist and thumb  Sensation intact left upper extremity  2+ radial pulse  This document was created using speech voice recognition software  Grammatical errors, random word insertions, pronoun errors, and incomplete sentences are an occasional consequence of this system due to software limitations, ambient noise, and hardware issues  Any formal questions or concerns about content, text, or information contained within the body of this dictation should be directly addressed to the provider for clarification

## 2023-03-22 ENCOUNTER — OFFICE VISIT (OUTPATIENT)
Dept: OCCUPATIONAL THERAPY | Facility: CLINIC | Age: 53
End: 2023-03-22

## 2023-03-22 DIAGNOSIS — M18.12 PRIMARY OSTEOARTHRITIS OF FIRST CARPOMETACARPAL JOINT OF LEFT HAND: Primary | ICD-10-CM

## 2023-03-22 DIAGNOSIS — M18.12 OSTEOARTHRITIS OF CARPOMETACARPAL (CMC) JOINT OF LEFT THUMB, UNSPECIFIED OSTEOARTHRITIS TYPE: ICD-10-CM

## 2023-03-22 NOTE — PROGRESS NOTES
Daily Note     Today's date: 3/22/2023  Patient name: Marianne Mcelroy  : 1970  MRN: [id number]  Referring provider: Myrtle Waters*  Dx:   Encounter Diagnosis     ICD-10-CM    1  Primary osteoarthritis of first carpometacarpal joint of left hand  M18 12       2  Osteoarthritis of carpometacarpal (CMC) joint of left thumb, unspecified osteoarthritis type  M18 12                      Subjective: Patient offers no functional changes this date  Objective: See treatment diary below         Auth Tracker  Auth Status Total   Visits  Expiration date Co-Insurance   approved                                  Visit Tracker  Date 2/14 2/22 3/1 3/8 3/15 3/22         x                                                                          PMHx:   has a past medical history of Ankle arthritis, Cancer ([facility] [address] ) (), Disease of thyroid gland, GERD (gastroesophageal reflux disease), History of transfusion, Hyperlipidemia, MS (congenital mitral stenosis), and Multiple sclerosis ([facility] [address] )  Precautions:        Manuals HEP 3/22/2023                        Ther Ex     Education on HEP and dx  x5 min              AROM thumb all planes x x10   AROM wrist flex/ext/RD/UD x x10   AROM forearm rotation x x10   PROM wrist flex/ext x x3 10 sec   Flex bar  x10 red   Thumb depressor  x10   Digit ext band x x10 red   Sponge gripper x x10 blue   PREs wrist flex/ext/RD x x10 3 lbs    Thumb depressor  x10        There act     juxtaciser  x5   Iron balls x x10         Dice with pincher x x20 red             Modalities     MHP 5 min  5 min           Assessment:   Patient tolerated session well  Session focused on HEP education, range of motion, patient education, and gentle strengthening to improve functional task performance with daily activities  Patient tolerated all TE/TA with no complaints  Patient progressing well towards goals   Patient benefiting from skilled hand therapy OT to reduce deficits to improve independence with daily activities  Plan:   Focus on ROM  And gentle strengthening to improve ability to complete daily activites with ease    POC 2/14/23 - 4/11/23

## 2023-04-05 ENCOUNTER — OFFICE VISIT (OUTPATIENT)
Dept: OCCUPATIONAL THERAPY | Facility: CLINIC | Age: 53
End: 2023-04-05

## 2023-04-05 DIAGNOSIS — M18.12 PRIMARY OSTEOARTHRITIS OF FIRST CARPOMETACARPAL JOINT OF LEFT HAND: Primary | ICD-10-CM

## 2023-04-05 DIAGNOSIS — M18.12 OSTEOARTHRITIS OF CARPOMETACARPAL (CMC) JOINT OF LEFT THUMB, UNSPECIFIED OSTEOARTHRITIS TYPE: ICD-10-CM

## 2023-04-05 NOTE — PROGRESS NOTES
Daily Note     Today's date: 2023  Patient name: Chet Gutierrez  : 1970  MRN: [id number]  Referring provider: Angela Espinoza*  Dx:   Encounter Diagnosis     ICD-10-CM    1  Primary osteoarthritis of first carpometacarpal joint of left hand  M18 12       2  Osteoarthritis of carpometacarpal (CMC) joint of left thumb, unspecified osteoarthritis type  M18 12                      Subjective: Patient offers no functional changes this date  Objective: See treatment diary below         Auth Tracker  Auth Status Total   Visits  Expiration date Co-Insurance   approved                                  Visit Tracker  Date 2/14 2/22 3/1 3/8 3/15 3/22    4/5     x                                                                          PMHx:   has a past medical history of Ankle arthritis, Cancer ([facility] [address] ) (), Disease of thyroid gland, GERD (gastroesophageal reflux disease), History of transfusion, Hyperlipidemia, MS (congenital mitral stenosis), and Multiple sclerosis ([facility] [address] )  Precautions:        Manuals HEP 2023                        Ther Ex     Education on HEP and dx  x5 min              AROM thumb all planes x x10   AROM wrist flex/ext/RD/UD x x10   AROM forearm rotation x x10   PROM wrist flex/ext x x3 10 sec   Flex bar  x10 green   Thumb depressor  x10   Digit ext band x x10 red   Sponge gripper x x10 blue   PREs wrist flex/ext/RD x x10 4 lbs    Thumb depressor  x10        There act     juxtaciser  x5   Wrist rotator  x10    gripper x x10 level 4    Dice with pincher x x20 red             Modalities     MHP 5 min  5 min           Assessment:   Patient tolerated session well  Session focused on HEP education, range of motion, patient education, and  strengthening to improve functional task performance with daily activities  Patient tolerated all TE/TA with no complaints  Patient progressing well towards goals   Patient benefiting from skilled hand therapy OT to reduce deficits to improve independence with daily activities  Plan:   Focus on ROM and strengthening to improve ability to complete daily activites with ease    POC 2/14/23 - 4/11/23

## 2023-04-26 ENCOUNTER — OFFICE VISIT (OUTPATIENT)
Dept: OCCUPATIONAL THERAPY | Facility: CLINIC | Age: 53
End: 2023-04-26

## 2023-04-26 DIAGNOSIS — M18.12 OSTEOARTHRITIS OF CARPOMETACARPAL (CMC) JOINT OF LEFT THUMB, UNSPECIFIED OSTEOARTHRITIS TYPE: ICD-10-CM

## 2023-04-26 DIAGNOSIS — M18.12 PRIMARY OSTEOARTHRITIS OF FIRST CARPOMETACARPAL JOINT OF LEFT HAND: Primary | ICD-10-CM

## 2023-04-26 NOTE — PROGRESS NOTES
"Daily Note     Today's date: 2023  Patient name: Chet Gutierrez  : 1970  MRN: [id number]  Referring provider: Angela Espinoza*  Dx:   Encounter Diagnosis     ICD-10-CM    1  Primary osteoarthritis of first carpometacarpal joint of left hand  M18 12       2  Osteoarthritis of carpometacarpal [place]) joint of left thumb, unspecified osteoarthritis type  M18 12                      Subjective: \"It's still hard for me to twist and open lids  \"      Objective: See treatment diary below      Assessment: Tolerated treatment well  Pt c/o scar sensitivity  Desensitization exercises edu  Patient would benefit from continued OT    Auth Tracker  Auth Status Total   Visits  Expiration date Co-Insurance   approved                                                     Visit Tracker  Date 2/14 2/22 3/1 3/8 3/15 3/22     4/5 4/19  RE       x                                                                                                                               PMHx:   has a past medical history of Ankle arthritis, Cancer ([facility] [address] ) (), Disease of thyroid gland, GERD (gastroesophageal reflux disease), History of transfusion, Hyperlipidemia, MS (congenital mitral stenosis), and Multiple sclerosis ([facility] )     Precautions:         Manuals HEP 2023                                    Ther Ex       Education on HEP and dx   x5 min                    AROM thumb all planes x x10   AROM wrist flex/ext/RD/UD x x10   AROM forearm rotation x x10   PROM wrist flex/ext x x3 10 sec   Flex bar   x10 green   Thumb depressor   x10   Digit ext band x x10 red   Sponge gripper x x10 blue   PREs wrist flex/ext/RD x x10 4 lbs                    There act       juxtaciser x x5   Wrist rotator x x10    gripper x x10 level 4    Dice with pincher x x20 red                   Modalities       MHP 5 min  5 min               Assessment:   Patient tolerated session well   Session focused on HEP education, range of motion, patient " education, and  strengthening to improve functional task performance with daily activities  Patient tolerated all TE/TA with no complaints  Patient progressing well towards goals  Patient benefiting from skilled hand therapy OT to reduce deficits to improve independence with daily activities          Plan:   Focus on ROM and strengthening to improve ability to complete daily activites with ease   POC 4/19/23 - 6/14/23

## 2023-05-03 ENCOUNTER — OFFICE VISIT (OUTPATIENT)
Dept: OCCUPATIONAL THERAPY | Facility: CLINIC | Age: 53
End: 2023-05-03

## 2023-05-03 DIAGNOSIS — M18.12 OSTEOARTHRITIS OF CARPOMETACARPAL (CMC) JOINT OF LEFT THUMB, UNSPECIFIED OSTEOARTHRITIS TYPE: ICD-10-CM

## 2023-05-03 DIAGNOSIS — M18.12 PRIMARY OSTEOARTHRITIS OF FIRST CARPOMETACARPAL JOINT OF LEFT HAND: Primary | ICD-10-CM

## 2023-05-03 NOTE — PROGRESS NOTES
"Daily Note     Today's date: 5/3/2023  Patient name: Chantal Cohen  : 1970  MRN: [id number]  Referring provider: Hamilton Jaimes*  Dx:   Encounter Diagnosis     ICD-10-CM    1  Primary osteoarthritis of first carpometacarpal joint of left hand  M18 12       2  Osteoarthritis of carpometacarpal [place]) joint of left thumb, unspecified osteoarthritis type  M18 12                      Subjective: \"I'm working on the strength  \"      Objective: See treatment diary below      Assessment: Tolerated treatment well  Pt c/o scar sensitivity  Desensitization exercises edu  Patient would benefit from continued OT    Auth Tracker  Auth Status Total   Visits  Expiration date Co-Insurance   approved                                                     Visit Tracker  Date 2/14 2/22 3/1 3/8 3/15 3/22     4/5 4/19  RE  4/26  5/3   x                                                                                                                               PMHx:   has a past medical history of Ankle arthritis, Cancer ([facility] [address] ) (), Disease of thyroid gland, GERD (gastroesophageal reflux disease), History of transfusion, Hyperlipidemia, MS (congenital mitral stenosis), and Multiple sclerosis ([facility] [address] )     Precautions:         Manuals HEP 5/3/23                                   Ther Ex       Education on HEP and dx   x5 min                    AROM thumb all planes x x10   AROM wrist flex/ext/RD/UD x x10   AROM forearm rotation x x10   PROM wrist flex/ext x x3 10 sec   Flex bar   x10 green   Thumb depressor   x10   Digit ext band x x10 red   Sponge gripper x x10 blue   PREs wrist flex/ext/RD x x10 4 lbs                    There act       juxtaciser x x5   Wrist rotator x x10    gripper x x10 level 4    Dice with pincher x x20 red                   Modalities       MHP 5 min  5 min               Assessment:   Patient tolerated session well   Session focused on HEP education, range of motion, patient education, and  " strengthening to improve functional task performance with daily activities  Patient tolerated all TE/TA with no complaints  Patient progressing well towards goals  Patient benefiting from skilled hand therapy OT to reduce deficits to improve independence with daily activities          Plan:   Focus on ROM and strengthening to improve ability to complete daily activites with ease   POC 4/19/23 - 6/14/23

## 2023-05-04 ENCOUNTER — TELEPHONE (OUTPATIENT)
Dept: NEUROLOGY | Facility: CLINIC | Age: 53
End: 2023-05-04

## 2023-05-10 ENCOUNTER — EVALUATION (OUTPATIENT)
Dept: OCCUPATIONAL THERAPY | Facility: CLINIC | Age: 53
End: 2023-05-10

## 2023-05-10 DIAGNOSIS — M18.12 OSTEOARTHRITIS OF CARPOMETACARPAL (CMC) JOINT OF LEFT THUMB, UNSPECIFIED OSTEOARTHRITIS TYPE: ICD-10-CM

## 2023-05-10 DIAGNOSIS — M18.12 PRIMARY OSTEOARTHRITIS OF FIRST CARPOMETACARPAL JOINT OF LEFT HAND: Primary | ICD-10-CM

## 2023-05-10 NOTE — PROGRESS NOTES
OT Re-Evaluation     Today's date: 5/10/2023  Patient name: Hao Gutiérrez  : 1970  MRN: [id number]  Referring provider: Flaco Little*  Dx:   Encounter Diagnosis     ICD-10-CM    1  Primary osteoarthritis of first carpometacarpal joint of left hand  M18 12       2  Osteoarthritis of carpometacarpal [place]) joint of left thumb, unspecified osteoarthritis type  M18 12                      Assessment  Assessment details: 5/10/23- Pt has been participating and progressing in skilled OT tx and toward established goals  Pt has demo inc ROM of B/L thumbs as well as inc lateral  pinch strength of R and L thumb  Pt reports inc ability in ADLs with thumb and making F gains  But pt still reports pain/discomfort of thumbs during ADLs, as well as weakness for certain tasks such as opening bottles, lids, door knobs, and keys  Pt also demo dec  strength form previous re-eval  Pt has bene progressing but deficits still apparently affecting independence and pt would benefit from continued skilled OT tx,     Patient re-evaluation done this this date with noted improvement in AROM  Patient strength also assessed this date with noted deficits in LUE compared to RUE at this time  Patient would benefit from continued skilled OT to maximize ROM and strength at this time to improve functional use with ADLs  Patient is a 46 y o  RHD female who presents for OT IE and treatment for s/p Left CMC mini tight rope arthroplasty done 23  Patient reports having arthritis for years in her let hand  Patient recently underwent surgical intervention with Dr Andreina Davila  Patient referred by Dr Andreina Davila to initiate treatment including hand therapy  Impairments: abnormal or restricted ROM, impaired physical strength, lacks appropriate home exercise program and pain with function  Understanding of Dx/Px/POC: good   Prognosis: good    Goals  Short term goals 2-4 weeks  Establish HEP to enhance performance with ADLs  MET    Improve active range of motion of LUE by 20  to assist with UE dressing  MET    Improve  and pinch strength by 10 lbs  progressing      Long Term goals by discharge  Establish final home exercise program to enhance maximal functional level with ADLs  Partially met    Achieve functional active range of motion of LUE for full return to household chores  Partially met      Achieve functional strength of LUE for full return to high level ADLs  Partially met  Plan  Patient would benefit from: OT eval and skilled occupational therapy  Planned modality interventions: thermotherapy: hydrocollator packs and cryotherapy  Planned therapy interventions: manual therapy, joint mobilization, strengthening, stretching, therapeutic activities, therapeutic exercise, home exercise program, graded exercise, graded activity, functional ROM exercises and flexibility  Duration in weeks: 6  Plan of Care beginning date: 5/10/2023  Plan of Care expiration date: 2023  Treatment plan discussed with: patient        Subjective Evaluation    History of Present Illness  Date of surgery: 2023  Mechanism of injury: Patient is a 46 y o  RHD female who presents for OT IE and treatment for s/p Left CMC mini tight rope arthroplasty done 23  Patient reports having arthritis for years in her let hand  Patient recently underwent surgical intervention with Dr Rao Alexander  Patient referred by Dr Rao Alexander to initiate treatment including hand therapy      Pain  Current pain ratin  At best pain ratin  At worst pain ratin  Quality: dull ache and throbbing  Progression: improved    Patient Goals  Patient goals for therapy: decreased edema, decreased pain, increased motion, independence with ADLs/IADLs, return to sport/leisure activities and increased strength          Objective     Active Range of Motion     Left Wrist   Wrist flexion: 70 degrees   Wrist extension: 75 degrees   Radial deviation: 15 degrees   Ulnar deviation: 40 degrees     Right Wrist   Wrist flexion: 70 degrees   Wrist extension: 75 degrees   Radial deviation: 8 degrees   Ulnar deviation: 35 degrees     Left Thumb   Flexion     CMC: 40 degrees    MP: 50 degrees    DIP: 70 degrees  Extension     CMC: 10 degrees    MP: 0 degrees    DIP: 20 degrees  Palmar Abduction     CMC: 45 degrees  Radial abduction    CMC: 45 degrees    Right Thumb   Flexion     CMC: 40    MP: 30    DIP: 75  Extension     CMC: 0    MP: 25    DIP: 20  Palmar Abduction    CMC: 50  Radial Abduction    CMC: 60    Strength/Myotome Testing     Left Wrist/Hand      (2nd hand position)     Trial 1: 50    Thumb Strength  Key/Lateral Pinch     Trial 1: 10    Right Wrist/Hand      (2nd hand position)     Trial 1: 60    Thumb Strength   Key/Lateral Pinch     Trial 1: 16    Additional Strength Details  5/10/23- dec strength in R hand; +5 inc L and R lateral pinch;                         Daily Note     Today's date: 5/10/2023  Patient name: Raissa South  : 1970  MRN: [id number]  Referring provider: Wesley Thomas*  Dx:   Encounter Diagnosis     ICD-10-CM    1  Primary osteoarthritis of first carpometacarpal joint of left hand  M18 12       2  Osteoarthritis of carpometacarpal (CMC) joint of left thumb, unspecified osteoarthritis type  M18 12                      Subjective: Patient offers no functional changes this date          Objective: See treatment diary below         Auth Tracker  Auth Status Total   Visits  Expiration date Co-Insurance   approved                                  Visit Tracker  Date 2/14 2/22 3/1 3/8 3/15 3/22    4/5 4/19  RE 4/26 5/3 5/10 RE x                                                                          PMHx:   has a past medical history of Ankle arthritis, Cancer ([facility] [address] ) (), Disease of thyroid gland, GERD (gastroesophageal reflux disease), History of transfusion, Hyperlipidemia, MS (congenital mitral stenosis), and Multiple sclerosis ([facility] [address] )  Precautions:        Manuals HEP 5/10/2023                        Ther Ex     Education on HEP and dx  x5 min              AROM thumb all planes x x10   AROM wrist flex/ext/RD/UD x x10   AROM forearm rotation x x10   PROM wrist flex/ext x x3 10 sec   Flex bar  x10 green   Thumb depressor  x10   Digit ext band x x10 red   Sponge gripper x x10 blue   PREs wrist flex/ext/RD x x10 4 lbs              There act     juxtaciser x x5   Wrist rotator x x10    gripper x x10 level 4    Dice with pincher x x20 red and green and power web              Modalities     MHP 5 min  5 min           Assessment:   Patient tolerated session well  Session focused on HEP education, range of motion, patient education, and  strengthening to improve functional task performance with daily activities  Patient tolerated all TE/TA with no complaints  Patient progressing well towards goals  Patient benefiting from skilled hand therapy OT to reduce deficits to improve independence with daily activities  Plan:   Focus on ROM and strengthening to improve ability to complete daily activites with ease   POC 5/10/23 - 6/21/23

## 2023-05-10 NOTE — LETTER
May 10, 2023    Gus Virk MD    Patient: Tisha Villareal   YOB: 1970   Date of Visit: 5/10/2023     Encounter Diagnosis     ICD-10-CM    1  Primary osteoarthritis of first carpometacarpal joint of left hand  M18 12       2  Osteoarthritis of carpometacarpal [place]) joint of left thumb, unspecified osteoarthritis type  M18 12           Dear Dr Kiara Grigsby: Thank you for your recent referral of Tisha Villareal  Please review the attached evaluation summary from Roxi's recent visit  Please verify that you agree with the plan of care by signing the attached order  If you have any questions or concerns, please do not hesitate to call  I sincerely appreciate the opportunity to share in the care of one of your patients and hope to have another opportunity to work with you in the near future  Sincerely,    Terry Taveras OT      Referring Provider:     I certify that I have read the below Plan of Care and certify the need for these services furnished under this plan of treatment while under my care  Gus Virk MD  [facility] [place]        OT Re-Evaluation     Today's date: 5/10/2023  Patient name: Tisha Villareal  : 1970  MRN: [id number]  Referring provider: Arvin Jaimes*  Dx:   Encounter Diagnosis     ICD-10-CM    1  Primary osteoarthritis of first carpometacarpal joint of left hand  M18 12       2  Osteoarthritis of carpometacarpal [place]) joint of left thumb, unspecified osteoarthritis type  M18 12                      Assessment  Assessment details: 5/10/23- Pt has been participating and progressing in skilled OT tx and toward established goals  Pt has demo inc ROM of B/L thumbs as well as inc lateral  pinch strength of R and L thumb  Pt reports inc ability in ADLs with thumb and making F gains   But pt still reports pain/discomfort of thumbs during ADLs, as well as weakness for certain tasks such as opening bottles, lids, door knobs, and keys  Pt also demo dec  strength form previous re-eval  Pt has bene progressing but deficits still apparently affecting independence and pt would benefit from continued skilled OT tx,     Patient re-evaluation done this this date with noted improvement in AROM  Patient strength also assessed this date with noted deficits in LUE compared to RUE at this time  Patient would benefit from continued skilled OT to maximize ROM and strength at this time to improve functional use with ADLs  Patient is a 46 y o  RHD female who presents for OT IE and treatment for s/p Left CMC mini tight rope arthroplasty done 1/30/23  Patient reports having arthritis for years in her let hand  Patient recently underwent surgical intervention with Dr Vianey Blunt  Patient referred by Dr Vianey Blunt to initiate treatment including hand therapy  Impairments: abnormal or restricted ROM, impaired physical strength, lacks appropriate home exercise program and pain with function  Understanding of Dx/Px/POC: good   Prognosis: good    Goals  Short term goals 2-4 weeks  Establish HEP to enhance performance with ADLs  MET    Improve active range of motion of LUE by 20  to assist with UE dressing  MET    Improve  and pinch strength by 10 lbs  progressing      Long Term goals by discharge  Establish final home exercise program to enhance maximal functional level with ADLs  Partially met    Achieve functional active range of motion of LUE for full return to household chores  Partially met      Achieve functional strength of LUE for full return to high level ADLs  Partially met             Plan  Patient would benefit from: OT eval and skilled occupational therapy  Planned modality interventions: thermotherapy: hydrocollator packs and cryotherapy  Planned therapy interventions: manual therapy, joint mobilization, strengthening, stretching, therapeutic activities, therapeutic exercise, home exercise program, graded exercise, graded activity, functional ROM exercises and flexibility  Duration in weeks: 6  Plan of Care beginning date: 5/10/2023  Plan of Care expiration date: 2023  Treatment plan discussed with: patient        Subjective Evaluation    History of Present Illness  Date of surgery: 2023  Mechanism of injury: Patient is a 46 y o  RHD female who presents for OT IE and treatment for s/p Left CMC mini tight rope arthroplasty done 23  Patient reports having arthritis for years in her let hand  Patient recently underwent surgical intervention with Dr Dean Valdovinos  Patient referred by Dr Dean Valdovinos to initiate treatment including hand therapy      Pain  Current pain ratin  At best pain ratin  At worst pain ratin  Quality: dull ache and throbbing  Progression: improved    Patient Goals  Patient goals for therapy: decreased edema, decreased pain, increased motion, independence with ADLs/IADLs, return to sport/leisure activities and increased strength          Objective     Active Range of Motion     Left Wrist   Wrist flexion: 70 degrees   Wrist extension: 75 degrees   Radial deviation: 15 degrees   Ulnar deviation: 40 degrees     Right Wrist   Wrist flexion: 70 degrees   Wrist extension: 75 degrees   Radial deviation: 8 degrees   Ulnar deviation: 35 degrees     Left Thumb   Flexion     CMC: 40 degrees    MP: 50 degrees    DIP: 70 degrees  Extension     CMC: 10 degrees    MP: 0 degrees    DIP: 20 degrees  Palmar Abduction     CMC: 45 degrees  Radial abduction    CMC: 45 degrees    Right Thumb   Flexion     CMC: 40    MP: 30    DIP: 75  Extension     CMC: 0    MP: 25    DIP: 20  Palmar Abduction    CMC: 50  Radial Abduction    CMC: 60    Strength/Myotome Testing     Left Wrist/Hand      (2nd hand position)     Trial 1: 50    Thumb Strength  Key/Lateral Pinch     Trial 1: 10    Right Wrist/Hand      (2nd hand position)     Trial 1: 60    Thumb Strength   Key/Lateral Pinch     Trial 1: 16    Additional Strength Details  5/10/23- dec strength in R hand; +5 inc L and R lateral pinch;                       Daily Note     Today's date: 5/10/2023  Patient name: Juan Jose Myers  : 1970  MRN: [id number]  Referring provider: Joelle Kaufman  Dx:   Encounter Diagnosis     ICD-10-CM    1  Primary osteoarthritis of first carpometacarpal joint of left hand  M18 12       2  Osteoarthritis of carpometacarpal (CMC) joint of left thumb, unspecified osteoarthritis type  M18 12                      Subjective: Patient offers no functional changes this date  Objective: See treatment diary below        Auth Tracker  Auth Status Total   Visits  Expiration date Co-Insurance   approved                                  Visit Tracker  Date 2/14 2/22 3/1 3/8 3/15 3/22    4/5 4/19  RE 4/26 5/3 5/10 RE x                                                                          PMHx:   has a past medical history of Ankle arthritis, Cancer ([facility] [address] ) (), Disease of thyroid gland, GERD (gastroesophageal reflux disease), History of transfusion, Hyperlipidemia, MS (congenital mitral stenosis), and Multiple sclerosis ([facility] [address] )  Precautions:        Manuals HEP 5/10/2023                        Ther Ex     Education on HEP and dx  x5 min              AROM thumb all planes x x10   AROM wrist flex/ext/RD/UD x x10   AROM forearm rotation x x10   PROM wrist flex/ext x x3 10 sec   Flex bar  x10 green   Thumb depressor  x10   Digit ext band x x10 red   Sponge gripper x x10 blue   PREs wrist flex/ext/RD x x10 4 lbs              There act     juxtaciser x x5   Wrist rotator x x10    gripper x x10 level 4    Dice with pincher x x20 red and green and power web              Modalities     MHP 5 min  5 min           Assessment:   Patient tolerated session well  Session focused on HEP education, range of motion, patient education, and  strengthening to improve functional task performance with daily activities   Patient tolerated all TE/TA with no complaints  Patient progressing well towards goals  Patient benefiting from skilled hand therapy OT to reduce deficits to improve independence with daily activities  Plan:   Focus on ROM and strengthening to improve ability to complete daily activites with ease   POC 5/10/23 - 6/21/23

## 2023-05-16 ENCOUNTER — OFFICE VISIT (OUTPATIENT)
Dept: NEUROLOGY | Facility: CLINIC | Age: 53
End: 2023-05-16

## 2023-05-16 VITALS
TEMPERATURE: 97 F | DIASTOLIC BLOOD PRESSURE: 84 MMHG | SYSTOLIC BLOOD PRESSURE: 129 MMHG | HEART RATE: 85 BPM | HEIGHT: 69 IN | OXYGEN SATURATION: 96 % | WEIGHT: 232 LBS | BODY MASS INDEX: 34.36 KG/M2

## 2023-05-16 DIAGNOSIS — G62.9 NEUROPATHY: ICD-10-CM

## 2023-05-16 DIAGNOSIS — G35 MULTIPLE SCLEROSIS (HCC): ICD-10-CM

## 2023-05-16 DIAGNOSIS — D32.9 MENINGIOMA (HCC): Primary | ICD-10-CM

## 2023-05-16 DIAGNOSIS — R53.83 FATIGUE: ICD-10-CM

## 2023-05-16 RX ORDER — TERIFLUNOMIDE 7 MG/1
7 TABLET, FILM COATED ORAL DAILY
Qty: 14 TABLET | Refills: 0 | Status: SHIPPED | OUTPATIENT
Start: 2023-05-16

## 2023-05-16 RX ORDER — GABAPENTIN 300 MG/1
CAPSULE ORAL
Qty: 90 CAPSULE | Refills: 4 | Status: SHIPPED | OUTPATIENT
Start: 2023-05-16

## 2023-05-16 RX ORDER — MODAFINIL 100 MG/1
100 TABLET ORAL DAILY
Qty: 30 TABLET | Refills: 3 | Status: SHIPPED | OUTPATIENT
Start: 2023-05-16

## 2023-05-16 RX ORDER — TERIFLUNOMIDE 14 MG/1
14 TABLET, FILM COATED ORAL DAILY
Qty: 30 TABLET | Refills: 3 | Status: SHIPPED | OUTPATIENT
Start: 2023-05-16

## 2023-05-16 NOTE — PROGRESS NOTES
Return NeuroOutpatient Note        Annelise Haji  [id number]  85 y o   1970       Multiple Sclerosis        History obtained from:  Patient     HPI/Subjective:    Annelise Haji is a 45 yo F with PMH of MS presents as f/u  She was diagnosed in 2003  Per my previous history, patient took copaxone for 2 years  She was getting bruises at each site and eventually tried natural, herbal therapy  She was noticing forgetfulness, word finding difficulty  She was thought to have Lyme and still wasn't getting better after abx so imaging finally showed demyelinating lesions  She had LP done to confirm it  Initially, she had difficulty writing with right hand, vertigo which are her typical symptoms  With flare ups, she gets crawling sensation  Stress, physical exhaustion can be triggers  She can get fatigue easily  She avoids uneven surfaces  She used to get paresthesia but hasn't in a long time  Stress can make her eyes fatigued  Patient had MRI brain IAC in sept of 2021 which revealed prior lesions but no significant change  We had repeat MRI brain in feb of 2023 which revealed non specific white matter lesions likely from [address] with no active lesions  She has stable 0 8cm anterior falcine meningioma  At previous visit, she had expressed trying oral DMD  We had prescribed Aubagio but her pharmacy is not able to get it so will switch to another one today  Physically, she's independent but can stumble if she quickly turns  She may need to hold onto surface if turning  She was on provigil for fatigue  She's not on it anymore  she does feel she could use it again  She's on neurontin 100mg bid  She takes mobic as needed       Past Medical History:   Diagnosis Date   • Ankle arthritis     right is having surgery soon for this   • Cancer [facility]) 2007    cervical   • Disease of thyroid gland     hypo   • GERD (gastroesophageal reflux disease)    • History of transfusion     age 3   • Hyperlipidemia    • MS (congenital mitral stenosis)     patient has multiple sclerosis    • Multiple sclerosis ([facility] [address] )      Social History     Socioeconomic History   • Marital status:      Spouse name: Not on file   • Number of children: Not on file   • Years of education: Not on file   • Highest education level: Not on file   Occupational History   • Not on file   Tobacco Use   • Smoking status: Former     Packs/day: 0 50     Years: 45 00     Pack years: 22 50     Types: Cigarettes     Quit date: 4/10/2020     Years since quitting: 3 0   • Smokeless tobacco: Never   Vaping Use   • Vaping Use: Never used   Substance and Sexual Activity   • Alcohol use: Yes     Comment: 1-2 drinks once per week   • Drug use: Never   • Sexual activity: Yes   Other Topics Concern   • Not on file   Social History Narrative   • Not on file     Social Determinants of Health     Financial Resource Strain: Not on file   Food Insecurity: Not on file   Transportation Needs: Not on file   Physical Activity: Not on file   Stress: Not on file   Social Connections: Not on file   Intimate Partner Violence: Not on file   Housing Stability: Not on file     Family History   Problem Relation Age of Onset   • No Known Problems Mother      No Known Allergies  Current Outpatient Medications on File Prior to Visit   Medication Sig Dispense Refill   • azelastine (ASTELIN) 0 1 % nasal spray 2 sprays into each nostril daily     • Calcium Ascorbate (VITAMIN C) 500 mg tablet Take 500 mg by mouth daily     • cholecalciferol (VITAMIN D3) 1,000 units tablet Take 1,000 Units by mouth daily     • cyclobenzaprine (FLEXERIL) 10 mg tablet Take 10 mg by mouth daily at bedtime      • estradiol (ESTRACE) 1 mg tablet Take 2 mg by mouth daily at bedtime       • famotidine (PEPCID) 10 mg tablet Take 10 mg by mouth daily at bedtime     • fexofenadine (ALLEGRA) 60 MG tablet Take 60 mg by mouth daily at bedtime      • fluticasone (FLONASE) 50 mcg/act nasal spray 50 mcg into each nostril daily "    • levothyroxine 50 mcg tablet Take 50 mcg by mouth daily     • losartan (COZAAR) 25 mg tablet Take 25 mg by mouth daily at bedtime     • montelukast (SINGULAIR) 10 mg tablet Take 10 mg by mouth daily at bedtime      • Multiple Vitamins-Minerals (multivitamin with minerals) tablet Take 1 tablet by mouth daily     • rosuvastatin (CRESTOR) 10 MG tablet Take 10 mg by mouth daily at bedtime      • [DISCONTINUED] gabapentin (NEURONTIN) 300 mg capsule TAKE 1 CAPSULE (300 MG TOTAL) BY MOUTH TWO (TWO) TIMES A DAY 60 capsule 1   • oxyCODONE (Roxicodone) 5 immediate release tablet Take 1 tablet (5 mg total) by mouth every 4 (four) hours as needed for moderate pain for up to 15 doses Max Daily Amount: 30 mg (Patient not taking: Reported on 3/15/2023) 15 tablet 0   • [DISCONTINUED] Teriflunomide (Aubagio) 14 MG TABS Take 1 tablet (14 mg total) by mouth in the morning (Patient not taking: Reported on 3/15/2023) 30 tablet 3   • [DISCONTINUED] Teriflunomide (Aubagio) 7 MG TABS Take 1 tablet (7 mg total) by mouth in the morning (Patient not taking: Reported on 3/15/2023) 14 tablet 0     No current facility-administered medications on file prior to visit  Review of Systems   Refer to positive review of systems in HPI     Review of Systems    Constitutional- No fever  Eyes- No visual change  ENT- Hearing normal  CV- No chest pain  Resp- No Shortness of breath  GI- No diarrhea  - Bladder normal  MS- No Arthritis   Skin- No rash  Psych- No depression  Endo- No DM  Heme- No nodes    Vitals:    05/16/23 1559   BP: 129/84   BP Location: Left arm   Patient Position: Sitting   Cuff Size: Standard   Pulse: 85   Temp: (!) 97 °F (36 1 °C)   TempSrc: Tympanic   SpO2: 96%   Weight: 105 kg (232 lb)   Height: 5' 9\" (1 753 m)       PHYSICAL EXAM:  Appearance: No Acute Distress  Ophthalmoscopic: Disc Flat, Normal fundus  Mental status:  Orientation: Awake, Alert, and Orientedx3  Memory: Registation 3/3 Recall 3/3  Attention: " normal  Knowledge: good  Language: No aphasia  Speech: No dysarthria  Cranial Nerves:  2 No Visual Defect on Confrontation, Pupils round, equal, reactive to light  3,4,6 Extraocular Movements Intact, no nystagmus  5 Facial Sensation Intact  7 No facial asymmetry  8 Intact hearing  9,10 Palate symmetric, normal gag  11 Good shoulder shrug  12 Tongue Midline  Gait: Stable, slight difficulty walking on toes and heels  Coordination: No ataxia with finger to nose testing, and heel to shin  Sensory: Intact, Symmetric to pinprick, light touch, vibration, and joint position  Muscle Tone: Normal              Muscle exam:  Arm Right Left Leg Right Left   Deltoid 5/5 5/5 Iliopsoas 5/5 5/5   Biceps 5/5 5/5 Quads 5/5 5/5   Triceps 5/5 5/5 Hamstrings 5/5 5/5   Wrist Extension 5/5 5/5 Ankle Dorsi Flexion 5/5 5/5   Wrist Flexion 5/5 5/5 Ankle Plantar Flexion 5/5 5/5   Interossei 5/5 5/5 Ankle Eversion 5/5 5/5   APB 5/5 5/5 Ankle Inversion 5/5 5/5       Reflexes   RJ BJ TJ KJ AJ Plantars Guerra's   Right 2+ 2+ 2+ 2+  Downgoing Not present   Left 2+ 2+ 2+ 2+  Downgoing Not present     Personal review of  Labs:                  Diagnoses and all orders for this visit:      1  Meningioma ([address] )        2  Multiple sclerosis (HCC)  Teriflunomide (Aubagio) 7 MG TABS    Teriflunomide (Aubagio) 14 MG TABS      3  Fatigue  modafinil (PROVIGIL) 100 mg tablet      4  Neuropathy  gabapentin (NEURONTIN) 300 mg capsule          Patient is stable clinically  Will represcribe aubagio as DMD   For fatigue she would like to retry provigil  If it doesn't work, discussed trying Ritalin  Encouraged physical exercise               Total time of encounter:  30 min  More than 50% of the time was used in counseling and/or coordination of care  Extent of counseling and/or coordination of care        MD Rachna Dc [facility]  [address]  [address]  [phone]

## 2023-05-17 NOTE — TELEPHONE ENCOUNTER
2nd call  [facility] confirming the appointment on 02/22/22
[facility] confirming the appointment on 02/22/22
Patient confirmed appointment on 02/22/22
Yes

## 2023-06-16 ENCOUNTER — APPOINTMENT (OUTPATIENT)
Dept: RADIOLOGY | Facility: CLINIC | Age: 53
End: 2023-06-16
Payer: COMMERCIAL

## 2023-06-16 ENCOUNTER — OFFICE VISIT (OUTPATIENT)
Dept: OBGYN CLINIC | Facility: CLINIC | Age: 53
End: 2023-06-16
Payer: COMMERCIAL

## 2023-06-16 VITALS
DIASTOLIC BLOOD PRESSURE: 83 MMHG | HEIGHT: 69 IN | HEART RATE: 80 BPM | BODY MASS INDEX: 34.36 KG/M2 | WEIGHT: 232 LBS | SYSTOLIC BLOOD PRESSURE: 127 MMHG

## 2023-06-16 DIAGNOSIS — M79.671 PAIN IN RIGHT FOOT: Primary | ICD-10-CM

## 2023-06-16 DIAGNOSIS — M79.671 PAIN IN RIGHT FOOT: ICD-10-CM

## 2023-06-16 PROCEDURE — 99214 OFFICE O/P EST MOD 30 MIN: CPT | Performed by: PHYSICIAN ASSISTANT

## 2023-06-16 PROCEDURE — 73630 X-RAY EXAM OF FOOT: CPT

## 2023-06-16 NOTE — PROGRESS NOTES
Assessment/Plan:  1  Pain in right foot  XR foot 3+ vw right        The patient does have significant tenderness about the base of the fifth metatarsal, concerning for possible stress fracture  I do not appreciate any osseous abnormality on x-rays today  She also has some swelling over the peroneal tendons, however her strength testing is quite good today  MRI will also assess for any partial tear of the peroneal tendons  Patient will follow-up after MRI to discuss the results and how to proceed  We did discuss a period of nonweightbearing if a stress fracture is found  Subjective:   Niraj Patterson is a 46 y o  female who presents 5 in 1 flat foot correction surgery Dr Kayla Plascencia on 10/20/21  She notes she has always had some lateral ankle swelling since the surgery, but otherwise had done fairly well  She notes about a month ago she started with some increased pain about the lateral foot  She localizes this to the region of the base of the fifth metatarsal   She complains of some swelling posterior to the lateral malleolus as well  She notes pain with every step at this point and she is unable to go on long walks due to this pain  She notes good sensation of the right lower extremity  The patient is not currently wearing any orthotics in her shoes  Review of Systems   Constitutional: Negative  Negative for chills and fever  HENT: Negative  Negative for ear pain and sore throat  Eyes: Negative  Negative for pain and redness  Respiratory: Negative  Negative for shortness of breath and wheezing  Cardiovascular: Negative for chest pain and palpitations  Gastrointestinal: Negative  Negative for abdominal pain and blood in stool  Endocrine: Negative  Negative for polydipsia and polyuria  Genitourinary: Negative  Negative for difficulty urinating and dysuria  Musculoskeletal:        As noted in HPI   Skin: Negative  Negative for pallor and rash  Neurological: Negative    Negative for dizziness and numbness  Hematological: Negative  Negative for adenopathy  Does not bruise/bleed easily  Psychiatric/Behavioral: Negative  Negative for confusion and suicidal ideas  Past Medical History:   Diagnosis Date   • Ankle arthritis     right is having surgery soon for this   • Cancer [facility]) 2007    cervical   • Disease of thyroid gland     hypo   • GERD (gastroesophageal reflux disease)    • History of transfusion     age 3   • Hyperlipidemia    • MS (congenital mitral stenosis)     patient has multiple sclerosis    • Multiple sclerosis ([address] )        Past Surgical History:   Procedure Laterality Date   • CARPAL TUNNEL RELEASE Right    • COLONOSCOPY     • HYSTERECTOMY     • CA ARTHRP INTERPOS INTERCARPAL/METACARPAL JOINTS Right 10/27/2022    Procedure: ARTHROPLASTY THUMB Margurite Crosser; CARPOMETACARPAL ARTHROPLASTY RIGHT THUMB;  Surgeon: Benjy Shelby MD;  Location: [address] [place] [address];  Service: Orthopedics   • CA ARTHRP INTERPOS INTERCARPAL/METACARPAL JOINTS Left 1/30/2023    Procedure:  Thumb Carpometacarpal Arthroplasty;  Surgeon: Benjy Shelby MD;  Location: [facility] MAIN OR;  Service: Orthopedics   • CA NEUROPLASTY &/TRANSPOS MEDIAN NRV CARPAL Marta Copper Right 9/17/2021    Procedure: RELEASE CARPAL TUNNEL;  Surgeon: Candida Guerrero DO;  Location: [address] [place] [address];  Service: Orthopedics   • CA OSTEOTOMY CALCANEUS W/WO INTERNAL FIXATION Right 10/20/2021    Procedure: OSTEOTOMY CALCANEUS, Medial slide calcaneal osteotomy, FDL tendon transfer to navicular, Cotton osteotomy, Achilles lengthening, Wilson osteotomy;  Surgeon: Nelson Motley MD;  Location: [facility] MAIN OR;  Service: Orthopedics   • SINUS SURGERY     • WRIST SURGERY Left 2020       Family History   Problem Relation Age of Onset   • No Known Problems Mother        Social History     Occupational History   • Not on file   Tobacco Use   • Smoking status: Former     Packs/day: 0 50     Years: 45 00     Total pack years: 22 50     Types: Cigarettes     Quit date: 4/10/2020     Years since quitting: 3 1   • Smokeless tobacco: Never   Vaping Use   • Vaping Use: Never used   Substance and Sexual Activity   • Alcohol use: Yes     Comment: 1-2 drinks once per week   • Drug use: Never   • Sexual activity: Yes         Current Outpatient Medications:   •  azelastine (ASTELIN) 0 1 % nasal spray, 2 sprays into each nostril daily, Disp: , Rfl:   •  Calcium Ascorbate (VITAMIN C) 500 mg tablet, Take 500 mg by mouth daily, Disp: , Rfl:   •  cholecalciferol (VITAMIN D3) 1,000 units tablet, Take 1,000 Units by mouth daily, Disp: , Rfl:   •  cyclobenzaprine (FLEXERIL) 10 mg tablet, Take 10 mg by mouth daily at bedtime , Disp: , Rfl:   •  estradiol (ESTRACE) 1 mg tablet, Take 2 mg by mouth daily at bedtime  , Disp: , Rfl:   •  famotidine (PEPCID) 10 mg tablet, Take 10 mg by mouth daily at bedtime, Disp: , Rfl:   •  fexofenadine (ALLEGRA) 60 MG tablet, Take 60 mg by mouth daily at bedtime , Disp: , Rfl:   •  fluticasone (FLONASE) 50 mcg/act nasal spray, 50 mcg into each nostril daily, Disp: , Rfl:   •  gabapentin (NEURONTIN) 300 mg capsule, Take 1 tab in morning and 2 tabs at bedtime  , Disp: 90 capsule, Rfl: 4  •  levothyroxine 50 mcg tablet, Take 50 mcg by mouth daily, Disp: , Rfl:   •  losartan (COZAAR) 25 mg tablet, Take 25 mg by mouth daily at bedtime, Disp: , Rfl:   •  modafinil (PROVIGIL) 100 mg tablet, Take 1 tablet (100 mg total) by mouth daily, Disp: 30 tablet, Rfl: 3  •  montelukast (SINGULAIR) 10 mg tablet, Take 10 mg by mouth daily at bedtime , Disp: , Rfl:   •  Multiple Vitamins-Minerals (multivitamin with minerals) tablet, Take 1 tablet by mouth daily, Disp: , Rfl:   •  rosuvastatin (CRESTOR) 10 MG tablet, Take 10 mg by mouth daily at bedtime , Disp: , Rfl:   •  Teriflunomide (Aubagio) 7 MG TABS, Take 1 tablet (7 mg total) by mouth in the morning For first 2 weeks  , Disp: 14 tablet, Rfl: 0  •  oxyCODONE (Roxicodone) 5 immediate release tablet, Take 1 tablet (5 mg total) by mouth every 4 (four) hours as needed for moderate pain for up to 15 doses Max Daily Amount: 30 mg (Patient not taking: Reported on 3/15/2023), Disp: 15 tablet, Rfl: 0  •  Teriflunomide (Aubagio) 14 MG TABS, Take 1 tablet (14 mg total) by mouth in the morning (Patient not taking: Reported on 6/16/2023), Disp: 30 tablet, Rfl: 3    No Known Allergies    Objective:  Vitals:    06/16/23 1355   BP: 127/83   Pulse: 80            Right Ankle Exam     Tenderness   Right ankle tenderness location: Tenderness base of proximal 1/3 5th metatarsal    Swelling: moderate (overlying peroneal tendonsposterior to lateral malleolus)    Range of Motion   Dorsiflexion: 15   Plantar flexion: 25     Muscle Strength   Peroneal muscle:  5/5    Other   Erythema: absent  Sensation: normal  Pulse: present             Physical Exam  Constitutional:       General: She is not in acute distress  Appearance: She is well-developed  HENT:      Head: Normocephalic and atraumatic  Eyes:      General: No scleral icterus  Conjunctiva/sclera: Conjunctivae normal    Neck:      Vascular: No JVD  Cardiovascular:      Rate and Rhythm: Normal rate  Pulmonary:      Effort: Pulmonary effort is normal  No respiratory distress  Skin:     General: Skin is warm  Neurological:      Mental Status: She is alert and oriented to person, place, and time  Coordination: Coordination normal          I have personally reviewed pertinent films in PACS and my interpretation is as follows:  X-rays right foot: Orthopedic hardware  No acute osseous abnormality  This document was created using speech voice recognition software  Grammatical errors, random word insertions, pronoun errors, and incomplete sentences are an occasional consequence of this system due to software limitations, ambient noise, and hardware issues     Any formal questions or concerns about content, text, or information contained within the body of this dictation should be directly addressed to the provider for clarification

## 2023-06-28 ENCOUNTER — TELEPHONE (OUTPATIENT)
Dept: DERMATOLOGY | Facility: CLINIC | Age: 53
End: 2023-06-28

## 2023-06-28 ENCOUNTER — OFFICE VISIT (OUTPATIENT)
Age: 53
End: 2023-06-28
Payer: COMMERCIAL

## 2023-06-28 VITALS — WEIGHT: 230.8 LBS | BODY MASS INDEX: 34.18 KG/M2 | TEMPERATURE: 95.7 F | HEIGHT: 69 IN

## 2023-06-28 DIAGNOSIS — R20.2 NOTALGIA PARESTHETICA: ICD-10-CM

## 2023-06-28 DIAGNOSIS — L82.1 SEBORRHEIC KERATOSIS: ICD-10-CM

## 2023-06-28 DIAGNOSIS — L08.1 ERYTHRASMA: ICD-10-CM

## 2023-06-28 DIAGNOSIS — L81.4 LENTIGO: ICD-10-CM

## 2023-06-28 DIAGNOSIS — D22.9 MULTIPLE MELANOCYTIC NEVI: Primary | ICD-10-CM

## 2023-06-28 RX ORDER — TRIAMCINOLONE ACETONIDE 0.25 MG/G
CREAM TOPICAL 2 TIMES DAILY
Qty: 80 G | Refills: 0 | Status: SHIPPED | OUTPATIENT
Start: 2023-06-28 | End: 2023-07-12

## 2023-06-28 RX ORDER — ERYTHROMYCIN 20 MG/ML
1 SOLUTION TOPICAL 2 TIMES DAILY
Qty: 60 EACH | Refills: 0 | Status: SHIPPED | OUTPATIENT
Start: 2023-06-28 | End: 2023-07-12

## 2023-06-28 NOTE — TELEPHONE ENCOUNTER
Received call from pharmacy  Erythromycin 2 % PADS are not covered by insurance but solution is  They accepted verbal that this was okay to substitute  This is an FYI - if it's not okay to substitute, please let me know and I'll contact pharmacy

## 2023-06-28 NOTE — TELEPHONE ENCOUNTER
Heriberto Huerta Ports can you please provide patient with alternate meds? Erythromycin is not covered

## 2023-06-28 NOTE — PATIENT INSTRUCTIONS
"MELANOCYTIC NEVI (\"Moles\")    Physical Exam:  Anatomic Location Affected:   Mostly on sun-exposed areas of the trunk and extremities      Assessment and Plan:  Based on a thorough discussion of this condition and the management approach to it (including a comprehensive discussion of the known risks, side effects and potential benefits of treatment), the patient (family) agrees to implement the following specific plan:  When outside we recommend using a wide brim hat, sunglasses, long sleeve and pants, sunscreen with SPF 77+ with reapplication every 2 hours, or SPF specific clothing   Benign, reassured  Annual skin check     Melanocytic Nevi  Melanocytic nevi (\"moles\") are tan or brown, raised or flat areas of the skin which have an increased number of melanocytes  Melanocytes are the cells in our body which make pigment and account for skin color  Some moles are present at birth (I e , \"congenital nevi\"), while others come up later in life (i e , \"acquired nevi\")  The sun can stimulate the body to make more moles  Sunburns are not the only thing that triggers more moles  Chronic sun exposure can do it too  Clinically distinguishing a healthy mole from melanoma may be difficult, even for experienced dermatologists  The \"ABCDE's\" of moles have been suggested as a means of helping to alert a person to a suspicious mole and the possible increased risk of melanoma  The suggestions for raising alert are as follows:    Asymmetry: Healthy moles tend to be symmetric, while melanomas are often asymmetric  Asymmetry means if you draw a line through the mole, the two halves do not match in color, size, shape, or surface texture  Asymmetry can be a result of rapid enlargement of a mole, the development of a raised area on a previously flat lesion, scaling, ulceration, bleeding or scabbing within the mole    Any mole that starts to demonstrate \"asymmetry\" should be examined promptly by a board certified dermatologist  " "    Border: Healthy moles tend to have discrete, even borders  The border of a melanoma often blends into the normal skin and does not sharply delineate the mole from normal skin  Any mole that starts to demonstrate \"uneven borders\" should be examined promptly by a board certified dermatologist      Color: Healthy moles tend to be one color throughout  Melanomas tend to be made up of different colors ranging from dark black, blue, white, or red  Any mole that demonstrates a color change should be examined promptly by a board certified dermatologist      Diameter: Healthy moles tend to be smaller than 0 6 cm in size; an exception are \"congenital nevi\" that can be larger  Melanomas tend to grow and can often be greater than 0 6 cm (1/4 of an inch, or the size of a pencil eraser)  This is only a guideline, and many normal moles may be larger than 0 6 cm without being unhealthy  Any mole that starts to change in size (small to bigger or bigger to smaller) should be examined promptly by a board certified dermatologist      Evolving: Healthy moles tend to \"stay the same  \"  Melanomas may often show signs of change or evolution such as a change in size, shape, color, or elevation  Any mole that starts to itch, bleed, crust, burn, hurt, or ulcerate or demonstrate a change or evolution should be examined promptly by a board certified dermatologist         LENTIGO    Physical Exam:  Anatomic Location Affected:  trunk, arms      Assessment and Plan:  Based on a thorough discussion of this condition and the management approach to it (including a comprehensive discussion of the known risks, side effects and potential benefits of treatment), the patient (family) agrees to implement the following specific plan:  When outside we recommend using a wide brim hat, sunglasses, long sleeve and pants, sunscreen with SPF 23+ with reapplication every 2 hours, or SPF specific clothing       What is a lentigo?   A lentigo is a pigmented " flat or slightly raised lesion with a clearly defined edge  Unlike an ephelis (freckle), it does not fade in the winter months  There are several kinds of lentigo  The name lentigo originally referred to its appearance resembling a small lentil  The plural of lentigo is lentigines, although “lentigos” is also in common use  Who gets lentigines? Lentigines can affect males and females of all ages and races  Solar lentigines are especially prevalent in fair skinned adults  Lentigines associated with syndromes are present at birth or arise during childhood  What causes lentigines? Common forms of lentigo are due to exposure to ultraviolet radiation:  Sun damage including sunburn   Indoor tanning   Phototherapy, especially photochemotherapy (PUVA)    Ionizing radiation, eg radiation therapy, can also cause lentigines  Several familial syndromes associated with widespread lentigines originate from mutations in Deni-MAP kinase, mTOR signaling and PTEN pathways  What is the treatment for lentigines? Most lentigines are left alone  Attempts to lighten them may not be successful  The following approaches are used:  SPF 50+ broad-spectrum sunscreen   Hydroquinone bleaching cream   Alpha hydroxy acids   Vitamin C   Retinoids   Azelaic acid   Chemical peels  Individual lesions can be permanently removed using:  Cryotherapy   Intense pulsed light   Pigment lasers    How can lentigines be prevented? Lentigines associated with exposure ultraviolet radiation can be prevented by very careful sun protection  Clothing is more successful at preventing new lentigines than are sunscreens  What is the outlook for lentigines? Lentigines usually persist  They may increase in number with age and sun exposure  Some in sun-protected sites may fade and disappear          SEBORRHEIC KERATOSIS; NON-INFLAMED    Physical Exam:  Anatomic Location Affected:  trunk  \    Assessment and Plan:  Based on a thorough discussion of this "condition and the management approach to it (including a comprehensive discussion of the known risks, side effects and potential benefits of treatment), the patient (family) agrees to implement the following specific plan:  Monitor for changes  Benign, reassured      Seborrheic Keratosis  A seborrheic keratosis is a harmless warty spot that appears during adult life as a common sign of skin aging  Seborrheic keratoses can arise on any area of skin, covered or uncovered, with the usual exception of the palms and soles  They do not arise from mucous membranes  Seborrheic keratoses can have highly variable appearance  Seborrheic keratoses are extremely common  It has been estimated that over 90% of adults over the age of 61 years have one or more of them  They occur in males and females of all races, typically beginning to erupt in the 35s or 45s  They are uncommon under the age of 21 years  The precise cause of seborrhoeic keratoses is not known  Seborrhoeic keratoses are considered degenerative in nature  As time goes by, seborrheic keratoses tend to become more numerous  Some people inherit a tendency to develop a very large number of them; some people may have hundreds of them  There is no easy way to remove multiple lesions on a single occasion  Unless a specific lesion is \"inflamed\" and is causing pain or stinging/burning or is bleeding, most insurance companies do not authorize treatment  NOTALGIA PARESTHETICA    Physical Exam:  Anatomic Location Affected:  Left scapula         Assessment and Plan:  Based on a thorough discussion of this condition and the management approach to it (including a comprehensive discussion of the known risks, side effects and potential benefits of treatment), the patient (family) agrees to implement the following specific plan:  Discussed treatment options : topical , intralesional steroids , physical therapy   TENS UNIT      Start Triamcinolone 0 025 % cream    " "Apply topically to back twice a day for two weeks   If does not improve consider intralesional injections   Start cerave anti -itch cream   Apply topically 2-3 times a day     Assessment and Plan:  Notalgia paresthetica is a condition where itch and changed sensation arise in the areas of skin on the medial aspect of the shoulder blade on either side of the back  Notalgia means pain in the back, and paraesthetica refers to burning pain, tingling or itch  It is also called thoracic cutaneous nerve entrapment syndrome  The nerves which supply sensation to the upper back emerge from the spinal cord (2nd to 6th thoracic segments) and run a long course up through the thick muscles of the back  They make a right-angled turn before reaching the skin  The nerves appear to be vulnerable to compression or traction  Partial compression or injury leads to the symptoms  Initial injury to the nerves may include:  Back injury  Herniated or \"slipped\" disc  Herpes zoster (shingles)  Sunburn  Myelopathy (muscular disease)  Small fiber neuropathy    Notalgia paresthetica often starts after a period of intense exercise leading to muscular stiffness, or a period of inactivity  Sometimes, a specific injury may be recalled  It is characterized by intense itch on the medial border of one scapula or both, ie, between the shoulder blades  This itch can be intermittent or continuous  It is unrelieved by scratching, although the scratching and rubbing may be pleasurable  The affected area may spread to both shoulder blades and more widely over the back and shoulders  In many patients, there are no visible signs  Visible changes often arise from rubbing and scratching the affected area   These include:  Scratch marks  Hyperpigmentation (brown marks)  Hypopigmentation (white marks)  Lichen simplex (a type of eczema)  Scarring    There may be changed sensation in the affected area of skin in response to light touch, sharp " objects, cold, and heat  There may be reduced or absent sweating in the affected area  Radiology such as X-ray, CT scan or MRI may demonstrate a degenerative vertebra or prolapsed disc in the area that corresponds to the nerve supply to the affected skin  In many cases, no abnormality is revealed  Treatment is not always necessary, and it is not always successful  Typical management may include the following:  Cooling lotions or creams as required (camphor and menthol)  Topical steroids to treat associated lichen simplex  Capsaicin cream - this depletes nerve endings of their chemical transmitters but requires frequent reapplication and causes unpleasant local side effects  Local anesthetic creams may provide temporary relief of symptoms  Amitriptyline or other oral tricyclic medications at night to help sleep and counteract neuropathic symptoms  Gabapentin, pregabalin or other anticonvulsants can relieve unpleasant burning or tingling sensations  Transcutaneous electrical nerve stimulation (TENS)  Surgical decompression of vertebral nerve impingement    Physical therapy with repetitive exercises and stretches for the upper back has been reported to be effective  While sitting, cross arms and bend forwards to stretch upper back  Arms at sides, raise shoulders and rotate them forwards and backwards  Arms straight, rotate forwards 360 degrees and backwards 360 degrees  Rotate upper body left and right until stretch is felt and hold  Massage the muscles besides the spine in the affected area    ERYTHRASMA   Physical Exam:  Anatomic Location Affected:  inframammary       Assessment and Plan:  Based on a thorough discussion of this condition and the management approach to it (including a comprehensive discussion of the known risks, side effects and potential benefits of treatment), the patient (family) agrees to implement the following specific plan:  Start Erythromycin 2 % pads     Apply topically twice a day for 2 weeks

## 2023-06-28 NOTE — PROGRESS NOTES
"[facility] Note     Patient Name: Tata Peñaloza  Encounter Date: [phone]     Have you been cared for by a [facility] Dermatologist in the last 3 years and, if so, which description applies to you? NO  I am considered a \"new\" patient and must complete all patient intake questions  I am FEMALE/of child-bearing potential     REVIEW OF SYSTEMS:  Have you recently had or currently have any of the following? · Recent fever or chills? No  · Any non-healing wound? No  ·    PAST MEDICAL HISTORY:  Have you personally ever had or currently have any of the following? If \"YES,\" then please provide more detail  · Skin cancer (such as Melanoma, Basal Cell Carcinoma, Squamous Cell Carcinoma? No  · Tuberculosis, HIV/AIDS, Hepatitis B or C: No  · Systemic Immunosuppression such as Diabetes, Biologic or Immunotherapy, Chemotherapy, Organ Transplantation, Bone Marrow Transplantation No  · Radiation Treatment No   FAMILY HISTORY:  Any \"first degree relatives\" (parent, brother, sister, or child) with the following? • Skin Cancer, Pancreatic or Other Cancer? No   PATIENT EXPERIENCE:    • Do you want the Dermatologist to perform a COMPLETE skin exam today including a clinical examination under the \"bra and underwear\" areas? Yes  • If necessary, do we have your permission to call and leave a detailed message on your Preferred Phone number that includes your specific medical information?   Yes      No Known Allergies   Current Outpatient Medications:   •  azelastine (ASTELIN) 0 1 % nasal spray, 2 sprays into each nostril daily, Disp: , Rfl:   •  Calcium Ascorbate (VITAMIN C) 500 mg tablet, Take 500 mg by mouth daily, Disp: , Rfl:   •  cholecalciferol (VITAMIN D3) 1,000 units tablet, Take 1,000 Units by mouth daily, Disp: , Rfl:   •  cyclobenzaprine (FLEXERIL) 10 mg tablet, Take 10 mg by mouth daily at bedtime , Disp: , Rfl:   •  estradiol (ESTRACE) 1 mg tablet, Take 2 mg by mouth daily at bedtime  , Disp: , " "Rfl:   •  famotidine (PEPCID) 10 mg tablet, Take 10 mg by mouth daily at bedtime, Disp: , Rfl:   •  fexofenadine (ALLEGRA) 60 MG tablet, Take 60 mg by mouth daily at bedtime , Disp: , Rfl:   •  fluticasone (FLONASE) 50 mcg/act nasal spray, 50 mcg into each nostril daily, Disp: , Rfl:   •  gabapentin (NEURONTIN) 300 mg capsule, Take 1 tab in morning and 2 tabs at bedtime  , Disp: 90 capsule, Rfl: 4  •  levothyroxine 50 mcg tablet, Take 50 mcg by mouth daily, Disp: , Rfl:   •  losartan (COZAAR) 25 mg tablet, Take 25 mg by mouth daily at bedtime, Disp: , Rfl:   •  modafinil (PROVIGIL) 100 mg tablet, Take 1 tablet (100 mg total) by mouth daily, Disp: 30 tablet, Rfl: 3  •  montelukast (SINGULAIR) 10 mg tablet, Take 10 mg by mouth daily at bedtime , Disp: , Rfl:   •  Multiple Vitamins-Minerals (multivitamin with minerals) tablet, Take 1 tablet by mouth daily, Disp: , Rfl:   •  oxyCODONE (Roxicodone) 5 immediate release tablet, Take 1 tablet (5 mg total) by mouth every 4 (four) hours as needed for moderate pain for up to 15 doses Max Daily Amount: 30 mg (Patient not taking: Reported on 3/15/2023), Disp: 15 tablet, Rfl: 0  •  rosuvastatin (CRESTOR) 10 MG tablet, Take 10 mg by mouth daily at bedtime , Disp: , Rfl:   •  Teriflunomide (Aubagio) 14 MG TABS, Take 1 tablet (14 mg total) by mouth in the morning (Patient not taking: Reported on 6/16/2023), Disp: 30 tablet, Rfl: 3  •  Teriflunomide (Aubagio) 7 MG TABS, Take 1 tablet (7 mg total) by mouth in the morning For first 2 weeks  , Disp: 14 tablet, Rfl: 0          • Whom besides the patient is providing clinical information about today's encounter?   o NO ADDITIONAL HISTORIAN (patient alone provided history)    Physical Exam and Assessment/Plan by Diagnosis:    MELANOCYTIC NEVI (\"Moles\")    Physical Exam:  • Anatomic Location Affected:   Mostly on sun-exposed areas of the trunk and extremities  • Morphological Description:  Scattered, 1-4mm round to ovoid, " "symmetrical-appearing, even bordered, skin colored to dark brown macules/papules, mostly in sun-exposed areas  • Pertinent Positives:  • Pertinent Negatives: Additional History of Present Condition:  Present on exam     Assessment and Plan:  Based on a thorough discussion of this condition and the management approach to it (including a comprehensive discussion of the known risks, side effects and potential benefits of treatment), the patient (family) agrees to implement the following specific plan:  • When outside we recommend using a wide brim hat, sunglasses, long sleeve and pants, sunscreen with SPF 66+ with reapplication every 2 hours, or SPF specific clothing   • Benign, reassured     Melanocytic Nevi  Melanocytic nevi (\"moles\") are tan or brown, raised or flat areas of the skin which have an increased number of melanocytes  Melanocytes are the cells in our body which make pigment and account for skin color  Some moles are present at birth (I e , \"congenital nevi\"), while others come up later in life (i e , \"acquired nevi\")  The sun can stimulate the body to make more moles  Sunburns are not the only thing that triggers more moles  Chronic sun exposure can do it too  Clinically distinguishing a healthy mole from melanoma may be difficult, even for experienced dermatologists  The \"ABCDE's\" of moles have been suggested as a means of helping to alert a person to a suspicious mole and the possible increased risk of melanoma  The suggestions for raising alert are as follows:    Asymmetry: Healthy moles tend to be symmetric, while melanomas are often asymmetric  Asymmetry means if you draw a line through the mole, the two halves do not match in color, size, shape, or surface texture  Asymmetry can be a result of rapid enlargement of a mole, the development of a raised area on a previously flat lesion, scaling, ulceration, bleeding or scabbing within the mole    Any mole that starts to demonstrate " "\"asymmetry\" should be examined promptly by a board certified dermatologist      Border: Healthy moles tend to have discrete, even borders  The border of a melanoma often blends into the normal skin and does not sharply delineate the mole from normal skin  Any mole that starts to demonstrate \"uneven borders\" should be examined promptly by a board certified dermatologist      Color: Healthy moles tend to be one color throughout  Melanomas tend to be made up of different colors ranging from dark black, blue, white, or red  Any mole that demonstrates a color change should be examined promptly by a board certified dermatologist      Diameter: Healthy moles tend to be smaller than 0 6 cm in size; an exception are \"congenital nevi\" that can be larger  Melanomas tend to grow and can often be greater than 0 6 cm (1/4 of an inch, or the size of a pencil eraser)  This is only a guideline, and many normal moles may be larger than 0 6 cm without being unhealthy  Any mole that starts to change in size (small to bigger or bigger to smaller) should be examined promptly by a board certified dermatologist      Evolving: Healthy moles tend to \"stay the same  \"  Melanomas may often show signs of change or evolution such as a change in size, shape, color, or elevation  Any mole that starts to itch, bleed, crust, burn, hurt, or ulcerate or demonstrate a change or evolution should be examined promptly by a board certified dermatologist         LENTIGO    Physical Exam:  • Anatomic Location Affected:  Face, shoulders  • Morphological Description:  Light brown macules  • Pertinent Positives:  • Pertinent Negatives:     Additional History of Present Condition:  Present on exam     Assessment and Plan:  Based on a thorough discussion of this condition and the management approach to it (including a comprehensive discussion of the known risks, side effects and potential benefits of treatment), the patient (family) agrees to implement the " following specific plan:  • When outside we recommend using a wide brim hat, sunglasses, long sleeve and pants, sunscreen with SPF 67+ with reapplication every 2 hours, or SPF specific clothing       What is a lentigo? A lentigo is a pigmented flat or slightly raised lesion with a clearly defined edge  Unlike an ephelis (freckle), it does not fade in the winter months  There are several kinds of lentigo  The name lentigo originally referred to its appearance resembling a small lentil  The plural of lentigo is lentigines, although “lentigos” is also in common use  Who gets lentigines? Lentigines can affect males and females of all ages and races  Solar lentigines are especially prevalent in fair skinned adults  Lentigines associated with syndromes are present at birth or arise during childhood  What causes lentigines? Common forms of lentigo are due to exposure to ultraviolet radiation:  • Sun damage including sunburn   • Indoor tanning   • Phototherapy, especially photochemotherapy (PUVA)    Ionizing radiation, eg radiation therapy, can also cause lentigines  Several familial syndromes associated with widespread lentigines originate from mutations in Deni-MAP kinase, mTOR signaling and PTEN pathways  What is the treatment for lentigines? Most lentigines are left alone  Attempts to lighten them may not be successful  The following approaches are used:  • SPF 50+ broad-spectrum sunscreen   • Hydroquinone bleaching cream   • Alpha hydroxy acids   • Vitamin C   • Retinoids   • Azelaic acid   • Chemical peels  Individual lesions can be permanently removed using:  • Cryotherapy   • Intense pulsed light   • Pigment lasers    How can lentigines be prevented? Lentigines associated with exposure ultraviolet radiation can be prevented by very careful sun protection  Clothing is more successful at preventing new lentigines than are sunscreens  What is the outlook for lentigines?   Lentigines usually persist  "They may increase in number with age and sun exposure  Some in sun-protected sites may fade and disappear  •       NOTALGIA PARESTHETICA    Physical Exam:  • Anatomic Location Affected:  Left scapula   • Morphological Description:  Normal   • Pertinent Positives:  • Pertinent Negatives: Additional History of Present Condition:  Patient notes itchiness   No previous treatment, sometimes scratches back against wall    Assessment and Plan:  Based on a thorough discussion of this condition and the management approach to it (including a comprehensive discussion of the known risks, side effects and potential benefits of treatment), the patient (family) agrees to implement the following specific plan:  • Discussed treatment options : topical , intralesional steroids , physical therapy   • TENS UNIT   • Start Triamcinolone 0 025 % cream    Apply topically to back twice a day for two weeks   • If does not improve consider intralesional injections   • Start cerave anti -itch cream   Apply topically 2-3 times a day     Assessment and Plan:  Notalgia paresthetica is a condition where itch and changed sensation arise in the areas of skin on the medial aspect of the shoulder blade on either side of the back  Notalgia means pain in the back, and paraesthetica refers to burning pain, tingling or itch  It is also called thoracic cutaneous nerve entrapment syndrome  The nerves which supply sensation to the upper back emerge from the spinal cord (2nd to 6th thoracic segments) and run a long course up through the thick muscles of the back  They make a right-angled turn before reaching the skin  The nerves appear to be vulnerable to compression or traction  Partial compression or injury leads to the symptoms     Initial injury to the nerves may include:  • Back injury  • Herniated or \"slipped\" disc  • Herpes zoster (shingles)  • Sunburn  • Myelopathy (muscular disease)  • Small fiber neuropathy    Notalgia " paresthetica often starts after a period of intense exercise leading to muscular stiffness, or a period of inactivity  Sometimes, a specific injury may be recalled  It is characterized by intense itch on the medial border of one scapula or both, ie, between the shoulder blades  This itch can be intermittent or continuous  It is unrelieved by scratching, although the scratching and rubbing may be pleasurable  The affected area may spread to both shoulder blades and more widely over the back and shoulders  In many patients, there are no visible signs  Visible changes often arise from rubbing and scratching the affected area  These include:  • Scratch marks  • Hyperpigmentation (brown marks)  • Hypopigmentation (white marks)  • Lichen simplex (a type of eczema)  • Scarring    There may be changed sensation in the affected area of skin in response to light touch, sharp objects, cold, and heat  There may be reduced or absent sweating in the affected area  Radiology such as X-ray, CT scan or MRI may demonstrate a degenerative vertebra or prolapsed disc in the area that corresponds to the nerve supply to the affected skin  In many cases, no abnormality is revealed  Treatment is not always necessary, and it is not always successful   Typical management may include the following:  • Cooling lotions or creams as required (camphor and menthol)  • Topical steroids to treat associated lichen simplex  • Capsaicin cream - this depletes nerve endings of their chemical transmitters but requires frequent reapplication and causes unpleasant local side effects  • Local anesthetic creams may provide temporary relief of symptoms  • Amitriptyline or other oral tricyclic medications at night to help sleep and counteract neuropathic symptoms  • Gabapentin, pregabalin or other anticonvulsants can relieve unpleasant burning or tingling sensations  • Transcutaneous electrical nerve stimulation (TENS)  • Surgical decompression of vertebral nerve impingement    Physical therapy with repetitive exercises and stretches for the upper back has been reported to be effective  • While sitting, cross arms and bend forwards to stretch upper back  • Arms at sides, raise shoulders and rotate them forwards and backwards  • Arms straight, rotate forwards 360 degrees and backwards 360 degrees  • Rotate upper body left and right until stretch is felt and hold  • Massage the muscles besides the spine in the affected area    ERYTHRASMA   Physical Exam:  • Anatomic Location Affected:  inframammary   • Morphological Description:  Pink patch   • Pertinent Positives:  • Pertinent Negatives: Additional History of Present Condition:  Patient has sweating  issues , states sour odor   Assessment and Plan:  Based on a thorough discussion of this condition and the management approach to it (including a comprehensive discussion of the known risks, side effects and potential benefits of treatment), the patient (family) agrees to implement the following specific plan:  • Start Erythromycin 2 % pads     Apply topically twice a day for 2 weeks       Scribe Attestation    I,:  Zeina Valencia am acting as a scribe while in the presence of the attending physician :       I,:  Shannen Rosenthal MD personally performed the services described in this documentation    as scribed in my presence :

## 2023-07-14 ENCOUNTER — HOSPITAL ENCOUNTER (OUTPATIENT)
Dept: RADIOLOGY | Facility: HOSPITAL | Age: 53
Discharge: HOME | End: 2023-07-14
Payer: COMMERCIAL

## 2023-07-14 DIAGNOSIS — M79.671 PAIN IN RIGHT FOOT: ICD-10-CM

## 2023-07-14 PROCEDURE — G1004 CDSM NDSC: HCPCS

## 2023-07-14 PROCEDURE — 73718 MRI LOWER EXTREMITY W/O DYE: CPT

## 2023-07-24 ENCOUNTER — OFFICE VISIT (OUTPATIENT)
Dept: OBGYN CLINIC | Facility: CLINIC | Age: 53
End: 2023-07-24
Payer: COMMERCIAL

## 2023-07-24 VITALS
HEART RATE: 77 BPM | DIASTOLIC BLOOD PRESSURE: 75 MMHG | TEMPERATURE: 98.4 F | WEIGHT: 228 LBS | SYSTOLIC BLOOD PRESSURE: 135 MMHG | BODY MASS INDEX: 33.67 KG/M2

## 2023-07-24 DIAGNOSIS — M77.51 TENDONITIS OF ANKLE, RIGHT: ICD-10-CM

## 2023-07-24 DIAGNOSIS — M79.671 PAIN IN RIGHT FOOT: Primary | ICD-10-CM

## 2023-07-24 PROCEDURE — 99214 OFFICE O/P EST MOD 30 MIN: CPT | Performed by: PHYSICIAN ASSISTANT

## 2023-07-24 NOTE — PROGRESS NOTES
Assessment/Plan:  1. Pain in right foot  Ambulatory Referral to Podiatry    Ambulatory Referral to Physical Therapy      2. Tendonitis of ankle, right          The please patient appears to have some tendinitis of the right ankle, his MRI showed no evidence of stress fracture or /ligament tear. I did prescribe her physical therapy today. Given her history of complex foot procedure in 2021, I do feel she would benefit from orthotics. I did refer her to podiatry for custom ones. She may weight-bear as tolerated and do activities as tolerated at this point. She will follow-up as needed. Subjective:   Cheng Hawkins is a 46 y.o. female who presents today for follow-up of her right foot and ankle. The patient had undergone a 5 and 1 flatfoot correction surgery by Dr. Ely Ware on 10/20/2021. She had some orthotics made at that time, however she did not tolerate these well. Due to her recent onset of lateral ankle and foot pain, I had ordered an MRI which showed no evidence of tendon tear or stress fracture. Patient notes her pain is improved some since last visit. She still complains of some lateral ankle pain and swelling, which is worse with activity and better with rest.  She notes good sensation of the right lower extremity. Review of Systems   Constitutional: Negative. Negative for chills and fever. HENT: Negative. Negative for ear pain and sore throat. Eyes: Negative. Negative for pain and redness. Respiratory: Negative. Negative for shortness of breath and wheezing. Cardiovascular: Negative for chest pain and palpitations. Gastrointestinal: Negative. Negative for abdominal pain and blood in stool. Endocrine: Negative. Negative for polydipsia and polyuria. Genitourinary: Negative. Negative for difficulty urinating and dysuria. Musculoskeletal:        As noted in HPI   Skin: Negative. Negative for pallor and rash. Neurological: Negative.   Negative for dizziness and numbness. Hematological: Negative. Negative for adenopathy. Does not bruise/bleed easily. Psychiatric/Behavioral: Negative. Negative for confusion and suicidal ideas. Past Medical History:   Diagnosis Date   • Allergic    • Ankle arthritis     right is having surgery soon for this   • Cancer [facility]) 2007    cervical   • Disease of thyroid gland     hypo   • GERD (gastroesophageal reflux disease)    • History of transfusion     age 3   • Hyperlipidemia    • Hypertension 04/2022   • MS (congenital mitral stenosis)     patient has multiple sclerosis    • Multiple sclerosis ([address])        Past Surgical History:   Procedure Laterality Date   • CARPAL TUNNEL RELEASE Right    • COLONOSCOPY     • HYSTERECTOMY     • CO ARTHRP INTERPOS INTERCARPAL/METACARPAL JOINTS Right 10/27/2022    Procedure: ARTHROPLASTY THUMB Daisy Mason; CARPOMETACARPAL ARTHROPLASTY RIGHT THUMB;  Surgeon: Rahel Cedillo MD;  Location: [facility];  Service: Orthopedics   • CO ARTHRP INTERPOS INTERCARPAL/METACARPAL JOINTS Left 1/30/2023    Procedure:  Thumb Carpometacarpal Arthroplasty;  Surgeon: Rahel Cedillo MD;  Location: [facility];  Service: Orthopedics   • CO NEUROPLASTY &/TRANSPOS MEDIAN NRV CARPAL Erik Manna Right 9/17/2021    Procedure: RELEASE CARPAL TUNNEL;  Surgeon: Kallie Arguello DO;  Location: [facility];  Service: Orthopedics   • CO OSTEOTOMY CALCANEUS W/WO INTERNAL FIXATION Right 10/20/2021    Procedure: OSTEOTOMY CALCANEUS, Medial slide calcaneal osteotomy, FDL tendon transfer to navicular, Cotton osteotomy, Achilles lengthening, Wilson osteotomy;  Surgeon: Mohsen Peck MD;  Location: [facility] MAIN OR;  Service: Orthopedics   • SINUS SURGERY     • WRIST SURGERY Left 2020       Family History   Problem Relation Age of Onset   • No Known Problems Mother        Social History     Occupational History   • Not on file   Tobacco Use   • Smoking status: Former     Packs/day: 0.50     Years: 30.00     Total pack years: 15.00     Types: Cigarettes     Quit date: 4/10/2020     Years since quitting: 3.2   • Smokeless tobacco: Never   Vaping Use   • Vaping Use: Never used   Substance and Sexual Activity   • Alcohol use: Yes     Comment: 1-2 drinks once per week   • Drug use: Never   • Sexual activity: Yes     Partners: Male         Current Outpatient Medications:   •  azelastine (ASTELIN) 0.1 % nasal spray, 2 sprays into each nostril daily, Disp: , Rfl:   •  Calcium Ascorbate (VITAMIN C) 500 mg tablet, Take 500 mg by mouth daily, Disp: , Rfl:   •  cholecalciferol (VITAMIN D3) 1,000 units tablet, Take 1,000 Units by mouth daily, Disp: , Rfl:   •  cyclobenzaprine (FLEXERIL) 10 mg tablet, Take 10 mg by mouth daily at bedtime , Disp: , Rfl:   •  estradiol (ESTRACE) 1 mg tablet, Take 2 mg by mouth daily at bedtime  , Disp: , Rfl:   •  famotidine (PEPCID) 10 mg tablet, Take 10 mg by mouth daily at bedtime, Disp: , Rfl:   •  fexofenadine (ALLEGRA) 60 MG tablet, Take 60 mg by mouth daily at bedtime , Disp: , Rfl:   •  gabapentin (NEURONTIN) 300 mg capsule, Take 1 tab in morning and 2 tabs at bedtime. , Disp: 90 capsule, Rfl: 4  •  levothyroxine 50 mcg tablet, Take 50 mcg by mouth daily, Disp: , Rfl:   •  losartan (COZAAR) 25 mg tablet, Take 25 mg by mouth daily at bedtime, Disp: , Rfl:   •  modafinil (PROVIGIL) 100 mg tablet, Take 1 tablet (100 mg total) by mouth daily, Disp: 30 tablet, Rfl: 3  •  montelukast (SINGULAIR) 10 mg tablet, Take 10 mg by mouth daily at bedtime , Disp: , Rfl:   •  Multiple Vitamins-Minerals (multivitamin with minerals) tablet, Take 1 tablet by mouth daily, Disp: , Rfl:   •  rosuvastatin (CRESTOR) 10 MG tablet, Take 10 mg by mouth daily at bedtime , Disp: , Rfl:   •  Erythromycin 2 % PADS, Apply 1 Application topically 2 (two) times a day for 14 days, Disp: 60 each, Rfl: 0  •  fluticasone (FLONASE) 50 mcg/act nasal spray, 50 mcg into each nostril daily, Disp: , Rfl:   •  oxyCODONE (Roxicodone) 5 immediate release tablet, Take 1 tablet (5 mg total) by mouth every 4 (four) hours as needed for moderate pain for up to 15 doses Max Daily Amount: 30 mg (Patient not taking: Reported on 7/24/2023), Disp: 15 tablet, Rfl: 0  •  Teriflunomide (Aubagio) 14 MG TABS, Take 1 tablet (14 mg total) by mouth in the morning (Patient not taking: Reported on 6/28/2023), Disp: 30 tablet, Rfl: 3  •  Teriflunomide (Aubagio) 7 MG TABS, Take 1 tablet (7 mg total) by mouth in the morning For first 2 weeks. (Patient not taking: Reported on 6/28/2023), Disp: 14 tablet, Rfl: 0  •  triamcinolone (KENALOG) 0.025 % cream, Apply topically 2 (two) times a day for 14 days, Disp: 80 g, Rfl: 0    No Known Allergies    Objective:  Vitals:    07/24/23 1123   BP: 135/75   Pulse: 77   Temp: 98.4 °F (36.9 °C)     Pain Score:   7      Right Ankle Exam     Tenderness   Right ankle tenderness location: peroneal tendons. Base 5th metatarsal. ATFL. Swelling: mild (lateral ankle)    Range of Motion   Dorsiflexion: 25   Plantar flexion: 40     Muscle Strength   Peroneal muscle:  5/5    Tests   Anterior drawer: negative    Other   Erythema: absent  Scars: present  Sensation: normal  Pulse: present           Observations     Right Ankle/Foot   Positive for adhesive scar. Physical Exam  Constitutional:       General: She is not in acute distress. Appearance: She is well-developed. HENT:      Head: Normocephalic and atraumatic. Eyes:      General: No scleral icterus. Conjunctiva/sclera: Conjunctivae normal.   Neck:      Vascular: No JVD. Cardiovascular:      Rate and Rhythm: Normal rate. Pulmonary:      Effort: Pulmonary effort is normal. No respiratory distress. Skin:     General: Skin is warm. Neurological:      Mental Status: She is alert and oriented to person, place, and time.       Coordination: Coordination normal.         I have personally reviewed pertinent films in PACS and my interpretation is as follows:  MRI right foot: No stress fracture or tendon tear. Edema in abductor digiti minimi muscle. This document was created using speech voice recognition software. Grammatical errors, random word insertions, pronoun errors, and incomplete sentences are an occasional consequence of this system due to software limitations, ambient noise, and hardware issues. Any formal questions or concerns about content, text, or information contained within the body of this dictation should be directly addressed to the provider for clarification.

## 2023-08-22 ENCOUNTER — APPOINTMENT (OUTPATIENT)
Dept: RADIOLOGY | Facility: CLINIC | Age: 53
End: 2023-08-22
Payer: COMMERCIAL

## 2023-08-22 ENCOUNTER — OFFICE VISIT (OUTPATIENT)
Dept: OBGYN CLINIC | Facility: CLINIC | Age: 53
End: 2023-08-22
Payer: COMMERCIAL

## 2023-08-22 VITALS
HEART RATE: 80 BPM | BODY MASS INDEX: 33.67 KG/M2 | SYSTOLIC BLOOD PRESSURE: 126 MMHG | WEIGHT: 228 LBS | DIASTOLIC BLOOD PRESSURE: 85 MMHG

## 2023-08-22 DIAGNOSIS — M25.562 LEFT KNEE PAIN, UNSPECIFIED CHRONICITY: Primary | ICD-10-CM

## 2023-08-22 DIAGNOSIS — M25.562 LEFT KNEE PAIN, UNSPECIFIED CHRONICITY: ICD-10-CM

## 2023-08-22 DIAGNOSIS — M17.12 PRIMARY OSTEOARTHRITIS OF LEFT KNEE: ICD-10-CM

## 2023-08-22 PROCEDURE — 20610 DRAIN/INJ JOINT/BURSA W/O US: CPT | Performed by: PHYSICIAN ASSISTANT

## 2023-08-22 PROCEDURE — 99214 OFFICE O/P EST MOD 30 MIN: CPT | Performed by: PHYSICIAN ASSISTANT

## 2023-08-22 PROCEDURE — 73562 X-RAY EXAM OF KNEE 3: CPT

## 2023-08-22 RX ORDER — LIDOCAINE HYDROCHLORIDE 10 MG/ML
4 INJECTION, SOLUTION INFILTRATION; PERINEURAL
Status: COMPLETED | OUTPATIENT
Start: 2023-08-22 | End: 2023-08-22

## 2023-08-22 RX ORDER — DEXAMETHASONE SODIUM PHOSPHATE 10 MG/ML
40 INJECTION, SOLUTION INTRAMUSCULAR; INTRAVENOUS
Status: COMPLETED | OUTPATIENT
Start: 2023-08-22 | End: 2023-08-22

## 2023-08-22 RX ADMIN — LIDOCAINE HYDROCHLORIDE 4 ML: 10 INJECTION, SOLUTION INFILTRATION; PERINEURAL at 11:45

## 2023-08-22 RX ADMIN — DEXAMETHASONE SODIUM PHOSPHATE 40 MG: 10 INJECTION, SOLUTION INTRAMUSCULAR; INTRAVENOUS at 11:45

## 2023-08-22 NOTE — PROGRESS NOTES
Assessment/Plan:  1. Left knee pain, unspecified chronicity  XR knee 3 vw left non injury      2. Primary osteoarthritis of left knee          The patient does have some arthritis about the left knee. I did provide her with a steroid injection today as well as a physician directed home exercise program.  She may ice and take over-the-counter medications as needed for discomfort. If she fails make progress, we did discuss MRI to rule out medial meniscus tear. If doing well, she will follow-up as needed. Subjective:   Kishan Cuellar is a 46 y.o. female who presents today for evaluation of her left knee. She notes she recently started with pain about the left knee as well as swelling. She denies any specific injury prior to the onset of her symptoms but notes she may have tweaked this knee  wearing some sandals that were worn out. She notes pain about the medial aspect of the knee, which is worse with activity and better with rest.  As she was having some lower leg swelling initially as well she did have a vascular ultrasound in an outside network which showed no evidence of DVT. The patient notes some limitations in flexion due to her swelling but notes good extension of the knee. She denies any paresthesias of the left lower extremity. Review of Systems   Constitutional: Negative. Negative for chills and fever. HENT: Negative. Negative for ear pain and sore throat. Eyes: Negative. Negative for pain and redness. Respiratory: Negative. Negative for shortness of breath and wheezing. Cardiovascular: Negative for chest pain and palpitations. Gastrointestinal: Negative. Negative for abdominal pain and blood in stool. Endocrine: Negative. Negative for polydipsia and polyuria. Genitourinary: Negative. Negative for difficulty urinating and dysuria. Musculoskeletal:        As noted in HPI   Skin: Negative. Negative for pallor and rash. Neurological: Negative.   Negative for dizziness and numbness. Hematological: Negative. Negative for adenopathy. Does not bruise/bleed easily. Psychiatric/Behavioral: Negative. Negative for confusion and suicidal ideas. Past Medical History:   Diagnosis Date   • Allergic    • Ankle arthritis     right is having surgery soon for this   • Cancer [facility]) 2007    cervical   • Disease of thyroid gland     hypo   • GERD (gastroesophageal reflux disease)    • History of transfusion     age 3   • Hyperlipidemia    • Hypertension 04/2022   • MS (congenital mitral stenosis)     patient has multiple sclerosis    • Multiple sclerosis ([address])        Past Surgical History:   Procedure Laterality Date   • CARPAL TUNNEL RELEASE Right    • COLONOSCOPY     • HYSTERECTOMY     • SD ARTHRP INTERPOS INTERCARPAL/METACARPAL JOINTS Right 10/27/2022    Procedure: ARTHROPLASTY THUMB Zula Mutters; CARPOMETACARPAL ARTHROPLASTY RIGHT THUMB;  Surgeon: Madan Espino MD;  Location: [facility];  Service: Orthopedics   • SD ARTHRP INTERPOS INTERCARPAL/METACARPAL JOINTS Left 1/30/2023    Procedure:  Thumb Carpometacarpal Arthroplasty;  Surgeon: Madan Espino MD;  Location: [facility];  Service: Orthopedics   • SD NEUROPLASTY &/TRANSPOS MEDIAN NRV CARPAL Daune Angel Right 9/17/2021    Procedure: RELEASE CARPAL TUNNEL;  Surgeon: Jeferson Rene DO;  Location: [facility];  Service: Orthopedics   • SD OSTEOTOMY CALCANEUS W/WO INTERNAL FIXATION Right 10/20/2021    Procedure: OSTEOTOMY CALCANEUS, Medial slide calcaneal osteotomy, FDL tendon transfer to navicular, Cotton osteotomy, Achilles lengthening, Wilson osteotomy;  Surgeon: Heath Olsen MD;  Location: [facility] MAIN OR;  Service: Orthopedics   • SINUS SURGERY     • WRIST SURGERY Left 2020       Family History   Problem Relation Age of Onset   • No Known Problems Mother        Social History     Occupational History   • Not on file   Tobacco Use   • Smoking status: Former     Packs/day: 0.50     Years: 30.00     Total pack years: 15.00     Types: Cigarettes     Quit date: 4/10/2020     Years since quitting: 3.3   • Smokeless tobacco: Never   Vaping Use   • Vaping Use: Never used   Substance and Sexual Activity   • Alcohol use: Yes     Comment: 1-2 drinks once per week   • Drug use: Never   • Sexual activity: Yes     Partners: Male         Current Outpatient Medications:   •  azelastine (ASTELIN) 0.1 % nasal spray, 2 sprays into each nostril daily, Disp: , Rfl:   •  Calcium Ascorbate (VITAMIN C) 500 mg tablet, Take 500 mg by mouth daily, Disp: , Rfl:   •  cholecalciferol (VITAMIN D3) 1,000 units tablet, Take 1,000 Units by mouth daily, Disp: , Rfl:   •  cyclobenzaprine (FLEXERIL) 10 mg tablet, Take 10 mg by mouth daily at bedtime , Disp: , Rfl:   •  estradiol (ESTRACE) 1 mg tablet, Take 2 mg by mouth daily at bedtime  , Disp: , Rfl:   •  famotidine (PEPCID) 10 mg tablet, Take 10 mg by mouth daily at bedtime, Disp: , Rfl:   •  fexofenadine (ALLEGRA) 60 MG tablet, Take 60 mg by mouth daily at bedtime , Disp: , Rfl:   •  gabapentin (NEURONTIN) 300 mg capsule, Take 1 tab in morning and 2 tabs at bedtime. , Disp: 90 capsule, Rfl: 4  •  levothyroxine 50 mcg tablet, Take 50 mcg by mouth daily, Disp: , Rfl:   •  losartan (COZAAR) 25 mg tablet, Take 25 mg by mouth daily at bedtime, Disp: , Rfl:   •  modafinil (PROVIGIL) 100 mg tablet, Take 1 tablet (100 mg total) by mouth daily, Disp: 30 tablet, Rfl: 3  •  montelukast (SINGULAIR) 10 mg tablet, Take 10 mg by mouth daily at bedtime , Disp: , Rfl:   •  Multiple Vitamins-Minerals (multivitamin with minerals) tablet, Take 1 tablet by mouth daily, Disp: , Rfl:   •  rosuvastatin (CRESTOR) 10 MG tablet, Take 10 mg by mouth daily at bedtime , Disp: , Rfl:   •  Erythromycin 2 % PADS, Apply 1 Application topically 2 (two) times a day for 14 days, Disp: 60 each, Rfl: 0  •  fluticasone (FLONASE) 50 mcg/act nasal spray, 50 mcg into each nostril daily, Disp: , Rfl:   •  oxyCODONE (Roxicodone) 5 immediate release tablet, Take 1 tablet (5 mg total) by mouth every 4 (four) hours as needed for moderate pain for up to 15 doses Max Daily Amount: 30 mg (Patient not taking: Reported on 7/24/2023), Disp: 15 tablet, Rfl: 0  •  Teriflunomide (Aubagio) 14 MG TABS, Take 1 tablet (14 mg total) by mouth in the morning (Patient not taking: Reported on 6/28/2023), Disp: 30 tablet, Rfl: 3  •  Teriflunomide (Aubagio) 7 MG TABS, Take 1 tablet (7 mg total) by mouth in the morning For first 2 weeks. (Patient not taking: Reported on 6/28/2023), Disp: 14 tablet, Rfl: 0  •  triamcinolone (KENALOG) 0.025 % cream, Apply topically 2 (two) times a day for 14 days, Disp: 80 g, Rfl: 0    No Known Allergies    Objective:  Vitals:    08/22/23 1228   BP: 126/85   Pulse: 80            Left Knee Exam     Tenderness   The patient is experiencing tenderness in the medial joint line. Range of Motion   Extension: 0   Flexion: 120     Tests   Varus: negative Valgus: negative  Lachman:  Anterior - negative      Drawer:  Anterior - negative     Posterior - negative    Other   Erythema: absent  Sensation: normal  Pulse: present  Swelling: none  Effusion: no effusion (trace) present          Observations   Left Knee   Negative for effusion (trace). Physical Exam  Constitutional:       General: She is not in acute distress. Appearance: She is well-developed. HENT:      Head: Normocephalic and atraumatic. Eyes:      General: No scleral icterus. Conjunctiva/sclera: Conjunctivae normal.   Neck:      Vascular: No JVD. Cardiovascular:      Rate and Rhythm: Normal rate. Pulmonary:      Effort: Pulmonary effort is normal. No respiratory distress. Musculoskeletal:      Left knee: No effusion (trace). Skin:     General: Skin is warm. Neurological:      Mental Status: She is alert and oriented to person, place, and time.       Coordination: Coordination normal.            I have personally reviewed pertinent films in PACS and my interpretation is as follows:  Xrays left knee: Medial compartment narrowing. No acute osseous abnormality. Large joint arthrocentesis: L knee  Universal Protocol:  Risks and benefits: risks, benefits and alternatives were discussed  Consent given by: patient  Time out: Immediately prior to procedure a "time out" was called to verify the correct patient, procedure, equipment, support staff and site/side marked as required. Timeout called at: 8/22/2023 1:36 PM.  Site marked: the operative site was marked  Supporting Documentation  Indications: pain   Procedure Details  Location: knee - L knee  Preparation: Patient was prepped and draped in the usual sterile fashion  Needle size: 22 G  Ultrasound guidance: no  Approach: anterolateral  Medications administered: 40 mg dexamethasone 100 mg/10 mL; 4 mL lidocaine 1 %    Patient tolerance: patient tolerated the procedure well with no immediate complications  Dressing:  Sterile dressing applied        This x-ray document was created using speech voice recognition software. Grammatical errors, random word insertions, pronoun errors, and incomplete sentences are an occasional consequence of this system due to software limitations, ambient noise, and hardware issues. Any formal questions or concerns about content, text, or information contained within the body of this dictation should be directly addressed to the provider for clarification.

## 2023-08-22 NOTE — PATIENT INSTRUCTIONS
Knee Exercises   AMBULATORY CARE:   What you need to know about knee exercises:  Knee exercises help strengthen the muscles around your knee. Strong muscles can help reduce pain and decrease your risk of future injury. Knee exercises also help you heal after an injury or surgery. These are beginning exercises. Ask your healthcare provider if you need to see a physical therapist for more advanced exercises. General guidelines for knee exercises:   Start slowly. As you get stronger, you may be able to do more sets of each exercise or add weights. Stop if you feel pain. It is normal to feel some discomfort at first, but you should not feel pain. Tell your provider or physical therapist if you have pain while you exercise. Regular exercise will help decrease your discomfort over time. Do the exercises on both legs. Do this so both knees remain strong. Warm up before you do knee exercises. Walk or ride a stationary bike for 5 or 10 minutes to warm your muscles. How to perform knee stretches safely:  Always stretch before you do strengthening exercises. Do these stretching exercises again after you do the strengthening exercises. Do these stretches 4 or 5 days a week, or as directed. Standing calf stretch: Face a wall and place both palms flat on the wall, or hold the back of a chair for balance. Keep a slight bend in your knees. Take a big step backward with one leg. Keep your other leg directly under you. Keep both heels flat and press your hips forward. Hold the stretch for 30 seconds, and then relax for 30 seconds. Switch legs. Repeat 2 or 3 times on each leg. Standing quadriceps stretch:  Stand and place one hand against a wall or hold the back of a chair for balance. With your weight on one leg, bend your other leg and grab your ankle. Bring your heel toward your buttocks. Hold the stretch for 30 to 60 seconds. Switch legs. Repeat 2 or 3 times on each leg.          Sitting hamstring stretch:  Sit with both legs straight in front of you. Do not point or flex your toes. Place your palms on the floor and slide your hands forward until you feel the stretch. Do not round your back. Hold the stretch for 30 seconds. Repeat 2 or 3 times. How to perform knee strengthening exercises safely:  Do these exercises 4 or 5 days a week, or as directed. Standing half squats:  Stand with your feet shoulder-width apart. Lean your back against a wall or hold the back of a chair for balance, if needed. Slowly sit down about 10 inches, as if you are going to sit in a chair. Your body weight should be mostly over your heels. Hold the squat for 5 seconds, then rise to a standing position. Do 3 sets of 10 squats to strengthen your buttocks and thighs. Standing hamstring curls: Face a wall and place both palms flat on the wall, or hold the back of a chair for balance. With your weight on one leg, lift your other foot as close to your buttocks as you can. Hold for 5 seconds and then lower your leg. Do 2 sets of 10 curls on each leg. This exercise strengthens the muscles in the back of your thigh. Standing calf raises:  Face a wall and place both palms flat on the wall, or hold the back of a chair for balance. Stand up straight, and do not lean. Place all your weight on one leg by lifting the other foot off the floor. Raise the heel of the foot that is on the floor as high as you can and then lower it. Do 2 sets of 10 calf raises on each leg to strengthen your calf muscles. Straight leg lifts:  Lie on your stomach with straight legs. Fold your arms in front of you and rest your head in your arms. Tighten your leg muscles and raise one leg as high as you can. Hold for 5 seconds, then lower your leg. Do 2 sets of 10 lifts on each leg to strengthen your buttocks. Sitting leg lifts:  Sit in a chair. Slowly straighten and raise one leg. Squeeze your thigh muscles and hold for 5 seconds. Relax and return your foot to the floor. Do 2 sets of 10 lifts on each leg. This helps strengthen the muscles in the front of your thigh. Call your doctor or physical therapist if:   You have new pain or your pain becomes worse. You have questions or concerns about your condition or care. © Copyright Lonny Ponce 2022 Information is for End User's use only and may not be sold, redistributed or otherwise used for commercial purposes. The above information is an  only. It is not intended as medical advice for individual conditions or treatments. Talk to your doctor, nurse or pharmacist before following any medical regimen to see if it is safe and effective for you.

## 2023-10-17 ENCOUNTER — TELEPHONE (OUTPATIENT)
Dept: NEUROLOGY | Facility: CLINIC | Age: 53
End: 2023-10-17

## 2023-10-31 ENCOUNTER — OFFICE VISIT (OUTPATIENT)
Dept: NEUROLOGY | Facility: CLINIC | Age: 53
End: 2023-10-31
Payer: COMMERCIAL

## 2023-10-31 VITALS
HEART RATE: 81 BPM | TEMPERATURE: 96.5 F | DIASTOLIC BLOOD PRESSURE: 82 MMHG | BODY MASS INDEX: 33.67 KG/M2 | SYSTOLIC BLOOD PRESSURE: 118 MMHG | HEIGHT: 69 IN | OXYGEN SATURATION: 97 %

## 2023-10-31 DIAGNOSIS — G35 MULTIPLE SCLEROSIS (HCC): Primary | ICD-10-CM

## 2023-10-31 DIAGNOSIS — R53.83 FATIGUE: ICD-10-CM

## 2023-10-31 DIAGNOSIS — D32.9 MENINGIOMA (HCC): ICD-10-CM

## 2023-10-31 PROCEDURE — 99214 OFFICE O/P EST MOD 30 MIN: CPT | Performed by: PSYCHIATRY & NEUROLOGY

## 2023-10-31 RX ORDER — NAPROXEN 500 MG/1
500 TABLET ORAL
COMMUNITY
Start: 2023-08-10 | End: 2024-08-09

## 2023-10-31 NOTE — PROGRESS NOTES
Return NeuroOutpatient Note        Refugio Saez  [id number]  48 y.o.  1970       Meningioma, Multiple Sclerosis, Fatigue, and Peripheral Neuropathy        History obtained from:  Patient and      HPI/Subjective:    Refugio Saez is a 49 yo F with PMH of MS, meningioma, peripheral neuropathy presents as f/u. She was diagnosed in 2003. Per my previous history, patient took copaxone for 2 years. She was getting bruises at each site and eventually tried natural, herbal therapy. She was noticing forgetfulness, word finding difficulty. She was thought to have Lyme and still wasn't getting better after abx so imaging finally showed demyelinating lesions. She had LP done to confirm it. Initially, she had difficulty writing with right hand, vertigo which are her typical symptoms. With flare ups, she gets crawling sensation. Stress, physical exhaustion can be triggers. She can get fatigue easily. She avoids uneven surfaces. She used to get paresthesia but hasn't in a long time. Stress can make her eyes fatigued. Patient had MRI brain IAC in sept of 2021 which revealed prior lesions but no significant change. We had repeat MRI brain in feb of 2023 which revealed non specific white matter lesions likely from [address] [facility] with no active lesions. She has stable 0.8cm anterior falcine meningioma. At previous visit, she had expressed trying oral DMD. We had prescribed Aubagio but she had a lot of personal issues since with family members that didn't get chance to start it. Her normal pharmacy wasn't able to give [facility]. She denies any MS flare ups. Currently she's in wheel chair only because had left knee meniscus repair and can't bear weight. She was on provigil for fatigue. She's not on it anymore. she does feel she could use it again. She's on neurontin 300mg tid. She takes mobic as needed.                Past Medical History:   Diagnosis Date   • Allergic    • Ankle arthritis right is having surgery soon for this   • Cancer [facility]) 2007    cervical   • Disease of thyroid gland     hypo   • GERD (gastroesophageal reflux disease)    • History of transfusion     age 3   • Hyperlipidemia    • Hypertension 04/2022   • MS (congenital mitral stenosis)     patient has multiple sclerosis    • Multiple sclerosis ([address])      Social History     Socioeconomic History   • Marital status:      Spouse name: Not on file   • Number of children: Not on file   • Years of education: Not on file   • Highest education level: Not on file   Occupational History   • Not on file   Tobacco Use   • Smoking status: Former     Packs/day: 0.50     Years: 30.00     Total pack years: 15.00     Types: Cigarettes     Quit date: 4/10/2020     Years since quitting: 3.5   • Smokeless tobacco: Never   Vaping Use   • Vaping Use: Never used   Substance and Sexual Activity   • Alcohol use: Yes     Comment: 1-2 drinks once per week   • Drug use: Never   • Sexual activity: Yes     Partners: Male   Other Topics Concern   • Not on file   Social History Narrative   • Not on file     Social Determinants of Health     Financial Resource Strain: Not on file   Food Insecurity: Not on file   Transportation Needs: Not on file   Physical Activity: Not on file   Stress: Not on file   Social Connections: Not on file   Intimate Partner Violence: Not on file   Housing Stability: Not on file     Family History   Problem Relation Age of Onset   • No Known Problems Mother      No Known Allergies  Current Outpatient Medications on File Prior to Visit   Medication Sig Dispense Refill   • azelastine (ASTELIN) 0.1 % nasal spray 2 sprays into each nostril daily     • cholecalciferol (VITAMIN D3) 1,000 units tablet Take 1,000 Units by mouth daily     • conjugated estrogens (PREMARIN) 0.9 MG tablet Take 0.9 mg by mouth daily Take daily for 21 days then do not take for 7 days.      • cyclobenzaprine (FLEXERIL) 10 mg tablet Take 10 mg by mouth daily at bedtime      • famotidine (PEPCID) 10 mg tablet Take 10 mg by mouth daily at bedtime     • fexofenadine (ALLEGRA) 60 MG tablet Take 60 mg by mouth daily at bedtime      • fluticasone (FLONASE) 50 mcg/act nasal spray 50 mcg into each nostril daily     • gabapentin (NEURONTIN) 300 mg capsule Take 1 tab in morning and 2 tabs at bedtime. 90 capsule 4   • levothyroxine 50 mcg tablet Take 50 mcg by mouth daily     • losartan (COZAAR) 25 mg tablet Take 25 mg by mouth daily at bedtime     • modafinil (PROVIGIL) 100 mg tablet Take 1 tablet (100 mg total) by mouth daily 30 tablet 3   • montelukast (SINGULAIR) 10 mg tablet Take 10 mg by mouth daily at bedtime      • Multiple Vitamins-Minerals (multivitamin with minerals) tablet Take 1 tablet by mouth daily     • naproxen (NAPROSYN) 500 mg tablet Take 500 mg by mouth     • Calcium Ascorbate (VITAMIN C) 500 mg tablet Take 500 mg by mouth daily (Patient not taking: Reported on 10/31/2023)     • Erythromycin 2 % PADS Apply 1 Application topically 2 (two) times a day for 14 days (Patient not taking: Reported on 10/31/2023) 60 each 0   • estradiol (ESTRACE) 1 mg tablet Take 2 mg by mouth daily at bedtime   (Patient not taking: Reported on 10/31/2023)     • oxyCODONE (Roxicodone) 5 immediate release tablet Take 1 tablet (5 mg total) by mouth every 4 (four) hours as needed for moderate pain for up to 15 doses Max Daily Amount: 30 mg (Patient not taking: Reported on 7/24/2023) 15 tablet 0   • rosuvastatin (CRESTOR) 10 MG tablet Take 10 mg by mouth daily at bedtime  (Patient not taking: Reported on 10/31/2023)     • Teriflunomide (Aubagio) 14 MG TABS Take 1 tablet (14 mg total) by mouth in the morning (Patient not taking: Reported on 6/28/2023) 30 tablet 3   • Teriflunomide (Aubagio) 7 MG TABS Take 1 tablet (7 mg total) by mouth in the morning For first 2 weeks.  (Patient not taking: Reported on 6/28/2023) 14 tablet 0   • triamcinolone (KENALOG) 0.025 % cream Apply topically 2 (two) times a day for 14 days (Patient not taking: Reported on 10/31/2023) 80 g 0     No current facility-administered medications on file prior to visit. Review of Systems   Refer to positive review of systems in HPI. Review of Systems    Constitutional- No fever  Eyes- No visual change  ENT- Hearing normal  CV- No chest pain  Resp- No Shortness of breath  GI- No diarrhea  - Bladder normal  MS- No Arthritis   Skin- No rash  Psych- No depression  Endo- No DM  Heme- No nodes    Vitals:    10/31/23 1132   BP: 118/82   BP Location: Left arm   Patient Position: Sitting   Cuff Size: Standard   Pulse: 81   Temp: (!) 96.5 °F (35.8 °C)   TempSrc: Tympanic   SpO2: 97%   Height: 5' 9" (1.753 m)       PHYSICAL EXAM:  Appearance: No Acute Distress  Ophthalmoscopic: Disc Flat, Normal fundus  Mental status:  Orientation: Awake, Alert, and Orientedx3  Memory: Registation 3/3 Recall 3/3  Attention: normal  Knowledge: good  Language: No aphasia  Speech: No dysarthria  Cranial Nerves:  2 No Visual Defect on Confrontation, Pupils round, equal, reactive to light  3,4,6 Extraocular Movements Intact, no nystagmus  5 Facial Sensation Intact  7 No facial asymmetry  8 Intact hearing  9,10 Palate symmetric, normal gag  11 Good shoulder shrug  12 Tongue Midline  Gait: meera. Can't bear weight.    Coordination: No ataxia with finger to nose testing, and heel to shin  Sensory: Intact, Symmetric to pinprick, light touch, vibration, and joint position  Muscle Tone: Normal              Muscle exam:  Arm Right Left Leg Right Left   Deltoid 5/5 5/5 Iliopsoas 5/5 5/5   Biceps 5/5 5/5 Quads 5/5 5/5   Triceps 5/5 5/5 Hamstrings 5/5 5/5   Wrist Extension 5/5 5/5 Ankle Dorsi Flexion 5/5 5/5   Wrist Flexion 5/5 5/5 Ankle Plantar Flexion 5/5 5/5   Interossei 5/5 5/5 Ankle Eversion 5/5 5/5   APB 5/5 5/5 Ankle Inversion 5/5 5/5       Reflexes   RJ BJ TJ KJ AJ Plantars Guerra's   Right 2+ 2+ 2+ 2+  Downgoing Not present   Left 2+ 2+ 2+ 2+  Downgoing Not present     Personal review of  Labs:                    Diagnoses and all orders for this visit:      1. Multiple sclerosis ([address])        2. Meningioma ([address])        3. Fatigue          Patient is stable clinically. She will start aubagio soon. It has been ordered since previous visit. She's on provigil 100mg daily for fatigue. She does feel it helps. Encouraged PT when able.                  Total time of encounter:  30 min  More than 50% of the time was used in counseling and/or coordination of care  Extent of counseling and/or coordination of care        [facility]  [address]  [phone]

## 2023-11-08 DIAGNOSIS — R53.83 FATIGUE: ICD-10-CM

## 2023-11-08 RX ORDER — MODAFINIL 100 MG/1
100 TABLET ORAL DAILY
Qty: 30 TABLET | Refills: 2 | Status: SHIPPED | OUTPATIENT
Start: 2023-11-08

## 2024-02-20 ENCOUNTER — TELEPHONE (OUTPATIENT)
Dept: NEUROLOGY | Facility: CLINIC | Age: 54
End: 2024-02-20

## 2024-09-16 ENCOUNTER — APPOINTMENT (EMERGENCY)
Dept: RADIOLOGY | Facility: HOSPITAL | Age: 54
End: 2024-09-16
Payer: COMMERCIAL

## 2024-09-16 ENCOUNTER — HOSPITAL ENCOUNTER (EMERGENCY)
Facility: HOSPITAL | Age: 54
Discharge: HOME | End: 2024-09-16
Attending: STUDENT IN AN ORGANIZED HEALTH CARE EDUCATION/TRAINING PROGRAM
Payer: COMMERCIAL

## 2024-09-16 VITALS
OXYGEN SATURATION: 95 % | HEART RATE: 92 BPM | TEMPERATURE: 97.1 F | RESPIRATION RATE: 18 BRPM | SYSTOLIC BLOOD PRESSURE: 164 MMHG | DIASTOLIC BLOOD PRESSURE: 80 MMHG

## 2024-09-16 DIAGNOSIS — J18.9 PNEUMONIA: Primary | ICD-10-CM

## 2024-09-16 LAB
2HR DELTA HS TROPONIN: 1 NG/L
ANION GAP SERPL CALCULATED.3IONS-SCNC: 9 MMOL/L (ref 4–13)
BASOPHILS # BLD AUTO: 0.03 THOUSANDS/ΜL (ref 0–0.1)
BASOPHILS NFR BLD AUTO: 0 % (ref 0–1)
BUN SERPL-MCNC: 10 MG/DL (ref 5–25)
CALCIUM SERPL-MCNC: 9 MG/DL (ref 8.4–10.2)
CARDIAC TROPONIN I PNL SERPL HS: 5 NG/L
CARDIAC TROPONIN I PNL SERPL HS: 6 NG/L
CHLORIDE SERPL-SCNC: 102 MMOL/L (ref 96–108)
CO2 SERPL-SCNC: 26 MMOL/L (ref 21–32)
CREAT SERPL-MCNC: 0.63 MG/DL (ref 0.6–1.3)
EOSINOPHIL # BLD AUTO: 0.07 THOUSAND/ΜL (ref 0–0.61)
EOSINOPHIL NFR BLD AUTO: 1 % (ref 0–6)
ERYTHROCYTE [DISTWIDTH] IN BLOOD BY AUTOMATED COUNT: 13.2 % (ref 11.6–15.1)
FLUAV AG UPPER RESP QL IA.RAPID: NEGATIVE
FLUBV AG UPPER RESP QL IA.RAPID: NEGATIVE
GFR SERPL CREATININE-BSD FRML MDRD: 102 ML/MIN/1.73SQ M
GLUCOSE SERPL-MCNC: 116 MG/DL (ref 65–140)
HCT VFR BLD AUTO: 36.5 % (ref 34.8–46.1)
HGB BLD-MCNC: 11.9 G/DL (ref 11.5–15.4)
IMM GRANULOCYTES # BLD AUTO: 0.03 THOUSAND/UL (ref 0–0.2)
IMM GRANULOCYTES NFR BLD AUTO: 0 % (ref 0–2)
LYMPHOCYTES # BLD AUTO: 1.96 THOUSANDS/ΜL (ref 0.6–4.47)
LYMPHOCYTES NFR BLD AUTO: 25 % (ref 14–44)
MCH RBC QN AUTO: 30.4 PG (ref 26.8–34.3)
MCHC RBC AUTO-ENTMCNC: 32.6 G/DL (ref 31.4–37.4)
MCV RBC AUTO: 93 FL (ref 82–98)
MONOCYTES # BLD AUTO: 0.74 THOUSAND/ΜL (ref 0.17–1.22)
MONOCYTES NFR BLD AUTO: 10 % (ref 4–12)
NEUTROPHILS # BLD AUTO: 4.96 THOUSANDS/ΜL (ref 1.85–7.62)
NEUTS SEG NFR BLD AUTO: 64 % (ref 43–75)
NRBC BLD AUTO-RTO: 0 /100 WBCS
PLATELET # BLD AUTO: 211 THOUSANDS/UL (ref 149–390)
PMV BLD AUTO: 10.9 FL (ref 8.9–12.7)
POTASSIUM SERPL-SCNC: 3.6 MMOL/L (ref 3.5–5.3)
RBC # BLD AUTO: 3.92 MILLION/UL (ref 3.81–5.12)
S PYO DNA THROAT QL NAA+PROBE: NOT DETECTED
SARS-COV+SARS-COV-2 AG RESP QL IA.RAPID: NEGATIVE
SODIUM SERPL-SCNC: 137 MMOL/L (ref 135–147)
WBC # BLD AUTO: 7.79 THOUSAND/UL (ref 4.31–10.16)

## 2024-09-16 PROCEDURE — 80048 BASIC METABOLIC PNL TOTAL CA: CPT | Performed by: STUDENT IN AN ORGANIZED HEALTH CARE EDUCATION/TRAINING PROGRAM

## 2024-09-16 PROCEDURE — 87804 INFLUENZA ASSAY W/OPTIC: CPT | Performed by: STUDENT IN AN ORGANIZED HEALTH CARE EDUCATION/TRAINING PROGRAM

## 2024-09-16 PROCEDURE — 99284 EMERGENCY DEPT VISIT MOD MDM: CPT

## 2024-09-16 PROCEDURE — 36415 COLL VENOUS BLD VENIPUNCTURE: CPT | Performed by: STUDENT IN AN ORGANIZED HEALTH CARE EDUCATION/TRAINING PROGRAM

## 2024-09-16 PROCEDURE — 85025 COMPLETE CBC W/AUTO DIFF WBC: CPT | Performed by: STUDENT IN AN ORGANIZED HEALTH CARE EDUCATION/TRAINING PROGRAM

## 2024-09-16 PROCEDURE — 87811 SARS-COV-2 COVID19 W/OPTIC: CPT | Performed by: STUDENT IN AN ORGANIZED HEALTH CARE EDUCATION/TRAINING PROGRAM

## 2024-09-16 PROCEDURE — 71045 X-RAY EXAM CHEST 1 VIEW: CPT

## 2024-09-16 PROCEDURE — 96365 THER/PROPH/DIAG IV INF INIT: CPT

## 2024-09-16 PROCEDURE — 93005 ELECTROCARDIOGRAM TRACING: CPT

## 2024-09-16 PROCEDURE — 99284 EMERGENCY DEPT VISIT MOD MDM: CPT | Performed by: STUDENT IN AN ORGANIZED HEALTH CARE EDUCATION/TRAINING PROGRAM

## 2024-09-16 PROCEDURE — 87651 STREP A DNA AMP PROBE: CPT | Performed by: STUDENT IN AN ORGANIZED HEALTH CARE EDUCATION/TRAINING PROGRAM

## 2024-09-16 PROCEDURE — 84484 ASSAY OF TROPONIN QUANT: CPT | Performed by: STUDENT IN AN ORGANIZED HEALTH CARE EDUCATION/TRAINING PROGRAM

## 2024-09-16 RX ORDER — ACETAMINOPHEN 10 MG/ML
1000 INJECTION, SOLUTION INTRAVENOUS ONCE
Status: COMPLETED | OUTPATIENT
Start: 2024-09-16 | End: 2024-09-16

## 2024-09-16 RX ORDER — FLUCONAZOLE 150 MG/1
150 TABLET ORAL ONCE
Qty: 1 TABLET | Refills: 0 | Status: SHIPPED | OUTPATIENT
Start: 2024-09-16 | End: 2024-09-16

## 2024-09-16 RX ORDER — DOXYCYCLINE 100 MG/1
100 CAPSULE ORAL ONCE
Status: COMPLETED | OUTPATIENT
Start: 2024-09-16 | End: 2024-09-16

## 2024-09-16 RX ORDER — DOXYCYCLINE 100 MG/1
100 CAPSULE ORAL 2 TIMES DAILY
Qty: 10 CAPSULE | Refills: 0 | Status: SHIPPED | OUTPATIENT
Start: 2024-09-16 | End: 2024-09-21

## 2024-09-16 RX ADMIN — ACETAMINOPHEN 1000 MG: 10 INJECTION INTRAVENOUS at 17:43

## 2024-09-16 RX ADMIN — DOXYCYCLINE 100 MG: 100 CAPSULE ORAL at 20:02

## 2024-09-16 RX ADMIN — SODIUM CHLORIDE 1000 ML: 0.9 INJECTION, SOLUTION INTRAVENOUS at 17:23

## 2024-09-16 NOTE — DISCHARGE INSTRUCTIONS
You were seen in the emergency department for flulike symptoms.  You will be started on antibiotics.  Please take as prescribed.  Please follow-up with your family doctor.  Return to the emergency room for trouble breathing, chest pain, fevers, or for any other new or concerning symptoms.

## 2024-09-17 LAB
ATRIAL RATE: 78 BPM
P AXIS: 58 DEGREES
PR INTERVAL: 134 MS
QRS AXIS: 44 DEGREES
QRSD INTERVAL: 96 MS
QT INTERVAL: 376 MS
QTC INTERVAL: 428 MS
T WAVE AXIS: 47 DEGREES
VENTRICULAR RATE: 78 BPM

## 2024-09-17 PROCEDURE — 93010 ELECTROCARDIOGRAM REPORT: CPT | Performed by: INTERNAL MEDICINE

## 2024-09-17 NOTE — ED PROVIDER NOTES
1. Pneumonia      ED Disposition       ED Disposition   Discharge    Condition   Stable    Date/Time   Mon Sep 16, 2024  7:02 PM    Comment   Roxi Rudolph discharge to home/self care.                   Assessment & Plan       Medical Decision Making  Amount and/or Complexity of Data Reviewed  Labs: ordered.  Radiology: ordered and independent interpretation performed.    Risk  Prescription drug management.      Patient is a 53 y.o. female who presents to the ED for viral URI symptoms.  Patient is nontoxic and well-appearing.  Vitals are stable.  Physical exam is unremarkable.     Differential includes but is not limited to: Viral URI, pneumonia, sinusitis.  Presentation not consistent with venom secondary to caterpillar    Plan: Labs, chest x-ray, symptomatic treatment, reassess                 HEART Risk Score      Flowsheet Row Most Recent Value   Heart Score Risk Calculator    History 0 Filed at: 09/16/2024 2314   ECG 0 Filed at: 09/16/2024 2314   Age 1 Filed at: 09/16/2024 2314   Risk Factors 1 Filed at: 09/16/2024 2314   Troponin 0 Filed at: 09/16/2024 2314   HEART Score 2 Filed at: 09/16/2024 2314               ED Course as of 09/16/24 2315   Mon Sep 16, 2024   2000 Patient reevaluated.  Resting comfortably.  Feeling better.  Discussed treating for pneumonia.  Patient in agreement. Will discharge.  Return precautions discussed.  Patient verbalized understanding and agreed to plan of care.       Medications   sodium chloride 0.9 % bolus 1,000 mL (0 mL Intravenous Stopped 9/16/24 1913)   acetaminophen (Ofirmev) injection 1,000 mg (0 mg Intravenous Stopped 9/16/24 1913)   doxycycline hyclate (VIBRAMYCIN) capsule 100 mg (100 mg Oral Given 9/16/24 2002)       History of Present Illness         Flu Symptoms  Presenting symptoms: cough and headache    Associated symptoms: chills        Patient is a 53-year-old female who presents to the emergency department for headache, sneezing, cough, chills.  Symptoms started  about 6 days ago.  No modifying factors.  No other associated symptoms.  Felt febrile but did not check a temperature.  No shortness of breath.  Feels lots of pressure in her sinuses.  States she came into a contact with a caterpillar a couple weeks ago on her right side of her neck and is wondering if that may have been related no other complaints or concerns.    Review of Systems   Constitutional:  Positive for chills.   HENT:  Positive for sneezing.    Respiratory:  Positive for cough.    Neurological:  Positive for headaches.   All other systems reviewed and are negative.          Objective     ED Triage Vitals [09/16/24 1620]   Temperature Pulse Blood Pressure Respirations SpO2 Patient Position - Orthostatic VS   (!) 97.1 °F (36.2 °C) 92 164/80 18 95 % --      Temp Source Heart Rate Source BP Location FiO2 (%) Pain Score    Temporal Monitor -- -- --        Physical Exam  Vitals and nursing note reviewed.   Constitutional:       General: She is not in acute distress.     Appearance: She is well-developed. She is not ill-appearing, toxic-appearing or diaphoretic.   HENT:      Head: Normocephalic and atraumatic.      Right Ear: External ear normal.      Left Ear: External ear normal.      Nose: Nose normal.   Eyes:      General: Lids are normal. No scleral icterus.  Cardiovascular:      Rate and Rhythm: Normal rate and regular rhythm.   Pulmonary:      Effort: Pulmonary effort is normal. No respiratory distress.   Abdominal:      Palpations: Abdomen is soft.      Tenderness: There is no abdominal tenderness. There is no guarding or rebound.   Musculoskeletal:         General: No deformity. Normal range of motion.      Cervical back: Normal range of motion and neck supple.   Skin:     General: Skin is warm and dry.   Neurological:      General: No focal deficit present.      Mental Status: She is alert.   Psychiatric:         Mood and Affect: Mood normal.         Behavior: Behavior normal.         Labs Reviewed    COVID-19/INFLUENZA A/B RAPID ANTIGEN (30 MIN.TAT) - Normal       Result Value    SARS COV Rapid Antigen Negative      Influenza A Rapid Antigen Negative      Influenza B Rapid Antigen Negative      Narrative:     This test has been performed using the [employer] Charline 2 FLU+SARS Antigen test under the Emergency Use Authorization (EUA). This test has been validated by the  and verified by the performing laboratory. The Charline uses lateral flow immunofluorescent sandwich assay to detect SARS-COV, Influenza A and Influenza B Antigen.     The Quidel Charline 2 SARS Antigen test does not differentiate between SARS-CoV and SARS-CoV-2.     Negative results are presumptive and may be confirmed with a molecular assay, if necessary, for patient management. Negative results do not rule out SARS-CoV-2 or influenza infection and should not be used as the sole basis for treatment or patient management decisions. A negative test result may occur if the level of antigen in a sample is below the limit of detection of this test.     Positive results are indicative of the presence of viral antigens, but do not rule out bacterial infection or co-infection with other viruses.     All test results should be used as an adjunct to clinical observations and other information available to the provider.    FOR PEDIATRIC PATIENTS - copy/paste COVID Guidelines URL to browser: [email or web address]   STREP A PCR - Normal    STREP A PCR Not Detected     HS TROPONIN I 0HR - Normal    hs TnI 0hr 5     HS TROPONIN I 2HR - Normal    hs TnI 2hr 6      Delta 2hr hsTnI 1     CBC AND DIFFERENTIAL    WBC 7.79      RBC 3.92      Hemoglobin 11.9      Hematocrit 36.5      MCV 93      MCH 30.4      MCHC 32.6      RDW 13.2      MPV 10.9      Platelets 211      nRBC 0      Segmented % 64      Immature Grans % 0      Lymphocytes % 25      Monocytes % 10      Eosinophils Relative 1      Basophils Relative  0      Absolute Neutrophils 4.96      Absolute Immature Grans 0.03      Absolute Lymphocytes 1.96      Absolute Monocytes 0.74      Eosinophils Absolute 0.07      Basophils Absolute 0.03     BASIC METABOLIC PANEL    Sodium 137      Potassium 3.6      Chloride 102      CO2 26      ANION GAP 9      BUN 10      Creatinine 0.63      Glucose 116      Calcium 9.0      eGFR 102      Narrative:     National Kidney Disease Foundation guidelines for Chronic Kidney Disease (CKD):     Stage 1 with normal or high GFR (GFR > 90 mL/min/1.73 square meters)    Stage 2 Mild CKD (GFR = 60-89 mL/min/1.73 square meters)    Stage 3A Moderate CKD (GFR = 45-59 mL/min/1.73 square meters)    Stage 3B Moderate CKD (GFR = 30-44 mL/min/1.73 square meters)    Stage 4 Severe CKD (GFR = 15-29 mL/min/1.73 square meters)    Stage 5 End Stage CKD (GFR <15 mL/min/1.73 square meters)  Note: GFR calculation is accurate only with a steady state creatinine     XR chest 1 view portable   ED Interpretation by Darwin Baker DO (09/16 1750)   Hazy opacity left lower lung?          ECG 12 Lead Documentation Only    Date/Time: 9/16/2024 11:14 PM    Performed by: Darwin Baker DO  Authorized by: Darwin Baker DO    ECG reviewed by me, the ED Provider: yes    Patient location:  ED  Interpretation:     Interpretation: normal    Rate:     ECG rate assessment: normal    Rhythm:     Rhythm: sinus rhythm    Ectopy:     Ectopy: none    QRS:     QRS axis:  Normal  Conduction:     Conduction: normal    ST segments:     ST segments:  Normal  T waves:     T waves: normal    ECG 12 Lead Documentation Only    Date/Time: 9/16/2024 11:14 PM    Performed by: Darwin Baker DO  Authorized by: Darwin Baker DO    ECG reviewed by me, the ED Provider: yes    Patient location:  ED  Interpretation:     Interpretation: normal    Rate:     ECG rate:  78    ECG rate assessment: normal    Rhythm:     Rhythm: sinus rhythm    Ectopy:     Ectopy: none    QRS:     QRS  axis:  Normal  Conduction:     Conduction: normal    ST segments:     ST segments:  Normal  T waves:     T waves: normal           Darwin Baker,   09/16/24 2313       Darwin Baker,   09/16/24 2315

## 2024-11-05 NOTE — PROGRESS NOTES
Daily Note     Today's date: 2022  Patient name: Glenis Peñaloza  : 1970  MRN: [id number]  Referring provider: Jt Gordon*  Dx:   Encounter Diagnosis     ICD-10-CM    1  Arthritis of carpometacarpal (CMC) joint of right thumb  M18 11         Start Time: 9451  Stop Time: 1500  Total time in clinic (min): 45 minutes    Subjective: Patient reports consistency with HEP and pleased with increased ROM/use of RUE for daily activities  Objective: See treatment diary below     Raul Hidalgo 35 req  Auth Status Total   Visits  Expiration date Co-Insurance   approved 23                              Visit Tracker  Date  IE                                                                              PMHx:   has a past medical history of Ankle arthritis, Cancer ([facility] [address] ) (), Disease of thyroid gland, GERD (gastroesophageal reflux disease), History of transfusion, Hyperlipidemia, MS (congenital mitral stenosis), and Multiple sclerosis ([facility] [address] )  Precautions: Sx 10/27/22     Manuals HEP 2022                        Ther Ex     Education on HEP and dx x5 min  x5min    Towel scrunch w/ ext x10 3 sec hold x10 3 sec hold   AROM wrist flex/ext/RD/UD x10 x10   AROM thumb, all planes x10 x10   AROM thumb opposition w/ slides x10 x10   AROM forearm rotation (sup/pro) x10 x10   PROM wrist flex/ext x3 10 sec hold x3 10 sec hold    STM  x5 min    Digit ext band  x10 yellow   Flexbar (all planes)  x10 red   TheraPutty (/pinch)  x10 yellow             Ther act     Towel scrunch   x5   Juxtaciser  x10   Wrist rotator  x10   Dice, opposition  x25   Dice, in-hand manipulation  x25   Iron balls  x3 min    Sponge squeeze  x10 blue   Gripper  x10 level 3             Modalities     MHP 5 min  5 min           Assessment:   Patient tolerated session well  Session focused on HEP, ROM, and patient education to improve functional task performance with daily activities   Patient progressing well towards with continued tolerance for all TE/TA with no complaints  New exercises implemented at this visit and TheraPutty also given to be added to HEP  Patient benefiting from skilled hand therapy OT to reduce deficits to improve independence with daily activities  Plan:   Focus on ROM to improve ability to complete daily activites with ease    POC 11/17/2022 - 2/8/2023 No

## 2025-01-25 ENCOUNTER — HOSPITAL ENCOUNTER (EMERGENCY)
Facility: HOSPITAL | Age: 55
Discharge: HOME | End: 2025-01-25
Payer: COMMERCIAL

## 2025-01-25 VITALS
DIASTOLIC BLOOD PRESSURE: 94 MMHG | HEART RATE: 72 BPM | BODY MASS INDEX: 33.23 KG/M2 | OXYGEN SATURATION: 97 % | RESPIRATION RATE: 20 BRPM | TEMPERATURE: 99 F | WEIGHT: 225 LBS | SYSTOLIC BLOOD PRESSURE: 166 MMHG

## 2025-01-25 DIAGNOSIS — J32.9 SINUSITIS: Primary | ICD-10-CM

## 2025-01-25 LAB
FLUAV AG UPPER RESP QL IA.RAPID: NEGATIVE
FLUBV AG UPPER RESP QL IA.RAPID: NEGATIVE
S PYO DNA THROAT QL NAA+PROBE: NOT DETECTED
SARS-COV+SARS-COV-2 AG RESP QL IA.RAPID: NEGATIVE

## 2025-01-25 PROCEDURE — 87804 INFLUENZA ASSAY W/OPTIC: CPT | Performed by: PHYSICIAN ASSISTANT

## 2025-01-25 PROCEDURE — 99284 EMERGENCY DEPT VISIT MOD MDM: CPT | Performed by: PHYSICIAN ASSISTANT

## 2025-01-25 PROCEDURE — 87651 STREP A DNA AMP PROBE: CPT | Performed by: PHYSICIAN ASSISTANT

## 2025-01-25 PROCEDURE — 87811 SARS-COV-2 COVID19 W/OPTIC: CPT | Performed by: PHYSICIAN ASSISTANT

## 2025-01-25 PROCEDURE — 99283 EMERGENCY DEPT VISIT LOW MDM: CPT

## 2025-01-25 RX ORDER — FLUCONAZOLE 150 MG/1
150 TABLET ORAL ONCE
Qty: 1 TABLET | Refills: 0 | Status: SHIPPED | OUTPATIENT
Start: 2025-01-25 | End: 2025-01-25

## 2025-01-25 RX ADMIN — AMOXICILLIN AND CLAVULANATE POTASSIUM 1 TABLET: 875; 125 TABLET, FILM COATED ORAL at 10:08

## 2025-01-25 NOTE — ED NOTES
Patient requests Diflucan script to be sent to pharmacy - PA made aware and will send script.       Neo Nieto RN  01/25/25 5636

## 2025-01-25 NOTE — DISCHARGE INSTRUCTIONS
Continue flonase inhaler    Augmentin twice daily x 10 days    Follow up with your primary care provider if no improvement next 3 days    Return to ED for new or worsening symptoms

## 2025-01-25 NOTE — ED PROVIDER NOTES
Time reflects when diagnosis was documented in both MDM as applicable and the Disposition within this note       Time User Action Codes Description Comment    1/25/2025  9:42 AM Garth Guzman Add [J32.9] Sinusitis           ED Disposition       ED Disposition   Discharge    Condition   Stable    Date/Time   Sat Jan 25, 2025  9:42 AM    Comment   Roxi Rudolph discharge to home/self care.                   Assessment & Plan       Medical Decision Making  Differential diagnosis includes sinusitis, COVID, flu, strep throat, viral syndrome.  I ordered a nasal swab for COVID and flu. This was negative.  I ordered a strep swab.  This was negative.  Patient is already taking Flonase daily at home.  She was advised to continue this.  Will prescribe 10-day course of Augmentin given clinical signs and symptoms consistent with sinusitis.  Advised follow-up with primary care provider next 3 to 5 days if no improvement.  Return precautions were reviewed.    Pt requested rx diflucan stating she is susceptible to yeast infection when taking antibiotics     Amount and/or Complexity of Data Reviewed  Labs: ordered.    Risk  Prescription drug management.             Medications   amoxicillin-clavulanate (AUGMENTIN) 875-125 mg per tablet 1 tablet (has no administration in time range)       ED Risk Strat Scores                          SBIRT 22yo+      Flowsheet Row Most Recent Value   Initial Alcohol Screen: US AUDIT-C     1. How often do you have a drink containing alcohol? 0 Filed at: 01/25/2025 0833   2. How many drinks containing alcohol do you have on a typical day you are drinking?  0 Filed at: 01/25/2025 0833   3a. Male UNDER 65: How often do you have five or more drinks on one occasion? 0 Filed at: 01/25/2025 0833   3b. FEMALE Any Age, or MALE 65+: How often do you have 4 or more drinks on one occassion? 0 Filed at: 01/25/2025 0833   Audit-C Score 0 Filed at: 01/25/2025 0833                            History of Present  Illness       Chief Complaint   Patient presents with    Sore Throat     Sore throat for a week , right ear pain  and a cough.        Past Medical History:   Diagnosis Date    Allergic     Ankle arthritis     right is having surgery soon for this    Cancer (HCC) 2007    cervical    Disease of thyroid gland     hypo    GERD (gastroesophageal reflux disease)     History of transfusion     age 4    Hyperlipidemia     Hypertension 04/2022    MS (congenital mitral stenosis)     patient has multiple sclerosis     Multiple sclerosis (HCC)       Past Surgical History:   Procedure Laterality Date    CARPAL TUNNEL RELEASE Right     COLONOSCOPY      HYSTERECTOMY      KNEE SURGERY Left 10/05/2023    GA ARTHRP INTERCARPAL/CARP/MTCRPL JT INTERPOSITION Right 10/27/2022    Procedure: ARTHROPLASTY THUMB WEILBY; CARPOMETACARPAL ARTHROPLASTY RIGHT THUMB;  Surgeon: Don Mei MD;  Location: [facility];  Service: Orthopedics    GA ARTHRP INTERCARPAL/CARP/MTCRPL JT INTERPOSITION Left 01/30/2023    Procedure: Thumb Carpometacarpal Arthroplasty;  Surgeon: Don Mei MD;  Location: [facility];  Service: Orthopedics    GA NEUROPLASTY &/TRANSPOS MEDIAN NRV CARPAL TUNNE Right 09/17/2021    Procedure: RELEASE CARPAL TUNNEL;  Surgeon: Garfield Gold DO;  Location: [facility];  Service: Orthopedics    GA OSTEOTOMY CALCANEUS W/WO INTERNAL FIXATION Right 10/20/2021    Procedure: OSTEOTOMY CALCANEUS, Medial slide calcaneal osteotomy, FDL tendon transfer to navicular, Cotton osteotomy, Achilles lengthening, Wilson osteotomy;  Surgeon: James R Lachman, MD;  Location: [facility] MAIN OR;  Service: Orthopedics    SINUS SURGERY      WRIST SURGERY Left 2020      Family History   Problem Relation Age of Onset    No Known Problems Mother       Social History     Tobacco Use    Smoking status: Former     Current packs/day: 0.00     Average packs/day: 0.5 packs/day for 30.0 years (15.0 ttl pk-yrs)     Types: Cigarettes     Start date:  4/10/1990     Quit date: 4/10/2020     Years since quittin.7    Smokeless tobacco: Never   Vaping Use    Vaping status: Never Used   Substance Use Topics    Alcohol use: Yes     Comment: 1-2 drinks once per week    Drug use: Never      E-Cigarette/Vaping    E-Cigarette Use Never User       E-Cigarette/Vaping Substances    Nicotine No     THC No     CBD No     Flavoring No     Other No     Unknown No       I have reviewed and agree with the history as documented.     Patient is a 55 yo wf with history of MS, cervical cancer, HTN, hyperlipidemia who reports >1 week history of sore throat, mild cough, maxillary and frontal sinus pressure with thick green nasal discharge. No documented fever. Reports R ear pain. No sob. No other complaints.         Review of Systems   Constitutional:  Negative for chills and fever.   HENT:  Positive for congestion, postnasal drip, sinus pressure and sore throat.    Respiratory:  Positive for cough. Negative for shortness of breath.    Cardiovascular:  Negative for chest pain.   Gastrointestinal:  Negative for abdominal pain and vomiting.           Objective       ED Triage Vitals [25]   Temperature Pulse Blood Pressure Respirations SpO2 Patient Position - Orthostatic VS   99 °F (37.2 °C) 72 166/94 20 97 % --      Temp src Heart Rate Source BP Location FiO2 (%) Pain Score    -- -- -- -- 6      Vitals      Date and Time Temp Pulse SpO2 Resp BP Pain Score FACES Pain Rating User   25 0831 99 °F (37.2 °C) 72 97 % 20 166/94 6 -- JOANA            Physical Exam  Vitals and nursing note reviewed.   Constitutional:       General: She is not in acute distress.     Appearance: She is well-developed. She is not ill-appearing, toxic-appearing or diaphoretic.   HENT:      Head: Normocephalic and atraumatic.      Right Ear: Tympanic membrane and ear canal normal.      Left Ear: Tympanic membrane and ear canal normal.      Ears:      Comments: Tenderness R maxillary and R frontal  sinus  Pressure increases in both when patient leans forward.      Mouth/Throat:      Mouth: Mucous membranes are dry.      Pharynx: Oropharynx is clear. Uvula midline.      Tonsils: No tonsillar exudate or tonsillar abscesses.   Eyes:      Conjunctiva/sclera: Conjunctivae normal.      Pupils: Pupils are equal, round, and reactive to light.   Cardiovascular:      Rate and Rhythm: Normal rate and regular rhythm.      Heart sounds: Normal heart sounds.   Pulmonary:      Effort: Pulmonary effort is normal.      Breath sounds: Normal breath sounds.   Abdominal:      General: Bowel sounds are normal.      Palpations: Abdomen is soft.      Tenderness: There is no abdominal tenderness.   Musculoskeletal:      Cervical back: Normal range of motion and neck supple.   Skin:     General: Skin is warm and dry.      Capillary Refill: Capillary refill takes less than 2 seconds.   Neurological:      General: No focal deficit present.      Mental Status: She is alert and oriented to person, place, and time.         Results Reviewed       Procedure Component Value Units Date/Time    Strep A PCR [[id number]]  (Normal) Collected: 01/25/25 0848    Lab Status: Final result Specimen: Throat Updated: 01/25/25 0922     STREP A PCR Not Detected    FLU/COVID Rapid Antigen (30 min. TAT) - Preferred screening test in ED [[id number]]  (Normal) Collected: 01/25/25 0848    Lab Status: Final result Specimen: Nares from Nose Updated: 01/25/25 0914     SARS COV Rapid Antigen Negative     Influenza A Rapid Antigen Negative     Influenza B Rapid Antigen Negative    Narrative:      This test has been performed using the [employer] Charline 2 FLU+SARS Antigen test under the Emergency Use Authorization (EUA). This test has been validated by the  and verified by the performing laboratory. The Charline uses lateral flow immunofluorescent sandwich assay to detect SARS-COV, Influenza A and Influenza B Antigen.     The [employer] Charline 2 SARS Antigen test does  not differentiate between SARS-CoV and SARS-CoV-2.     Negative results are presumptive and may be confirmed with a molecular assay, if necessary, for patient management. Negative results do not rule out SARS-CoV-2 or influenza infection and should not be used as the sole basis for treatment or patient management decisions. A negative test result may occur if the level of antigen in a sample is below the limit of detection of this test.     Positive results are indicative of the presence of viral antigens, but do not rule out bacterial infection or co-infection with other viruses.     All test results should be used as an adjunct to clinical observations and other information available to the provider.    FOR PEDIATRIC PATIENTS - copy/paste COVID Guidelines URL to browser: [email or web address]            No orders to display       Procedures    ED Medication and Procedure Management   Prior to Admission Medications   Prescriptions Last Dose Informant Patient Reported? Taking?   Calcium Ascorbate (VITAMIN C) 500 mg tablet   Yes No   Sig: Take 500 mg by mouth daily   Patient not taking: Reported on 10/31/2023   Erythromycin 2 % PADS   No No   Sig: Apply 1 Application topically 2 (two) times a day for 14 days   Patient not taking: Reported on 10/31/2023   Multiple Vitamins-Minerals (multivitamin with minerals) tablet   Yes No   Sig: Take 1 tablet by mouth daily   Teriflunomide (Aubagio) 14 MG TABS   No No   Sig: Take 1 tablet (14 mg total) by mouth in the morning   Patient not taking: Reported on 2023   Teriflunomide (Aubagio) 7 MG TABS   No No   Sig: Take 1 tablet (7 mg total) by mouth in the morning For first 2 weeks.   Patient not taking: Reported on 2023   azelastine (ASTELIN) 0.1 % nasal spray  Self Yes No   Si sprays into each nostril daily   cholecalciferol (VITAMIN D3) 1,000 units tablet   Yes No   Sig: Take 1,000 Units by mouth daily    conjugated estrogens (PREMARIN) 0.9 MG tablet   Yes No   Sig: Take 0.9 mg by mouth daily Take daily for 21 days then do not take for 7 days.   cyclobenzaprine (FLEXERIL) 10 mg tablet  Self Yes No   Sig: Take 10 mg by mouth daily at bedtime    estradiol (ESTRACE) 1 mg tablet  Self Yes No   Sig: Take 2 mg by mouth daily at bedtime     Patient not taking: Reported on 10/31/2023   famotidine (PEPCID) 10 mg tablet  Self Yes No   Sig: Take 10 mg by mouth daily at bedtime   fexofenadine (ALLEGRA) 60 MG tablet  Self Yes No   Sig: Take 60 mg by mouth daily at bedtime    fluticasone (FLONASE) 50 mcg/act nasal spray  Self Yes No   Si mcg into each nostril daily   gabapentin (NEURONTIN) 300 mg capsule   No No   Sig: Take 1 tab in morning and 2 tabs at bedtime.   levothyroxine 50 mcg tablet  Self Yes No   Sig: Take 50 mcg by mouth daily   losartan (COZAAR) 25 mg tablet   Yes No   Sig: Take 25 mg by mouth daily at bedtime   modafinil (PROVIGIL) 100 mg tablet   No No   Sig: TAKE 1 TABLET (100 MG TOTAL) BY MOUTH DAILY   montelukast (SINGULAIR) 10 mg tablet  Self Yes No   Sig: Take 10 mg by mouth daily at bedtime    naproxen (NAPROSYN) 500 mg tablet   Yes No   Sig: Take 500 mg by mouth   oxyCODONE (Roxicodone) 5 immediate release tablet   No No   Sig: Take 1 tablet (5 mg total) by mouth every 4 (four) hours as needed for moderate pain for up to 15 doses Max Daily Amount: 30 mg   Patient not taking: Reported on 2023   rosuvastatin (CRESTOR) 10 MG tablet  Self Yes No   Sig: Take 10 mg by mouth daily at bedtime    Patient not taking: Reported on 10/31/2023   triamcinolone (KENALOG) 0.025 % cream   No No   Sig: Apply topically 2 (two) times a day for 14 days   Patient not taking: Reported on 10/31/2023      Facility-Administered Medications: None     Patient's Medications   Discharge Prescriptions    AMOXICILLIN-CLAVULANATE (AUGMENTIN) 875-125 MG PER TABLET    Take 1 tablet by mouth every 12 (twelve) hours for 10 days        Start Date: 1/25/2025 End Date: 2/4/2025       Order Dose: 1 tablet       Quantity: 20 tablet    Refills: 0     No discharge procedures on file.  ED SEPSIS DOCUMENTATION   Time reflects when diagnosis was documented in both MDM as applicable and the Disposition within this note       Time User Action Codes Description Comment    1/25/2025  9:42 AM Garth Guzman Add [J32.9] Sinusitis                  Garth Guzman PA-C  01/25/25 0945       Garth Guzman PA-C  01/25/25 1015

## (undated) DEVICE — CUFF TOURNIQUET 18 X 4 IN QUICK CONNECT DISP 1 BLADDER

## (undated) DEVICE — PENCIL ELECTROSURG E-Z CLEAN -0035H

## (undated) DEVICE — ALUMI-HAND POSITIONER XLG

## (undated) DEVICE — SUT VICRYL 4-0 PS-2 18 IN J496G

## (undated) DEVICE — BANDAGE, ESMARK LF STR 6"X9' (20/CS): Brand: CYPRESS

## (undated) DEVICE — CHLORAPREP HI-LITE 26ML ORANGE

## (undated) DEVICE — TIBURON HAND DRAPE: Brand: CONVERTORS

## (undated) DEVICE — SPLINT 5 X 30 IN FAST SET PLASTER

## (undated) DEVICE — GLOVE PI ULTRA TOUCH SZ.6.5

## (undated) DEVICE — PACK GENERAL LF

## (undated) DEVICE — GAUZE SPONGES,16 PLY: Brand: CURITY

## (undated) DEVICE — 3M™ DURAPORE™ SURGICAL TAPE 1538-1, 1 INCH X 10 YARD (2,5CM X 9,1M), 12 ROLLS/BOX: Brand: 3M™ DURAPORE™

## (undated) DEVICE — OCCLUSIVE GAUZE STRIP,3% BISMUTH TRIBROMOPHENATE IN PETROLATUM BLEND: Brand: XEROFORM

## (undated) DEVICE — GAUZE SPONGES,USP TYPE VII GAUZE, 12 PLY: Brand: CURITY

## (undated) DEVICE — PADDING CAST 3IN COTTON STRL

## (undated) DEVICE — CURITY NON-ADHERENT STRIPS: Brand: CURITY

## (undated) DEVICE — DRESSING XEROFORM 5 X 9

## (undated) DEVICE — GLOVE INDICATOR PI UNDERGLOVE SZ 8 BLUE

## (undated) DEVICE — CAST PLASTER 4 IN ROLL

## (undated) DEVICE — GLOVE INDICATOR PI UNDERGLOVE SZ 7.5 BLUE

## (undated) DEVICE — SUT VICRYL 2-0 CT-2 18 IN J726D

## (undated) DEVICE — ACE WRAP 4 IN STERILE

## (undated) DEVICE — BETHLEHEM UNIVERSAL  MIONR EXT: Brand: CARDINAL HEALTH

## (undated) DEVICE — ANTIBACTERIAL UNDYED BRAIDED (POLYGLACTIN 910), SYNTHETIC ABSORBABLE SUTURE: Brand: COATED VICRYL

## (undated) DEVICE — SUT ETHILON 4-0 PS-2 18 IN 1667G

## (undated) DEVICE — Device

## (undated) DEVICE — GLOVE SRG BIOGEL 8

## (undated) DEVICE — BANDAGE, ESMARK LF STR 4"X9'(20/CS): Brand: CYPRESS

## (undated) DEVICE — KIT BIO-TENODESIS BIOABS

## (undated) DEVICE — GLOVE INDICATOR PI UNDERGLOVE SZ 6.5 BLUE

## (undated) DEVICE — GLOVE SRG BIOGEL 7.5

## (undated) DEVICE — CUFF TOURNIQUET 30 X 4 IN QUICK CONNECT DISP 1BLA

## (undated) DEVICE — UNDYED BRAIDED (POLYGLACTIN 910), SYNTHETIC ABSORBABLE SUTURE: Brand: COATED VICRYL

## (undated) DEVICE — SPONGE GAUZE 4 X 8 12 PLY STRL LF

## (undated) DEVICE — BASIC DOUBLE BASIN 2-LF: Brand: MEDLINE INDUSTRIES, INC.

## (undated) DEVICE — CAST PADDING 4 IN SYNTHETIC STRL

## (undated) DEVICE — 3M™ STERI-DRAPE™ U-DRAPE 1015: Brand: STERI-DRAPE™

## (undated) DEVICE — SUT VICRYL 0 CT-2 18 IN J727D

## (undated) DEVICE — BRUSH EZ SCRUB PCMX W/NAIL CLEANER

## (undated) DEVICE — SUT VICRYL 4-0 RAPIDE VR496

## (undated) DEVICE — ACE WRAP 3 IN UNSTERILE

## (undated) DEVICE — CAST PADDING 6IN UNSTERILE

## (undated) DEVICE — ACE WRAP 4 IN UNSTERILE

## (undated) DEVICE — DRAPE SHEET THREE QUARTER

## (undated) DEVICE — WEBRIL 6 IN UNSTERILE

## (undated) DEVICE — SATINCRESCENT® KNIFE STRAIGHT: Brand: SATINCRESCENT®

## (undated) DEVICE — STOCKINETTE REGULAR

## (undated) DEVICE — PADDING CAST 4 IN  COTTON STRL

## (undated) DEVICE — INTENDED FOR TISSUE SEPARATION, AND OTHER PROCEDURES THAT REQUIRE A SHARP SURGICAL BLADE TO PUNCTURE OR CUT.: Brand: BARD-PARKER ® CARBON RIB-BACK BLADES

## (undated) DEVICE — ABDOMINAL PAD: Brand: DERMACEA

## (undated) DEVICE — SUT ETHILON 3-0 PS-1 18 IN 1663G

## (undated) DEVICE — INTENDED FOR TISSUE SEPARATION, AND OTHER PROCEDURES THAT REQUIRE A SHARP SURGICAL BLADE TO PUNCTURE OR CUT.: Brand: BARD-PARKER SAFETY BLADES SIZE 15, STERILE

## (undated) DEVICE — ASTOUND STANDARD SURGICAL GOWN, XL: Brand: CONVERTORS

## (undated) DEVICE — LIGHT HANDLE COVER SLEEVE DISP BLUE STELLAR

## (undated) DEVICE — GUIDEWIRE 2.4MM X 9.25IN

## (undated) DEVICE — FABRIC REINFORCED, SURGICAL GOWN, XL: Brand: CONVERTORS

## (undated) DEVICE — SPONGE SCRUB 4 PCT CHLORHEXIDINE

## (undated) DEVICE — MEDI-VAC YANKAUER SUCTION HANDLE W/STRAIGHT TIP & CONTROL VENT: Brand: CARDINAL HEALTH

## (undated) DEVICE — DRAPE KIT C-ARM W/PLATE PRTC FOOT SWITCH COVER

## (undated) DEVICE — ACE WRAP 2 IN VELCRO LATEX FREE

## (undated) DEVICE — ACE WRAP 6 IN UNSTERILE

## (undated) DEVICE — PRECISION (7.0 X 0.51 X 15.0MM)

## (undated) DEVICE — ACE WRAP 2 IN UNSTERILE

## (undated) DEVICE — DISPOSABLE EQUIPMENT COVER: Brand: SMALL TOWEL DRAPE

## (undated) DEVICE — COBAN 4 IN STERILE